# Patient Record
Sex: MALE | Race: WHITE | NOT HISPANIC OR LATINO | Employment: OTHER | URBAN - NONMETROPOLITAN AREA
[De-identification: names, ages, dates, MRNs, and addresses within clinical notes are randomized per-mention and may not be internally consistent; named-entity substitution may affect disease eponyms.]

---

## 2017-02-01 RX ORDER — CLONIDINE HYDROCHLORIDE 0.2 MG/1
TABLET ORAL
Qty: 90 TABLET | Refills: 1 | Status: SHIPPED | OUTPATIENT
Start: 2017-02-01 | End: 2017-02-22 | Stop reason: SDUPTHER

## 2017-02-10 ENCOUNTER — DOCUMENTATION (OUTPATIENT)
Dept: CARDIOLOGY | Facility: CLINIC | Age: 52
End: 2017-02-10

## 2017-02-10 DIAGNOSIS — I10 ESSENTIAL HYPERTENSION: Primary | ICD-10-CM

## 2017-02-10 RX ORDER — LOSARTAN POTASSIUM 100 MG/1
50 TABLET ORAL 2 TIMES DAILY
Qty: 30 TABLET | Refills: 0 | Status: SHIPPED | OUTPATIENT
Start: 2017-02-10 | End: 2017-02-22 | Stop reason: SDUPTHER

## 2017-02-22 ENCOUNTER — OFFICE VISIT (OUTPATIENT)
Dept: CARDIOLOGY | Facility: CLINIC | Age: 52
End: 2017-02-22

## 2017-02-22 VITALS
HEART RATE: 78 BPM | DIASTOLIC BLOOD PRESSURE: 85 MMHG | HEIGHT: 73 IN | RESPIRATION RATE: 16 BRPM | BODY MASS INDEX: 33.08 KG/M2 | SYSTOLIC BLOOD PRESSURE: 132 MMHG | WEIGHT: 249.6 LBS

## 2017-02-22 DIAGNOSIS — E66.9 OBESITY (BMI 30-39.9): ICD-10-CM

## 2017-02-22 DIAGNOSIS — I25.10 CORONARY ARTERY DISEASE INVOLVING NATIVE CORONARY ARTERY OF NATIVE HEART WITHOUT ANGINA PECTORIS: Primary | ICD-10-CM

## 2017-02-22 DIAGNOSIS — Z72.0 TOBACCO ABUSE: ICD-10-CM

## 2017-02-22 DIAGNOSIS — I10 ESSENTIAL HYPERTENSION: ICD-10-CM

## 2017-02-22 DIAGNOSIS — E78.5 DYSLIPIDEMIA: ICD-10-CM

## 2017-02-22 PROCEDURE — 99213 OFFICE O/P EST LOW 20 MIN: CPT | Performed by: INTERNAL MEDICINE

## 2017-02-22 RX ORDER — CLONIDINE HYDROCHLORIDE 0.2 MG/1
0.2 TABLET ORAL 2 TIMES DAILY
Qty: 60 TABLET | Refills: 3 | Status: SHIPPED | OUTPATIENT
Start: 2017-02-22 | End: 2017-08-15 | Stop reason: SDUPTHER

## 2017-02-22 RX ORDER — CLONIDINE HYDROCHLORIDE 0.2 MG/1
TABLET ORAL
Qty: 90 TABLET | Refills: 1 | Status: CANCELLED | OUTPATIENT
Start: 2017-02-22

## 2017-02-22 RX ORDER — LOSARTAN POTASSIUM 100 MG/1
100 TABLET ORAL DAILY
Qty: 30 TABLET | Refills: 2 | Status: SHIPPED | OUTPATIENT
Start: 2017-02-22 | End: 2017-07-11 | Stop reason: SDUPTHER

## 2017-02-22 RX ORDER — LOSARTAN POTASSIUM 100 MG/1
50 TABLET ORAL 2 TIMES DAILY
Qty: 30 TABLET | Refills: 0 | Status: CANCELLED | OUTPATIENT
Start: 2017-02-22

## 2017-02-22 NOTE — PROGRESS NOTES
Carlos Carson  1965 02/22/2017   Ref:No referring provider defined for this encounter.  Pcp: Marta Davis, [place]  [address]  [facility] [address]    Follow up for:  Chief Complaint   Patient presents with   • Hypertension     Bp equiptment at home is broken.   • Coronary Artery Disease   • PVD   • Dyslipidemia        Patient Active Problem List   Diagnosis   • Essential hypertension   • Coronary artery disease without angina pectoris, non-hemodynamically significant   • PVD (peripheral vascular disease)   • Dyslipidemia   • Tobacco abuse   • Simple chronic bronchitis   • Obesity (BMI 30-39.9)   • Cough due to ACE inhibitor (Lisinopril)   • Chronic bronchitis       HPI     Carlos Carson is a 52 yo male who presents to our office today for a follow-up of hypertension. Patient denies chest pain, shortness of breath, edema, palpitations, dizziness, and no syncope. He does note some dry mouth but tolerates it .    Review of Systems   Constitution: Negative for chills and fever.   HENT: Negative for headaches, nosebleeds and sore throat.    Respiratory: Negative for cough, hemoptysis and wheezing.    Gastrointestinal: Negative for abdominal pain, hematemesis, hematochezia, melena, nausea and vomiting.   Genitourinary: Negative for dysuria and hematuria.         Current Outpatient Prescriptions:   •  amLODIPine (NORVASC) 10 MG tablet, Take 1 tablet by mouth Every Morning., Disp: 30 tablet, Rfl: 3  •  CloNIDine (CATAPRES) 0.2 MG tablet, Take 1 tablet by mouth 2 (Two) Times a Day., Disp: 60 tablet, Rfl: 3  •  gabapentin (NEURONTIN) 800 MG tablet, Take 800 mg by mouth 4 (Four) Times a Day., Disp: , Rfl: 0  •  HYDROcodone-acetaminophen (NORCO) 7.5-325 MG per tablet, Take 7.5 tablets by mouth 2 (Two) Times a Day., Disp: , Rfl: 0  •  ibuprofen (ADVIL,MOTRIN) 800 MG tablet, Take 800 mg by mouth 3 (Three) Times a Day., Disp: , Rfl: 2  •  losartan (COZAAR) 100 MG tablet, Take 1 tablet by mouth Daily.,  "Disp: 30 tablet, Rfl: 2    Allergies   Allergen Reactions   • Lisinopril Cough       Visit Vitals   • /85 (BP Location: Right arm, Patient Position: Sitting, Cuff Size: Adult)   • Pulse 78   • Resp 16   • Ht 73\" (185.4 cm)   • Wt 249 lb 9.6 oz (113 kg)   • BMI 32.93 kg/m2          Physical Exam   Constitutional: He is oriented to person, place, and time. He appears well-developed and well-nourished. No distress.   Moderate obese   Neck: No JVD present. No tracheal deviation present.   Cardiovascular: Normal rate, regular rhythm, normal heart sounds and intact distal pulses.  Exam reveals no gallop and no friction rub.    No murmur heard.  Pulses:       Carotid pulses are 2+ on the right side, and 2+ on the left side.       Dorsalis pedis pulses are 0 on the right side, and 0 on the left side.        Posterior tibial pulses are 0 on the right side, and 0 on the left side.   Pulmonary/Chest: Breath sounds normal. No respiratory distress. He has no wheezes. He has no rales. He exhibits no tenderness.   Abdominal: Soft. Bowel sounds are normal. He exhibits no distension and no mass. There is no tenderness. There is no rebound and no guarding.   Moderately obese    Musculoskeletal: He exhibits no edema or tenderness.   Lymphadenopathy:     He has no cervical adenopathy.   Neurological: He is alert and oriented to person, place, and time. He has normal reflexes. No cranial nerve deficit. He exhibits normal muscle tone. Coordination normal.   Skin: Skin is warm and dry. No rash noted. He is not diaphoretic. No erythema. No pallor.   Psychiatric: He has a normal mood and affect. His behavior is normal. Judgment and thought content normal.   :    Lab Review:    Procedures    No results found for: NA, K, CL, CO2, BUN, CREATININE, LABGLOM, GLUCOSE, CALCIUM, AST, ALT, ALKPHOS, LABIL2  No results found for: CKTOTAL  No results found for: WBC, HGB, HCT, PLT  No results found for: INR  No results found for: MG  No results " found for: TSH, PSA, CHLPL, TRIG, HDL, LDL   No results found for: BNP    Chemistry    No results found for: NA, K, CL, CO2, BUN, CREATININE, GLU No results found for: CALCIUM, ALKPHOS, AST, ALT, BILITOT         Impression:   Diagnosis Plan   1. Coronary artery disease involving native coronary artery of native heart without angina pectoris     2. Essential hypertension, better control     3. Dyslipidemia     4. Tobacco abuse     5. Obesity (BMI 30-39.9)         Plan:    1. Continue same medication.   2. Check home BP and record.  No orders of the defined types were placed in this encounter.        Return in about 6 weeks (around 4/5/2017) for Recheck, Or sooner if needed.     This document signed by Dez Pang MD February 22, 2017 1:22 PM

## 2017-02-22 NOTE — PROGRESS NOTES
Carlos Carson  1965 12/23/2016   Pcp: Marta Davis, [place]  [address]  [id number]    Follow up for:  Chief Complaint   Patient presents with   • Hypertension     Bp equiptment at home is broken.   • Coronary Artery Disease   • PVD   • Dyslipidemia        Patient Active Problem List   Diagnosis   • Essential hypertension   • Coronary artery disease without angina pectoris, non-hemodynamically significant   • PVD (peripheral vascular disease)   • Dyslipidemia   • Tobacco abuse   • Simple chronic bronchitis   • Obesity (BMI 30-39.9)   • Cough due to ACE inhibitor (Lisinopril)   • Chronic bronchitis       HPI   Mr. Carson is a 52 yo male who has a history of CAD, hypertension, dyslipidemia and tobacco abuse.  He notes increased fatigue of uncertain etiology for the last 3 weeks..  Blood pressure log from home was brought to office and the readings at home tend to run higher than reading today in office (135-165/)..  He denies chest pain/pressure, palpitations, dizziness or syncope episodes.  He notes leg cramps (calves) with walking after 10 minutes.     Sodium intake has decreased. His caffeine intake is stated to be two pots (12 cups) per day of coffee.  He still smokes 1.5 ppd.      Review of Systems   Constitution: Negative for fever.   HENT: Positive for headaches.    Cardiovascular: Negative for chest pain, leg swelling, near-syncope, palpitations and syncope.   Respiratory: Positive for cough. Negative for shortness of breath.    Gastrointestinal: Negative for abdominal pain.   Neurological: Negative for dizziness and light-headedness.         Current Outpatient Prescriptions:   •  amLODIPine (NORVASC) 10 MG tablet, Take 1 tablet by mouth Every Morning., Disp: 30 tablet, Rfl: 3  •  CloNIDine (CATAPRES) 0.2 MG tablet, TAKE ONE TABLET BY MOUTH THREE TIMES A DAY FOR BLOOD PRESSURE, Disp: 90 tablet, Rfl: 1  •  gabapentin (NEURONTIN) 800 MG tablet, Take 800 mg by mouth 4 (Four)  "Times a Day., Disp: , Rfl: 0  •  HYDROcodone-acetaminophen (NORCO) 7.5-325 MG per tablet, Take 7.5 tablets by mouth 2 (Two) Times a Day., Disp: , Rfl: 0  •  ibuprofen (ADVIL,MOTRIN) 800 MG tablet, Take 800 mg by mouth 3 (Three) Times a Day., Disp: , Rfl: 2  •  losartan (COZAAR) 100 MG tablet, Take 0.5 tablets by mouth 2 (Two) Times a Day., Disp: 30 tablet, Rfl: 0    Allergies   Allergen Reactions   • Lisinopril Cough       Visit Vitals   • /85 (BP Location: Right arm, Patient Position: Sitting, Cuff Size: Adult)   • Pulse 78   • Resp 16   • Ht 73\" (185.4 cm)   • Wt 249 lb 9.6 oz (113 kg)   • BMI 32.93 kg/m2          Physical Exam   Constitutional: He is oriented to person, place, and time. He appears well-developed and well-nourished. No distress.   Neck: No JVD present. No tracheal deviation present.   Cardiovascular: Normal rate, regular rhythm and normal heart sounds.  Exam reveals no gallop, no S3, no S4 and no friction rub.    No murmur heard.  Pulses:       Carotid pulses are 2+ on the right side, and 2+ on the left side.       Dorsalis pedis pulses are 0 on the right side, and 0 on the left side.        Posterior tibial pulses are 0 on the right side, and 0 on the left side.   Pulmonary/Chest: Effort normal. No respiratory distress. He has decreased breath sounds. He has no wheezes. He has no rales. He exhibits no tenderness.   Abdominal: Soft. Bowel sounds are normal. He exhibits no distension and no mass. There is no tenderness.   Musculoskeletal: He exhibits no edema.   Neurological: He is alert and oriented to person, place, and time. No cranial nerve deficit.   Skin: Skin is warm and dry. He is not diaphoretic.   :      Procedures      Impression:  No diagnosis found.    Plan:    1.  Decrease Clonidine to .5 tablet of  0.2 mg, po qam. Continue to take 0.2 mg po qpm.  2.  Increase Amlodipine to 10 mg po qam.  Rx sent to pharmacy.   3.  Encouraged decreasing caffeine intake.  4.  Bring home BP cuff " to next visit to compare with our office equipment.  5.  Encouraged smoking cessation.  6.  Later stress test in form of Dobutrex stress echo and JOHN PAUL      No Follow-up on file.     This document signed by Giuliana Nugent CMA February 22, 2017 11:17 AM       Scribed for Dez Pang MD by Giuliana Nugent CMA 2/22/2017  11:17 AM

## 2017-04-03 ENCOUNTER — OFFICE VISIT (OUTPATIENT)
Dept: CARDIOLOGY | Facility: CLINIC | Age: 52
End: 2017-04-03

## 2017-04-03 VITALS
SYSTOLIC BLOOD PRESSURE: 131 MMHG | BODY MASS INDEX: 33.27 KG/M2 | DIASTOLIC BLOOD PRESSURE: 92 MMHG | RESPIRATION RATE: 16 BRPM | WEIGHT: 251 LBS | HEART RATE: 81 BPM | HEIGHT: 73 IN

## 2017-04-03 DIAGNOSIS — Z72.0 TOBACCO ABUSE: ICD-10-CM

## 2017-04-03 DIAGNOSIS — R05.8 COUGH DUE TO ACE INHIBITOR: ICD-10-CM

## 2017-04-03 DIAGNOSIS — E78.5 DYSLIPIDEMIA: ICD-10-CM

## 2017-04-03 DIAGNOSIS — I10 ESSENTIAL HYPERTENSION: ICD-10-CM

## 2017-04-03 DIAGNOSIS — I25.10 CORONARY ARTERY DISEASE INVOLVING NATIVE CORONARY ARTERY OF NATIVE HEART WITHOUT ANGINA PECTORIS: Primary | ICD-10-CM

## 2017-04-03 DIAGNOSIS — T46.4X5A COUGH DUE TO ACE INHIBITOR: ICD-10-CM

## 2017-04-03 DIAGNOSIS — E66.9 OBESITY (BMI 30-39.9): ICD-10-CM

## 2017-04-03 PROCEDURE — 99213 OFFICE O/P EST LOW 20 MIN: CPT | Performed by: INTERNAL MEDICINE

## 2017-04-03 NOTE — PROGRESS NOTES
Carlos Carson  1965 04/03/2017   Ref:No referring provider defined for this encounter.  [facility] Davis, [place]  [address]  [facility] [address]    Follow up for:  Chief Complaint   Patient presents with   • Coronary Artery Disease     Follow up   • Hypertension        Patient Active Problem List   Diagnosis   • Essential hypertension   • Coronary artery disease without angina pectoris, non-hemodynamically significant   • PVD (peripheral vascular disease)   • Dyslipidemia   • Tobacco abuse   • Simple chronic bronchitis   • Obesity (BMI 30-39.9)   • Cough due to ACE inhibitor (Lisinopril)   • Chronic bronchitis       HPI     Carlos Carson is a 50 yo male who presents to our office today for a follow-up of  hypertension. Patient notes blood pressure at home is running 120's-130's, but over the last week it has been running in higher in the 150's/100. He has been using a new spice. Patient denies chest pain, shortness of breath, edema palpitations, dizziness, and no syncope. Patient notes smokes 1 1/2-2 ppd.     Review of Systems   Constitution: Positive for malaise/fatigue. Negative for chills and fever.   HENT: Negative for ear pain, headaches and nosebleeds.    Cardiovascular: Positive for dyspnea on exertion. Negative for irregular heartbeat, leg swelling and palpitations.   Respiratory: Positive for wheezing. Negative for cough and shortness of breath.    Hematologic/Lymphatic: Does not bruise/bleed easily.   Skin: Negative for rash.   Gastrointestinal: Negative for abdominal pain, constipation and diarrhea.   Neurological: Positive for light-headedness. Negative for dizziness.   Psychiatric/Behavioral: Negative for depression. The patient is not nervous/anxious.          Current Outpatient Prescriptions:   •  amLODIPine (NORVASC) 10 MG tablet, Take 1 tablet by mouth Every Morning., Disp: 30 tablet, Rfl: 3  •  CloNIDine (CATAPRES) 0.2 MG tablet, Take 1 tablet by mouth 2 (Two) Times a  "Day., Disp: 60 tablet, Rfl: 3  •  gabapentin (NEURONTIN) 800 MG tablet, Take 800 mg by mouth 4 (Four) Times a Day., Disp: , Rfl: 0  •  HYDROcodone-acetaminophen (NORCO) 7.5-325 MG per tablet, Take 7.5 tablets by mouth 2 (Two) Times a Day., Disp: , Rfl: 0  •  ibuprofen (ADVIL,MOTRIN) 800 MG tablet, Take 800 mg by mouth 3 (Three) Times a Day., Disp: , Rfl: 2  •  losartan (COZAAR) 100 MG tablet, Take 1 tablet by mouth Daily., Disp: 30 tablet, Rfl: 2    Allergies   Allergen Reactions   • Lisinopril Cough       /92 (BP Location: Right arm, Patient Position: Sitting, Cuff Size: Adult)  Pulse 81  Resp 16  Ht 73\" (185.4 cm)  Wt 251 lb (114 kg)  BMI 33.12 kg/m2       Physical Exam   Constitutional: He is oriented to person, place, and time. He appears well-developed and well-nourished. No distress.   Moderately obese   Neck: No JVD present. No tracheal deviation present.   Cardiovascular: Normal rate, regular rhythm, normal heart sounds and intact distal pulses.  Exam reveals no gallop and no friction rub.    No murmur heard.  Pulses:       Carotid pulses are 2+ on the right side, and 2+ on the left side.       Dorsalis pedis pulses are 2+ on the right side, and 2+ on the left side.        Posterior tibial pulses are 2+ on the right side, and 2+ on the left side.   Pulmonary/Chest: Effort normal and breath sounds normal. No respiratory distress. He has no wheezes. He has no rales. He exhibits no tenderness.   Abdominal: Soft. Bowel sounds are normal. He exhibits no distension and no mass. There is no tenderness. There is no rebound and no guarding.   Moderately obese   Musculoskeletal: He exhibits no edema or tenderness.   Neurological: He is alert and oriented to person, place, and time. He has normal reflexes. He displays normal reflexes. No cranial nerve deficit. He exhibits normal muscle tone. Coordination normal.   Skin: Skin is warm and dry. No rash noted. He is not diaphoretic. No erythema. No pallor. "   Psychiatric: He has a normal mood and affect. His behavior is normal. Judgment and thought content normal.   :    Lab Review:    Procedures    No results found for: NA, K, CL, CO2, BUN, CREATININE, LABGLOM, GLUCOSE, CALCIUM, AST, ALT, ALKPHOS, LABIL2  No results found for: CKTOTAL  No results found for: WBC, HGB, HCT, PLT  No results found for: INR  No results found for: MG  No results found for: TSH, PSA, CHLPL, TRIG, HDL, LDL   No results found for: BNP    Chemistry    No results found for: NA, K, CL, CO2, BUN, CREATININE, GLU No results found for: CALCIUM, ALKPHOS, AST, ALT, BILITOT         Impression:   Diagnosis Plan   1. Coronary artery disease involving native coronary artery of native heart without angina pectoris     2. Essential hypertension     3. Cough due to ACE inhibitor (Lisinopril)     4. Dyslipidemia     5. Obesity (BMI 30-39.9)     6. Tobacco abuse         Plan:  No orders of the defined types were placed in this encounter.  1. Request labs from PCP  2. Continue to check BP at home and keep a log      Return in about 6 months (around 10/3/2017) for Recheck, Or sooner if needed.     This document signed by Nini Aviles MA April 3, 2017 8:57 AM     Scribed for Dez Pang MD by Nini Aviles CMA. 4/3/2017  8:57 AM     I, Dez Pang MD, personally performed the services described in this documentation as scribed by the above named individual in my presence, and it is both accurate and complete.  4/3/2017  10:54 AM

## 2017-04-04 DIAGNOSIS — Z79.899 DRUG THERAPY: Primary | ICD-10-CM

## 2017-04-04 RX ORDER — SIMVASTATIN 20 MG
20 TABLET ORAL NIGHTLY
Qty: 30 TABLET | Refills: 3 | Status: SHIPPED | OUTPATIENT
Start: 2017-04-04 | End: 2017-08-15 | Stop reason: SDUPTHER

## 2017-04-14 ENCOUNTER — TELEPHONE (OUTPATIENT)
Dept: CARDIOLOGY | Facility: CLINIC | Age: 52
End: 2017-04-14

## 2017-05-12 ENCOUNTER — TELEPHONE (OUTPATIENT)
Dept: CARDIOLOGY | Facility: CLINIC | Age: 52
End: 2017-05-12

## 2017-05-12 RX ORDER — NICOTINE 21 MG/24HR
1 PATCH, TRANSDERMAL 24 HOURS TRANSDERMAL EVERY 24 HOURS
Qty: 42 PATCH | Refills: 0 | Status: SHIPPED | OUTPATIENT
Start: 2017-05-12 | End: 2018-02-26 | Stop reason: ALTCHOICE

## 2017-05-12 RX ORDER — NICOTINE 21 MG/24HR
1 PATCH, TRANSDERMAL 24 HOURS TRANSDERMAL EVERY 24 HOURS
Qty: 14 PATCH | Refills: 0 | Status: SHIPPED | OUTPATIENT
Start: 2017-05-12 | End: 2018-02-26 | Stop reason: ALTCHOICE

## 2017-05-18 DIAGNOSIS — I10 ESSENTIAL HYPERTENSION: ICD-10-CM

## 2017-05-18 RX ORDER — AMLODIPINE BESYLATE 10 MG/1
10 TABLET ORAL EVERY MORNING
Qty: 30 TABLET | Refills: 3 | Status: SHIPPED | OUTPATIENT
Start: 2017-05-18 | End: 2017-08-15 | Stop reason: SDUPTHER

## 2017-07-11 RX ORDER — LOSARTAN POTASSIUM 100 MG/1
100 TABLET ORAL DAILY
Qty: 30 TABLET | Refills: 2 | Status: SHIPPED | OUTPATIENT
Start: 2017-07-11 | End: 2017-08-15 | Stop reason: SDUPTHER

## 2017-08-15 ENCOUNTER — OFFICE VISIT (OUTPATIENT)
Dept: CARDIOLOGY | Facility: CLINIC | Age: 52
End: 2017-08-15

## 2017-08-15 VITALS
WEIGHT: 260 LBS | HEIGHT: 73 IN | DIASTOLIC BLOOD PRESSURE: 86 MMHG | HEART RATE: 91 BPM | SYSTOLIC BLOOD PRESSURE: 140 MMHG | BODY MASS INDEX: 34.46 KG/M2 | RESPIRATION RATE: 16 BRPM

## 2017-08-15 DIAGNOSIS — E78.5 DYSLIPIDEMIA: ICD-10-CM

## 2017-08-15 DIAGNOSIS — I25.10 CORONARY ARTERY DISEASE INVOLVING NATIVE CORONARY ARTERY OF NATIVE HEART WITHOUT ANGINA PECTORIS: Primary | ICD-10-CM

## 2017-08-15 DIAGNOSIS — J41.0 SIMPLE CHRONIC BRONCHITIS (HCC): ICD-10-CM

## 2017-08-15 DIAGNOSIS — E66.9 OBESITY (BMI 30-39.9): ICD-10-CM

## 2017-08-15 DIAGNOSIS — I10 ESSENTIAL HYPERTENSION: ICD-10-CM

## 2017-08-15 DIAGNOSIS — Z72.0 TOBACCO ABUSE: ICD-10-CM

## 2017-08-15 DIAGNOSIS — I73.9 PVD (PERIPHERAL VASCULAR DISEASE) (HCC): ICD-10-CM

## 2017-08-15 PROCEDURE — 99214 OFFICE O/P EST MOD 30 MIN: CPT | Performed by: INTERNAL MEDICINE

## 2017-08-15 PROCEDURE — 93000 ELECTROCARDIOGRAM COMPLETE: CPT | Performed by: INTERNAL MEDICINE

## 2017-08-15 RX ORDER — CLONIDINE HYDROCHLORIDE 0.2 MG/1
0.2 TABLET ORAL 2 TIMES DAILY
Qty: 180 TABLET | Refills: 1 | Status: SHIPPED | OUTPATIENT
Start: 2017-08-15 | End: 2017-11-27 | Stop reason: SDUPTHER

## 2017-08-15 RX ORDER — AMLODIPINE BESYLATE 10 MG/1
10 TABLET ORAL EVERY MORNING
Qty: 90 TABLET | Refills: 1 | Status: SHIPPED | OUTPATIENT
Start: 2017-08-15 | End: 2017-08-24 | Stop reason: SDUPTHER

## 2017-08-15 RX ORDER — SIMVASTATIN 20 MG
20 TABLET ORAL NIGHTLY
Qty: 90 TABLET | Refills: 1 | Status: SHIPPED | OUTPATIENT
Start: 2017-08-15 | End: 2017-12-27 | Stop reason: SDUPTHER

## 2017-08-15 RX ORDER — LOSARTAN POTASSIUM 100 MG/1
100 TABLET ORAL DAILY
Qty: 90 TABLET | Refills: 1 | Status: SHIPPED | OUTPATIENT
Start: 2017-08-15 | End: 2017-11-27 | Stop reason: SDUPTHER

## 2017-08-15 NOTE — PROGRESS NOTES
Carlos Carson  1965  08/15/2017   Ref:No referring provider defined for this encounter.  Pcp: Marta Davis, APRN  [address]  [place] [address]    Follow up for:  Chief Complaint   Patient presents with   • Follow-up   • Meds     List        Patient Active Problem List   Diagnosis   • Essential hypertension   • Coronary artery disease without angina pectoris, non-hemodynamically significant   • PVD (peripheral vascular disease)   • Dyslipidemia   • Tobacco abuse   • Simple chronic bronchitis   • Obesity (BMI 30-39.9)   • Cough due to ACE inhibitor (Lisinopril)   • Chronic bronchitis       HPI     Carlos Carson is a 51 yo male who presents to our office today for a follow-up visit of hypertension. Average blood pressure in July and August 125/85. Only about 20-25% of Bps were mildly elevated in the 140-150 range. Patient notes occasional dizziness that lasts 5-30 minutes and happens 2-3 times a week. Patient denies chest pain, shortness of breath, edema, palpitations and syncope. Tobacco use 1-1/2 to 2 ppd.     Review of Systems   Constitution: Negative for chills and fever.   HENT: Negative for headaches, nosebleeds and sore throat.    Cardiovascular: Negative for chest pain, leg swelling, palpitations and syncope.   Respiratory: Negative for cough, hemoptysis, shortness of breath and wheezing.    Gastrointestinal: Negative for abdominal pain, hematemesis, hematochezia, melena, nausea and vomiting.   Genitourinary: Negative for dysuria and hematuria.   Neurological: Positive for dizziness.         Current Outpatient Prescriptions:   •  amLODIPine (NORVASC) 10 MG tablet, Take 1 tablet by mouth Every Morning., Disp: 90 tablet, Rfl: 1  •  CloNIDine (CATAPRES) 0.2 MG tablet, Take 1 tablet by mouth 2 (Two) Times a Day., Disp: 180 tablet, Rfl: 1  •  gabapentin (NEURONTIN) 800 MG tablet, Take 800 mg by mouth 4 (Four) Times a Day., Disp: , Rfl: 0  •  HYDROcodone-acetaminophen (NORCO) 7.5-325 MG  "per tablet, Take 7.5 tablets by mouth 2 (Two) Times a Day., Disp: , Rfl: 0  •  ibuprofen (ADVIL,MOTRIN) 800 MG tablet, Take 800 mg by mouth 3 (Three) Times a Day., Disp: , Rfl: 2  •  losartan (COZAAR) 100 MG tablet, Take 1 tablet by mouth Daily., Disp: 90 tablet, Rfl: 1  •  nicotine (NICODERM CQ) 14 MG/24HR patch, Place 1 patch on the skin Daily. After finished the 21mg patches. Apply 14mg patch QD X2 weeks., Disp: 14 patch, Rfl: 0  •  nicotine (NICODERM CQ) 21 MG/24HR patch, Place 1 patch on the skin Daily. 21mg  QD X 6 weeks., Disp: 42 patch, Rfl: 0  •  nicotine (NICODERM CQ) 7 MG/24HR patch, Place 1 patch on the skin Daily. After finished the 14mg patches. Apply 7mg patch qd x 2 weeks., Disp: 14 patch, Rfl: 0  •  simvastatin (ZOCOR) 20 MG tablet, Take 1 tablet by mouth Every Night., Disp: 90 tablet, Rfl: 1    Allergies   Allergen Reactions   • Lisinopril Cough       /86 (BP Location: Right arm)  Pulse 91  Resp 16  Ht 73\" (185.4 cm)  Wt 260 lb (118 kg)  BMI 34.3 kg/m2       Physical Exam   Constitutional: He is oriented to person, place, and time. He appears well-developed and well-nourished. No distress.   Moderately obese   Eyes: No scleral icterus.   Neck: No JVD present. No tracheal deviation present. No thyromegaly present.   Cardiovascular: Normal rate, regular rhythm and intact distal pulses.  Exam reveals no gallop and no friction rub.    No murmur heard.  Pulses:       Carotid pulses are 2+ on the right side, and 2+ on the left side.       Dorsalis pedis pulses are 0 on the right side, and 0 on the left side.        Posterior tibial pulses are 0 on the right side, and 0 on the left side.   Markedly distant heart sounds   Pulmonary/Chest: Effort normal. No stridor. No respiratory distress. He has no wheezes. He has no rales. He exhibits no tenderness.   Markedly decreased breath sounds   Abdominal: Soft. Bowel sounds are normal. He exhibits no distension and no mass. There is no tenderness. " There is no rebound and no guarding.   Moderately obese   Musculoskeletal: Normal range of motion. He exhibits edema (1+ edema). He exhibits no tenderness (trace ankle edema bilaterally) or deformity.   Lymphadenopathy:     He has no cervical adenopathy.   Neurological: He is alert and oriented to person, place, and time. No cranial nerve deficit. He exhibits normal muscle tone. Coordination normal.   Skin: No rash noted. He is not diaphoretic. No erythema. No pallor.   Psychiatric: He has a normal mood and affect. His behavior is normal. Judgment and thought content normal.   :    Lab Review:      ECG 12 Lead  Date/Time: 8/15/2017 8:05 AM  Performed by: ZENA RAI  Authorized by: ZENA RAI   Comparison: compared with previous ECG from 11/9/2016  Similar to previous ECG  Rhythm: sinus rhythm  Rate: normal  BPM: 83  QRS axis: normal  Other findings: early repolarization  Clinical impression: abnormal ECG  Comments: QTc: 38   Short WI interval =.11            No results found for: NA, K, CL, CO2, BUN, CREATININE, LABGLOM, GLUCOSE, CALCIUM, AST, ALT, ALKPHOS, LABIL2  No results found for: CKTOTAL  No results found for: WBC, HGB, HCT, PLT  No results found for: INR  No results found for: MG  No results found for: TSH, PSA, CHLPL, TRIG, HDL, LDL   No results found for: BNP    Chemistry    No results found for: NA, K, CL, CO2, BUN, CREATININE, GLU No results found for: CALCIUM, ALKPHOS, AST, ALT, BILITOT         Impression:   Diagnosis Plan   1. Coronary artery disease involving native coronary artery of native heart without angina pectoris  ECG 12 Lead   2. Essential hypertension, uncontrolled  amLODIPine (NORVASC) 10 MG tablet   3. PVD (peripheral vascular disease)     4. Dyslipidemia     5. Tobacco abuse     6. Simple chronic bronchitis     7. Obesity (BMI 30-39.9)         Plan: Continue amlodipine, losartan clonidine. Can use extra dose of clonidine if BP high.  Weight loss.   D/C smoking  discussed.  Gets labs from [facility].    Return in about 6 months (around 2/15/2018) for recheck.     This document signed by Dez Pang MD August 15, 2017 10:37 AM     I, Dez Pang MD, personally performed the services described in this documentation as scribed by the above named individual in my presence, and it is both accurate and complete.  8/15/2017  10:37 AM    Scribed for Dez Pang MD by Nini Aviles CMA. 8/15/2017  10:37 AM

## 2017-08-16 ENCOUNTER — TELEPHONE (OUTPATIENT)
Dept: CARDIOLOGY | Facility: CLINIC | Age: 52
End: 2017-08-16

## 2017-08-16 NOTE — TELEPHONE ENCOUNTER
----- Message from Dez Pang MD sent at 8/15/2017  8:48 AM EDT -----  Get labs from 3 months [facility].     Called Severiano VASQUEZ and requested his labs.

## 2017-08-24 DIAGNOSIS — I10 ESSENTIAL HYPERTENSION: ICD-10-CM

## 2017-08-24 RX ORDER — AMLODIPINE BESYLATE 10 MG/1
10 TABLET ORAL EVERY MORNING
Qty: 90 TABLET | Refills: 1 | Status: SHIPPED | OUTPATIENT
Start: 2017-08-24 | End: 2017-09-26 | Stop reason: SDUPTHER

## 2017-09-26 ENCOUNTER — TELEPHONE (OUTPATIENT)
Dept: CARDIOLOGY | Facility: CLINIC | Age: 52
End: 2017-09-26

## 2017-09-26 DIAGNOSIS — I10 ESSENTIAL HYPERTENSION: ICD-10-CM

## 2017-09-26 RX ORDER — AMLODIPINE BESYLATE 10 MG/1
10 TABLET ORAL EVERY MORNING
Qty: 90 TABLET | Refills: 2 | Status: SHIPPED | OUTPATIENT
Start: 2017-09-26 | End: 2018-12-07 | Stop reason: SDUPTHER

## 2017-09-26 NOTE — TELEPHONE ENCOUNTER
Patient called needing a refill on his amlodipine 10mg. Can he get enough refills until he comes back in in Feb?   [facility] pharmacy.

## 2017-11-27 RX ORDER — CLONIDINE HYDROCHLORIDE 0.2 MG/1
0.2 TABLET ORAL 2 TIMES DAILY
Qty: 180 TABLET | Refills: 1 | Status: SHIPPED | OUTPATIENT
Start: 2017-11-27 | End: 2018-09-21 | Stop reason: ALTCHOICE

## 2017-11-27 RX ORDER — LOSARTAN POTASSIUM 100 MG/1
100 TABLET ORAL DAILY
Qty: 90 TABLET | Refills: 1 | Status: SHIPPED | OUTPATIENT
Start: 2017-11-27 | End: 2018-02-26 | Stop reason: SDUPTHER

## 2017-11-27 RX ORDER — LOSARTAN POTASSIUM 100 MG/1
100 TABLET ORAL DAILY
Qty: 90 TABLET | Refills: 1 | Status: CANCELLED | OUTPATIENT
Start: 2017-11-27

## 2017-12-27 RX ORDER — SIMVASTATIN 20 MG
TABLET ORAL
Qty: 90 TABLET | Refills: 1 | Status: SHIPPED | OUTPATIENT
Start: 2017-12-27 | End: 2018-02-02

## 2018-02-02 DIAGNOSIS — I10 ESSENTIAL HYPERTENSION: ICD-10-CM

## 2018-02-02 DIAGNOSIS — E78.5 HYPERLIPIDEMIA, UNSPECIFIED HYPERLIPIDEMIA TYPE: ICD-10-CM

## 2018-02-02 DIAGNOSIS — E78.1 HIGH TRIGLYCERIDES: ICD-10-CM

## 2018-02-02 DIAGNOSIS — R79.9 ABNORMAL FINDING OF BLOOD CHEMISTRY: ICD-10-CM

## 2018-02-02 DIAGNOSIS — R89.9 ABNORMAL LABORATORY TEST: Primary | ICD-10-CM

## 2018-02-02 RX ORDER — OMEGA-3-ACID ETHYL ESTERS 1 G/1
1 CAPSULE, LIQUID FILLED ORAL
Qty: 60 CAPSULE | Refills: 3 | Status: SHIPPED | OUTPATIENT
Start: 2018-02-02 | End: 2018-02-26 | Stop reason: ALTCHOICE

## 2018-02-02 RX ORDER — FENOFIBRATE 48 MG/1
48 TABLET, COATED ORAL DAILY
Qty: 30 TABLET | Refills: 3 | Status: SHIPPED | OUTPATIENT
Start: 2018-02-02 | End: 2018-08-13 | Stop reason: SDUPTHER

## 2018-02-02 RX ORDER — ATORVASTATIN CALCIUM 40 MG/1
40 TABLET, FILM COATED ORAL DAILY
Qty: 30 TABLET | Refills: 3 | Status: SHIPPED | OUTPATIENT
Start: 2018-02-02 | End: 2018-09-21 | Stop reason: ALTCHOICE

## 2018-02-26 ENCOUNTER — OFFICE VISIT (OUTPATIENT)
Dept: CARDIOLOGY | Facility: CLINIC | Age: 53
End: 2018-02-26

## 2018-02-26 VITALS
WEIGHT: 250.2 LBS | HEART RATE: 80 BPM | DIASTOLIC BLOOD PRESSURE: 91 MMHG | BODY MASS INDEX: 33.16 KG/M2 | HEIGHT: 73 IN | SYSTOLIC BLOOD PRESSURE: 132 MMHG | OXYGEN SATURATION: 99 %

## 2018-02-26 DIAGNOSIS — Z72.0 TOBACCO ABUSE: ICD-10-CM

## 2018-02-26 DIAGNOSIS — R53.82 CHRONIC FATIGUE: ICD-10-CM

## 2018-02-26 DIAGNOSIS — I25.10 ASCVD (ARTERIOSCLEROTIC CARDIOVASCULAR DISEASE): Primary | ICD-10-CM

## 2018-02-26 DIAGNOSIS — I73.9 PERIPHERAL ARTERY DISEASE (HCC): ICD-10-CM

## 2018-02-26 DIAGNOSIS — I73.9 PERIPHERAL ARTERIAL DISEASE (HCC): ICD-10-CM

## 2018-02-26 DIAGNOSIS — E78.5 DYSLIPIDEMIA: ICD-10-CM

## 2018-02-26 DIAGNOSIS — I10 ESSENTIAL HYPERTENSION: ICD-10-CM

## 2018-02-26 PROCEDURE — 93000 ELECTROCARDIOGRAM COMPLETE: CPT | Performed by: INTERNAL MEDICINE

## 2018-02-26 PROCEDURE — 99214 OFFICE O/P EST MOD 30 MIN: CPT | Performed by: INTERNAL MEDICINE

## 2018-02-26 RX ORDER — LOSARTAN POTASSIUM 100 MG/1
100 TABLET ORAL DAILY
Qty: 90 TABLET | Refills: 1 | Status: SHIPPED | OUTPATIENT
Start: 2018-02-26 | End: 2018-09-21 | Stop reason: ALTCHOICE

## 2018-02-26 RX ORDER — CLOPIDOGREL BISULFATE 75 MG/1
75 TABLET ORAL DAILY
Qty: 30 TABLET | Refills: 11 | Status: SHIPPED | OUTPATIENT
Start: 2018-02-26 | End: 2018-09-21 | Stop reason: ALTCHOICE

## 2018-02-26 RX ORDER — AMOXICILLIN 500 MG
1200 CAPSULE ORAL DAILY
COMMUNITY
End: 2019-02-12 | Stop reason: ALTCHOICE

## 2018-02-26 NOTE — PROGRESS NOTES
Marta Davis, APRN  Carlos Carson  1965 02/26/2018    Patient Active Problem List   Diagnosis   • Essential hypertension   • Coronary artery disease without angina pectoris, non-hemodynamically significant   • PVD (peripheral vascular disease)   • Dyslipidemia   • Tobacco abuse   • Simple chronic bronchitis   • Obesity (BMI 30-39.9)   • Cough due to ACE inhibitor (Lisinopril)   • Chronic bronchitis   • ASCVD (arteriosclerotic cardiovascular disease), apparently nonobstructive, clinically stable.   • Peripheral arterial disease, involving both lower extremities with bilateral leg claudication.       Dear Marta aDvis, APRN:    Subjective     Carlos Carson is a 52 y.o. male with the problems as listed above, presents    Chief complaint: Follow-up of coronary artery disease and PAD.    History of Present Illness: Ms. Carson is a pleasant 50 yr old  male with history of nonobstructive coronary artery disease and history of hypertension, is here for regular cardiology follow-up.  He denies any complaints of chest pains.  He has chronic dyspnea with moderate exertion with no PND, orthopnea or pedal edema.  He has long history of smoking and continues to smoke about a pack to pack and half a day.  He has leg cramps with walking a few yards.He says he feels tired all the time.      Allergies   Allergen Reactions   • Lisinopril Cough   :      Current Outpatient Prescriptions:   •  amLODIPine (NORVASC) 10 MG tablet, Take 1 tablet by mouth Every Morning., Disp: 90 tablet, Rfl: 2  •  atorvastatin (LIPITOR) 40 MG tablet, Take 1 tablet by mouth Daily., Disp: 30 tablet, Rfl: 3  •  CloNIDine (CATAPRES) 0.2 MG tablet, Take 1 tablet by mouth 2 (Two) Times a Day., Disp: 180 tablet, Rfl: 1  •  fenofibrate (TRICOR) 48 MG tablet, Take 1 tablet by mouth Daily., Disp: 30 tablet, Rfl: 3  •  gabapentin (NEURONTIN) 800 MG tablet, Take 800 mg by mouth 4 (Four) Times a Day., Disp: , Rfl: 0  •   "HYDROcodone-acetaminophen (NORCO) 7.5-325 MG per tablet, Take 7.5 tablets by mouth 2 (Two) Times a Day., Disp: , Rfl: 0  •  ibuprofen (ADVIL,MOTRIN) 800 MG tablet, Take 800 mg by mouth 3 (Three) Times a Day., Disp: , Rfl: 2  •  losartan (COZAAR) 100 MG tablet, Take 1 tablet by mouth Daily., Disp: 90 tablet, Rfl: 1  •  Omega-3 Fatty Acids (FISH OIL) 1200 MG capsule capsule, Take 1,200 mg by mouth Daily., Disp: , Rfl:   •  clopidogrel (PLAVIX) 75 MG tablet, Take 1 tablet by mouth Daily., Disp: 30 tablet, Rfl: 11      The following portions of the patient's history were reviewed and updated as appropriate: allergies, current medications, past family history, past medical history, past social history, past surgical history and problem list.    Social History   Substance Use Topics   • Smoking status: Current Every Day Smoker     Packs/day: 2.00     Types: Cigarettes   • Smokeless tobacco: None   • Alcohol use 3.6 oz/week     6 Cans of beer per week       Review of Systems   Constitution: Negative for chills and fever.   HENT: Negative for nosebleeds and sore throat.    Cardiovascular: Positive for leg swelling (Some in his feet. ). Negative for chest pain, palpitations and syncope.   Respiratory: Positive for shortness of breath (No change). Negative for cough, hemoptysis and wheezing.    Gastrointestinal: Negative for abdominal pain, hematemesis, hematochezia, melena, nausea and vomiting.   Genitourinary: Negative for dysuria and hematuria.   Neurological: Negative for dizziness and headaches.       Objective   Vitals:    02/26/18 0816   BP: 132/91   BP Location: Left arm   Patient Position: Sitting   Cuff Size: Adult   Pulse: 80   SpO2: 99%   Weight: 113 kg (250 lb 3.2 oz)   Height: 185.4 cm (73\")     Body mass index is 33.01 kg/(m^2).        Physical Exam   Constitutional: He is oriented to person, place, and time. He appears well-developed and well-nourished.   HENT:   Head: Normocephalic.   Eyes: Conjunctivae and " EOM are normal.   Neck: Normal range of motion. Neck supple. No JVD present. No tracheal deviation present. No thyromegaly present.   Cardiovascular: Normal rate, regular rhythm, S1 normal and S2 normal.  Exam reveals no gallop, no S3, no S4 and no friction rub.    No murmur heard.  Pulses:       Carotid pulses are 0 on the right side, and 0 on the left side.       Dorsalis pedis pulses are 1+ on the right side, and 1+ on the left side.        Posterior tibial pulses are 1+ on the right side, and 1+ on the left side.   Pulmonary/Chest: Breath sounds normal. No respiratory distress. He has no wheezes. He has no rales.   Abdominal: Soft. Bowel sounds are normal. He exhibits no mass. There is no tenderness.   Musculoskeletal: He exhibits no edema.   Neurological: He is alert and oriented to person, place, and time. No cranial nerve deficit.   Skin: Skin is warm and dry.   Psychiatric: He has a normal mood and affect.       Echo   No results found for: ECHOEFEST    ECG 12 Lead  Date/Time: 2/26/2018 8:05 AM  Performed by: HORTENCIA HOLLIS  Authorized by: HORTENCIA HOLLIS   Rhythm: sinus rhythm  Conduction: conduction normal  ST Segments: ST segments normal  T Waves: T waves normal  Other: no other findings  Clinical impression: normal ECG            Assessment/Plan    Diagnosis Plan   1. ASCVD (arteriosclerotic cardiovascular disease), apparently nonobstructive, clinically stable.     2. Peripheral arterial disease, involving both lower extremities with bilateral leg claudication.     3. Essential hypertension     4. Tobacco abuse     5. Dyslipidemia  Lipid Panel    Comprehensive Metabolic Panel   6. Peripheral artery disease  Duplex Lower Extremity Art / Grafts - Bilateral CAR        Recommendations:    1. We'll start Plavix 75 mg daily for his peripheral artery disease and coronary artery disease.  2. Continue with amlodipine, losartan and Klonopin.  3. Continue with atorvastatin and fenofibrate.  4. Check arterial  duplex scan of both lower extremities.  5. Check fasting lipid panel and adjust the dose of statin as needed.  6. I have emphasized strongly to quit smoking and explained the risks of continued smoking to him.    Return in about 3 months (around 5/26/2018).    As always, I appreciate very much the opportunity to participate in the cardiovascular care of your patients.      With Best Regards,    Mohit Gonzales MD, [facility]    Dragon disclaimer:  Much of this encounter note is an electronic transcription/translation of spoken language to printed text. The electronic translation of spoken language may permit erroneous, or at times, nonsensical words or phrases to be inadvertently transcribed; Although I have reviewed the note for such errors, some may still exist.

## 2018-02-28 ENCOUNTER — HOSPITAL ENCOUNTER (OUTPATIENT)
Dept: CARDIOLOGY | Facility: HOSPITAL | Age: 53
Discharge: HOME | End: 2018-02-28
Attending: INTERNAL MEDICINE | Admitting: INTERNAL MEDICINE

## 2018-02-28 ENCOUNTER — LAB (OUTPATIENT)
Dept: LAB | Facility: HOSPITAL | Age: 53
End: 2018-02-28
Attending: INTERNAL MEDICINE

## 2018-02-28 DIAGNOSIS — I73.9 PERIPHERAL ARTERY DISEASE (HCC): ICD-10-CM

## 2018-02-28 DIAGNOSIS — R53.82 CHRONIC FATIGUE: ICD-10-CM

## 2018-02-28 DIAGNOSIS — E78.5 DYSLIPIDEMIA: ICD-10-CM

## 2018-02-28 LAB
ALBUMIN SERPL-MCNC: 4.8 G/DL (ref 3.5–5)
ALBUMIN/GLOB SERPL: 1.8 G/DL (ref 1.5–2.5)
ALP SERPL-CCNC: 83 U/L (ref 40–129)
ALT SERPL W P-5'-P-CCNC: 27 U/L (ref 10–44)
ANION GAP SERPL CALCULATED.3IONS-SCNC: 2.8 MMOL/L (ref 3.6–11.2)
AST SERPL-CCNC: 22 U/L (ref 10–34)
BILIRUB SERPL-MCNC: 0.3 MG/DL (ref 0.2–1.8)
BUN BLD-MCNC: 13 MG/DL (ref 7–21)
BUN/CREAT SERPL: 13.3 (ref 7–25)
CALCIUM SPEC-SCNC: 9.4 MG/DL (ref 7.7–10)
CHLORIDE SERPL-SCNC: 111 MMOL/L (ref 99–112)
CHOLEST SERPL-MCNC: 126 MG/DL (ref 0–200)
CO2 SERPL-SCNC: 27.2 MMOL/L (ref 24.3–31.9)
CREAT BLD-MCNC: 0.98 MG/DL (ref 0.43–1.29)
CRP SERPL-MCNC: <0.5 MG/DL (ref 0–0.99)
GFR SERPL CREATININE-BSD FRML MDRD: 80 ML/MIN/1.73
GLOBULIN UR ELPH-MCNC: 2.7 GM/DL
GLUCOSE BLD-MCNC: 104 MG/DL (ref 70–110)
HDLC SERPL-MCNC: 32 MG/DL (ref 60–100)
LDLC SERPL CALC-MCNC: 49 MG/DL (ref 0–100)
LDLC/HDLC SERPL: 1.54 {RATIO}
OSMOLALITY SERPL CALC.SUM OF ELEC: 281.7 MOSM/KG (ref 273–305)
POTASSIUM BLD-SCNC: 4.9 MMOL/L (ref 3.5–5.3)
PROT SERPL-MCNC: 7.5 G/DL (ref 6–8)
SODIUM BLD-SCNC: 141 MMOL/L (ref 135–153)
TRIGL SERPL-MCNC: 223 MG/DL (ref 0–150)
TSH SERPL DL<=0.05 MIU/L-ACNC: 2.13 MIU/ML (ref 0.55–4.78)
VLDLC SERPL-MCNC: 44.6 MG/DL

## 2018-02-28 PROCEDURE — 86140 C-REACTIVE PROTEIN: CPT

## 2018-02-28 PROCEDURE — 80061 LIPID PANEL: CPT

## 2018-02-28 PROCEDURE — 84443 ASSAY THYROID STIM HORMONE: CPT

## 2018-02-28 PROCEDURE — 80053 COMPREHEN METABOLIC PANEL: CPT

## 2018-02-28 PROCEDURE — 93925 LOWER EXTREMITY STUDY: CPT

## 2018-02-28 PROCEDURE — 93925 LOWER EXTREMITY STUDY: CPT | Performed by: RADIOLOGY

## 2018-02-28 PROCEDURE — 36415 COLL VENOUS BLD VENIPUNCTURE: CPT

## 2018-03-01 ENCOUNTER — TELEPHONE (OUTPATIENT)
Dept: CARDIOLOGY | Facility: CLINIC | Age: 53
End: 2018-03-01

## 2018-03-01 NOTE — TELEPHONE ENCOUNTER
Advised pt of Camille's recommendations.  He was agreeable and expressed understanding.  Pt also asked about his LE doppler.  Please refer to doppler result note:    Result Notes   Notes Recorded by CHRIS Kohli on 2/28/2018 at 12:37 PM  RIght lower extremity with diffuse plaquing noted, but no focal stenosis. Patient is on plavix and atorvastatin. Would also start low dose Aspirin 81mg daily if there is not a reason that he isn't taking it. Forward copy to PCP. May have to consider Ct angiogram if continued pains.

## 2018-03-01 NOTE — TELEPHONE ENCOUNTER
----- Message from CHRIS Kohli sent at 2/28/2018 12:40 PM EST -----  Labs ok. Bad cholesterol controlled on current meds. Triglycerides mildly elevated. He is already on fish oil and fenofibrate.     Can increase fish oil to 1000mg BID. Avoid sugars and carbohydrates.

## 2018-03-01 NOTE — TELEPHONE ENCOUNTER
"I advised pt of Camille's recommendation to start 81 mg ASA daily; he stated he was not aware he was supposed to be taking it with the Plavix, but he will start.  He notes his legs have hurt \"for years\" and this has not changed.  He is wanting to know about CT angiogram, if it can be done.      Copy of result has been sent to PCP.   "

## 2018-03-09 NOTE — TELEPHONE ENCOUNTER
Have him discuss with Dr. Gonzales at his next visit. If he would like a sooner follow up, see what we have available.

## 2018-03-12 NOTE — TELEPHONE ENCOUNTER
Spoke with patient I advised him that he would need to discuss this with Christian at his next follow-up visit or we could make him a sooner apt. He expressed verbal understanding and stated he would like a sooner apt. I advised him that I would see what I would find for him and and call him back with something.

## 2018-08-13 DIAGNOSIS — E78.5 DYSLIPIDEMIA: Primary | ICD-10-CM

## 2018-08-13 RX ORDER — FENOFIBRATE 145 MG/1
TABLET, COATED ORAL
Qty: 30 TABLET | Refills: 6 | Status: SHIPPED | OUTPATIENT
Start: 2018-08-13 | End: 2018-09-21 | Stop reason: ALTCHOICE

## 2018-08-13 RX ORDER — FENOFIBRATE 48 MG/1
96 TABLET, COATED ORAL DAILY
Qty: 30 TABLET | Refills: 3 | Status: SHIPPED | OUTPATIENT
Start: 2018-08-13 | End: 2018-08-13 | Stop reason: SDUPTHER

## 2018-09-21 ENCOUNTER — OFFICE VISIT (OUTPATIENT)
Dept: CARDIOLOGY | Facility: CLINIC | Age: 53
End: 2018-09-21

## 2018-09-21 VITALS
HEART RATE: 97 BPM | OXYGEN SATURATION: 95 % | DIASTOLIC BLOOD PRESSURE: 103 MMHG | WEIGHT: 261 LBS | HEIGHT: 73 IN | SYSTOLIC BLOOD PRESSURE: 165 MMHG | BODY MASS INDEX: 34.59 KG/M2

## 2018-09-21 DIAGNOSIS — I73.9 PERIPHERAL ARTERIAL DISEASE (HCC): Primary | ICD-10-CM

## 2018-09-21 DIAGNOSIS — I25.10 CORONARY ARTERY DISEASE INVOLVING NATIVE CORONARY ARTERY OF NATIVE HEART WITHOUT ANGINA PECTORIS: ICD-10-CM

## 2018-09-21 DIAGNOSIS — I10 ESSENTIAL HYPERTENSION: ICD-10-CM

## 2018-09-21 DIAGNOSIS — I25.10 ASCVD (ARTERIOSCLEROTIC CARDIOVASCULAR DISEASE): ICD-10-CM

## 2018-09-21 DIAGNOSIS — Z72.0 TOBACCO ABUSE: ICD-10-CM

## 2018-09-21 DIAGNOSIS — E78.5 DYSLIPIDEMIA: ICD-10-CM

## 2018-09-21 PROCEDURE — 99214 OFFICE O/P EST MOD 30 MIN: CPT | Performed by: PHYSICIAN ASSISTANT

## 2018-09-21 PROCEDURE — 93000 ELECTROCARDIOGRAM COMPLETE: CPT | Performed by: PHYSICIAN ASSISTANT

## 2018-09-21 RX ORDER — FENOFIBRATE 145 MG/1
TABLET, COATED ORAL
Qty: 30 TABLET | Refills: 6 | Status: SHIPPED | OUTPATIENT
Start: 2018-09-21 | End: 2019-04-19 | Stop reason: SDUPTHER

## 2018-09-21 RX ORDER — METOPROLOL SUCCINATE 25 MG/1
25 TABLET, EXTENDED RELEASE ORAL DAILY
Qty: 90 TABLET | Refills: 3 | Status: SHIPPED | OUTPATIENT
Start: 2018-09-21 | End: 2019-02-12 | Stop reason: SDUPTHER

## 2018-09-21 RX ORDER — ATORVASTATIN CALCIUM 40 MG/1
40 TABLET, FILM COATED ORAL DAILY
Qty: 30 TABLET | Refills: 3 | Status: ON HOLD | OUTPATIENT
Start: 2018-09-21 | End: 2019-01-03

## 2018-09-21 NOTE — PROGRESS NOTES
Marta Davis, APRN  Carlos Carson  1965 09/21/2018    Patient Active Problem List   Diagnosis   • Essential hypertension   • Coronary artery disease without angina pectoris, non-hemodynamically significant   • PVD (peripheral vascular disease) (CMS/HCC)   • Dyslipidemia   • Tobacco abuse   • Simple chronic bronchitis (CMS/HCC)   • Obesity (BMI 30-39.9)   • Cough due to ACE inhibitor (Lisinopril)   • Chronic bronchitis (CMS/HCC)   • ASCVD (arteriosclerotic cardiovascular disease), apparently nonobstructive, clinically stable.   • Peripheral arterial disease, involving both lower extremities with bilateral leg claudication.   • Chronic fatigue       Dear Marta Davis, APRN:    Subjective       History of Present Illness:    Chief Complaint   Patient presents with   • Follow-up   • Med Management     Verbal med list.    • Shortness of Breath     Due to smoking.        Carlos Carson is a pleasant 53 y.o. male with a past medical history significant for nonobstructive coronary artery disease, dyslipidemia, essential hypertension, severe PAD involving both lower extremities, tobacco abuse, and COPD.  Patient comes in for routine cardiology follow-up.    Patient states he recently discontinued all his medications as he was feeling weak and fatigued and not his normal self.  After discontinuing all of his medications he found that he did have his energy return to him.  He does state that he still has severe claudication in his lower extremities even at rest he has pain.  He denies any chest pain or shortness of breath but his exertion is limited due to the pain in his legs.  He states that he was seen by a surgeon in [facility] and was told he had to get his legs amputated.      Allergies   Allergen Reactions   • Lisinopril Cough   :      Current Outpatient Prescriptions:   •  amLODIPine (NORVASC) 10 MG tablet, Take 1 tablet by mouth Every Morning., Disp: 90 tablet, Rfl: 2  •  gabapentin  "(NEURONTIN) 800 MG tablet, Take 800 mg by mouth 4 (Four) Times a Day., Disp: , Rfl: 0  •  HYDROcodone-acetaminophen (NORCO) 7.5-325 MG per tablet, Take 7.5 tablets by mouth 2 (Two) Times a Day., Disp: , Rfl: 0  •  ibuprofen (ADVIL,MOTRIN) 800 MG tablet, Take 800 mg by mouth 3 (Three) Times a Day., Disp: , Rfl: 2  •  Omega-3 Fatty Acids (FISH OIL) 1200 MG capsule capsule, Take 1,200 mg by mouth Daily., Disp: , Rfl:   •  atorvastatin (LIPITOR) 40 MG tablet, Take 1 tablet by mouth Daily., Disp: 30 tablet, Rfl: 3  •  fenofibrate (TRICOR) 145 MG tablet, 145 mg by mouth daily take 1 tablet daily, Disp: 30 tablet, Rfl: 6  •  metoprolol succinate XL (TOPROL-XL) 25 MG 24 hr tablet, Take 1 tablet by mouth Daily., Disp: 90 tablet, Rfl: 3      The following portions of the patient's history were reviewed and updated as appropriate: allergies, current medications, past family history, past medical history, past social history, past surgical history and problem list.    Social History   Substance Use Topics   • Smoking status: Current Every Day Smoker     Packs/day: 2.00     Types: Cigarettes   • Smokeless tobacco: Not on file   • Alcohol use 3.6 oz/week     6 Cans of beer per week       Review of Systems   Constitution: Negative for weakness and malaise/fatigue.   Cardiovascular: Positive for claudication and cyanosis. Negative for chest pain, leg swelling, palpitations and syncope.   Respiratory: Positive for shortness of breath (Due to smoking. ).    Neurological: Negative for dizziness.       Objective   Vitals:    09/21/18 0834   BP: (!) 165/103   BP Location: Left arm   Patient Position: Sitting   Cuff Size: Adult   Pulse: 97   SpO2: 95%   Weight: 118 kg (261 lb)   Height: 185.4 cm (73\")     Body mass index is 34.43 kg/m².    Duplex of lower extremity 2/28/2018  IMPRESSION:  Atherosclerotic plaques are noted involving the arteries of  both lower extremities. In the right leg the patient only has monophasic  flow patterns " with very slow velocities at the ankle. This is felt to be  due to accumulation of the diffuse plaquing. No focal areas of stenosis  or occlusion were demonstrated. The left leg shows triphasic flow with  normal velocities.     This report was finalized on 2/28/2018 8:47 AM by Dr. Tommie Fuentes II, MD.    Physical Exam   Constitutional: He is oriented to person, place, and time. He appears well-developed and well-nourished. No distress.   Cardiovascular: Normal rate, regular rhythm and normal heart sounds.  Exam reveals no friction rub.    No murmur heard.  Pulses:       Dorsalis pedis pulses are 0 on the right side, and 0 on the left side.   Pulmonary/Chest: Effort normal and breath sounds normal. No respiratory distress.   Musculoskeletal: He exhibits no edema.   Neurological: He is alert and oriented to person, place, and time.   Skin: He is not diaphoretic.       Lab Results   Component Value Date     02/28/2018    K 4.9 02/28/2018     02/28/2018    CO2 27.2 02/28/2018    BUN 13 02/28/2018    CREATININE 0.98 02/28/2018    GLUCOSE 104 02/28/2018    CALCIUM 9.4 02/28/2018    AST 22 02/28/2018    ALT 27 02/28/2018    ALKPHOS 83 02/28/2018     No results found for: CKTOTAL  No results found for: WBC, HGB, HCT, PLT  No results found for: INR  No results found for: MG  Lab Results   Component Value Date    TSH 2.129 02/28/2018    TRIG 223 (H) 02/28/2018    HDL 32 (L) 02/28/2018    LDL 49 02/28/2018      No results found for: BNP    During this visit the following were done:  Labs Reviewed [x]    Labs Ordered []    Radiology Reports Reviewed [x]    Radiology Ordered []    PCP Records Reviewed []    Referring Provider Records Reviewed []    ER Records Reviewed []    Hospital Records Reviewed []    History Obtained From Family []    Radiology Images Reviewed []    Other Reviewed [x]    Records Requested []        ECG 12 Lead  Date/Time: 9/21/2018 8:31 AM  Performed by: CLIFTON MSYTH  Authorized by:  CLIFTON SMYTH   Comparison: compared with previous ECG from 2/26/2018  Similar to previous ECG  Rhythm: sinus rhythm  Clinical impression: abnormal ECG and non-specific ECG  Comments: Chronic ST elevations in leads 2, 3, and AVf                Assessment/Plan    Diagnosis Plan   1. Peripheral arterial disease, involving both lower extremities with bilateral leg claudication.  Ambulatory Referral to Cardiology   2. ASCVD (arteriosclerotic cardiovascular disease), apparently nonobstructive, clinically stable.  Ambulatory Referral to Cardiology   3. Coronary artery disease involving native coronary artery of native heart without angina pectoris  Ambulatory Referral to Cardiology   4. Essential hypertension     5. Tobacco abuse     6. Dyslipidemia                  Recommendations:  1. I discussed the result of his arterial Doppler lower extremities  2. I will also refer him to Dr. Manriquez at [facility] for possible surgery on his lower extremities due to his PAD.  Patient states that he also had a CT of his legs that revealed 99% blockage in both legs.  3. I also encouraged patient to start taking most of his medicines back which she agreed so his regimen for now will be amlodipine, atorvastatin, and fenofibrate.  4. Since his original blood pressure medicines caused weakness and fatigue I will attempt to control blood pressure with different medications, I will start metoprolol XL 25 mg daily.  5. Patient's Body mass index is 34.43 kg/m². BMI is above normal parameters. Recommendations include: educational material.  6.  Also provided with tobacco cessation education.  7. All up in 4 weeks    Return in about 4 weeks (around 10/19/2018).    As always, I appreciate very much the opportunity to participate in the cardiovascular care of your patients.      With Best Regards,    YAHIR Gomze disclaimer:  Much of this encounter note is an electronic transcription/translation of spoken  language to printed text. The electronic translation of spoken language may permit erroneous, or at times, nonsensical words or phrases to be inadvertently transcribed; Although I have reviewed the note for such errors, some may still exist.

## 2018-11-09 ENCOUNTER — OFFICE VISIT (OUTPATIENT)
Dept: CARDIOLOGY | Facility: CLINIC | Age: 53
End: 2018-11-09

## 2018-11-09 VITALS
SYSTOLIC BLOOD PRESSURE: 173 MMHG | WEIGHT: 264.8 LBS | BODY MASS INDEX: 35.09 KG/M2 | DIASTOLIC BLOOD PRESSURE: 106 MMHG | OXYGEN SATURATION: 100 % | HEART RATE: 86 BPM | HEIGHT: 73 IN | RESPIRATION RATE: 18 BRPM

## 2018-11-09 DIAGNOSIS — I10 ESSENTIAL HYPERTENSION: Primary | ICD-10-CM

## 2018-11-09 DIAGNOSIS — I25.10 ASCVD (ARTERIOSCLEROTIC CARDIOVASCULAR DISEASE): ICD-10-CM

## 2018-11-09 DIAGNOSIS — I25.10 CORONARY ARTERY DISEASE INVOLVING NATIVE CORONARY ARTERY OF NATIVE HEART WITHOUT ANGINA PECTORIS: ICD-10-CM

## 2018-11-09 DIAGNOSIS — E78.5 DYSLIPIDEMIA: ICD-10-CM

## 2018-11-09 DIAGNOSIS — Z72.0 TOBACCO ABUSE: ICD-10-CM

## 2018-11-09 PROCEDURE — 99406 BEHAV CHNG SMOKING 3-10 MIN: CPT | Performed by: NURSE PRACTITIONER

## 2018-11-09 PROCEDURE — 99214 OFFICE O/P EST MOD 30 MIN: CPT | Performed by: NURSE PRACTITIONER

## 2018-11-09 RX ORDER — VARENICLINE TARTRATE 1 MG/1
1 TABLET, FILM COATED ORAL 2 TIMES DAILY
Qty: 60 TABLET | Refills: 2 | Status: SHIPPED | OUTPATIENT
Start: 2018-11-09 | End: 2018-12-07 | Stop reason: SDUPTHER

## 2018-11-09 RX ORDER — ASPIRIN 81 MG/1
81 TABLET, CHEWABLE ORAL DAILY
Status: ON HOLD | COMMUNITY
End: 2020-12-04

## 2018-11-09 NOTE — PROGRESS NOTES
Marta Davis, APRN  Carlos Carson  1965 11/09/2018    Patient Active Problem List   Diagnosis   • Essential hypertension   • Coronary artery disease without angina pectoris, non-hemodynamically significant   • PVD (peripheral vascular disease) (CMS/HCC)   • Dyslipidemia   • Tobacco abuse   • Simple chronic bronchitis (CMS/HCC)   • Obesity (BMI 30-39.9)   • Cough due to ACE inhibitor (Lisinopril)   • Chronic bronchitis (CMS/HCC)   • ASCVD (arteriosclerotic cardiovascular disease), apparently nonobstructive, clinically stable.   • Peripheral arterial disease, involving both lower extremities with bilateral leg claudication.   • Chronic fatigue       Dear Marta Davis, APRN:    Subjective     Chief Complaint   Patient presents with   • Follow-up   • ASCVD           History of Present Illness:    Carlos Carson is a 53 y.o. male with a history of nonobstructive coronary artery disease, dyslipidemia, hypertension, severe peripheral arterial disease, COPD, and tobacco abuse.  He is here today for routine cardiology follow-up.  He reports he has an appointment with interventional cardiology in a few days to discuss peripheral arterial disease and possible intervention.  He does have chronic lower extremity pains.  He denies chest pain.  He has chronic shortness of breath he relates to the tobacco abuse.  This has not recently worsened.  At his last visit, his medications were changed.  He reports he has been taking metoprolol and amlodipine as prescribed since his last visit in September.  His blood pressure has been 130 systolic and 80s diastolic.  However, 3 or 4 days ago he stopped taking the amlodipine because he was unsure if we had actually intended for him to be on the medication.  His blood pressure is markedly elevated in the office today.  He denies palpitations, lightheadedness, dizziness, near syncope.  He denies headache, visual disturbance, and paresthesias.  He is very motivated to  quit smoking and would like to try Chantix.          Allergies   Allergen Reactions   • Lisinopril Cough   :      Current Outpatient Prescriptions:   •  amLODIPine (NORVASC) 10 MG tablet, Take 1 tablet by mouth Every Morning., Disp: 90 tablet, Rfl: 2  •  aspirin 81 MG chewable tablet, Chew 81 mg Daily., Disp: , Rfl:   •  atorvastatin (LIPITOR) 40 MG tablet, Take 1 tablet by mouth Daily., Disp: 30 tablet, Rfl: 3  •  fenofibrate (TRICOR) 145 MG tablet, 145 mg by mouth daily take 1 tablet daily, Disp: 30 tablet, Rfl: 6  •  gabapentin (NEURONTIN) 800 MG tablet, Take 800 mg by mouth 4 (Four) Times a Day., Disp: , Rfl: 0  •  HYDROcodone-acetaminophen (NORCO) 7.5-325 MG per tablet, Take 7.5 tablets by mouth 2 (Two) Times a Day., Disp: , Rfl: 0  •  ibuprofen (ADVIL,MOTRIN) 800 MG tablet, Take 800 mg by mouth 3 (Three) Times a Day., Disp: , Rfl: 2  •  metoprolol succinate XL (TOPROL-XL) 25 MG 24 hr tablet, Take 1 tablet by mouth Daily., Disp: 90 tablet, Rfl: 3  •  Omega-3 Fatty Acids (FISH OIL) 1200 MG capsule capsule, Take 1,200 mg by mouth Daily., Disp: , Rfl:   •  varenicline (CHANTIX CONTINUING MONTH PAK) 1 MG tablet, Take 1 tablet by mouth 2 (Two) Times a Day., Disp: 60 tablet, Rfl: 2  •  varenicline (CHANTIX STARTING MONTH PAK) 0.5 MG X 11 & 1 MG X 42 tablet, Take 0.5 mg one daily on days 1-3 and and 0.5 mg twice daily on days 4-7.Then 1 mg twice daily for a total of 12 weeks., Disp: 53 tablet, Rfl: 0      The following portions of the patient's history were reviewed and updated as appropriate: allergies, current medications, past family history, past medical history, past social history, past surgical history and problem list.    Social History   Substance Use Topics   • Smoking status: Current Every Day Smoker     Packs/day: 2.00     Types: Cigarettes   • Smokeless tobacco: Not on file   • Alcohol use 3.6 oz/week     6 Cans of beer per week       Review of Systems   Constitution: Negative for weakness and  "malaise/fatigue.   Cardiovascular: Negative for chest pain, claudication, cyanosis, dyspnea on exertion, irregular heartbeat, leg swelling, near-syncope, orthopnea, palpitations, paroxysmal nocturnal dyspnea and syncope.   Respiratory: Positive for shortness of breath. Negative for cough and wheezing.    Neurological: Negative for dizziness and light-headedness.       Objective   Vitals:    11/09/18 0852   BP: (!) 173/106   BP Location: Left arm   Patient Position: Sitting   Cuff Size: Adult   Pulse: 86   Resp: 18   SpO2: 100%   Weight: 120 kg (264 lb 12.8 oz)   Height: 185.4 cm (73\")     Body mass index is 34.94 kg/m².        Physical Exam    Lab Results   Component Value Date     02/28/2018    K 4.9 02/28/2018     02/28/2018    CO2 27.2 02/28/2018    BUN 13 02/28/2018    CREATININE 0.98 02/28/2018    GLUCOSE 104 02/28/2018    CALCIUM 9.4 02/28/2018    AST 22 02/28/2018    ALT 27 02/28/2018    ALKPHOS 83 02/28/2018       Lab Results   Component Value Date    TSH 2.129 02/28/2018    TRIG 223 (H) 02/28/2018    HDL 32 (L) 02/28/2018    LDL 49 02/28/2018          Procedures      Assessment/Plan    Diagnosis Plan   1. Essential hypertension     2. Coronary artery disease involving native coronary artery of native heart without angina pectoris     3. ASCVD (arteriosclerotic cardiovascular disease), apparently nonobstructive, clinically stable.     4. Dyslipidemia     5. Tobacco abuse  varenicline (CHANTIX STARTING MONTH GENESIS) 0.5 MG X 11 & 1 MG X 42 tablet    varenicline (CHANTIX CONTINUING MONTH GENESIS) 1 MG tablet                Recommendations:    1. We have discussed his medications at length today. He will resume amlodipine and continue metoprolol. I have asked him to monitor his blood pressure at home. If his readings are >130/80 I have asked him to call me and let me know.    2. Continue low dose aspirin, atorvastatin, and fenofibrate    3. I advised Carlos of the risks of continuing to use tobacco, and I " provided him with tobacco cessation educational materials in the After Visit Summary.   During this visit, I spent 6 minutes counseling the patient regarding tobacco cessation.     4. He would like to try Chantix. I have discussed administration and possible SE of the medication. If he has any undesirable SE he will stop the medication and let us know. A starter pack and continuing pack have been sent to his pharmacy.    5. Continue follow up with interventional cardiology as planned.    6. Follow up here in 3 months and PRN               Return in about 3 months (around 2/9/2019) for Recheck.    As always, I appreciate very much the opportunity to participate in the cardiovascular care of your patients.      With Best Regards,    ENID Davenport

## 2018-11-09 NOTE — PATIENT INSTRUCTIONS
Steps to Quit Smoking  Smoking tobacco can be harmful to your health and can affect almost every organ in your body. Smoking puts you, and those around you, at risk for developing many serious chronic diseases. Quitting smoking is difficult, but it is one of the best things that you can do for your health. It is never too late to quit.  What are the benefits of quitting smoking?  When you quit smoking, you lower your risk of developing serious diseases and conditions, such as:  · Lung cancer or lung disease, such as COPD.  · Heart disease.  · Stroke.  · Heart attack.  · Infertility.  · Osteoporosis and bone fractures.    Additionally, symptoms such as coughing, wheezing, and shortness of breath may get better when you quit. You may also find that you get sick less often because your body is stronger at fighting off colds and infections. If you are pregnant, quitting smoking can help to reduce your chances of having a baby of low birth weight.  How do I get ready to quit?  When you decide to quit smoking, create a plan to make sure that you are successful. Before you quit:  · Pick a date to quit. Set a date within the next two weeks to give you time to prepare.  · Write down the reasons why you are quitting. Keep this list in places where you will see it often, such as on your bathroom mirror or in your car or wallet.  · Identify the people, places, things, and activities that make you want to smoke (triggers) and avoid them. Make sure to take these actions:  ? Throw away all cigarettes at home, at work, and in your car.  ? Throw away smoking accessories, such as ashtrays and lighters.  ? Clean your car and make sure to empty the ashtray.  ? Clean your home, including curtains and carpets.  · Tell your family, friends, and coworkers that you are quitting. Support from your loved ones can make quitting easier.  · Talk with your health care provider about your options for quitting smoking.  · Find out what treatment  options are covered by your health insurance.    What strategies can I use to quit smoking?  Talk with your healthcare provider about different strategies to quit smoking. Some strategies include:  · Quitting smoking altogether instead of gradually lessening how much you smoke over a period of time. Research shows that quitting “cold turkey” is more successful than gradually quitting.  · Attending in-person counseling to help you build problem-solving skills. You are more likely to have success in quitting if you attend several counseling sessions. Even short sessions of 10 minutes can be effective.  · Finding resources and support systems that can help you to quit smoking and remain smoke-free after you quit. These resources are most helpful when you use them often. They can include:  ? Online chats with a counselor.  ? Telephone quitlines.  ? Printed self-help materials.  ? Support groups or group counseling.  ? Text messaging programs.  ? Mobile phone applications.  · Taking medicines to help you quit smoking. (If you are pregnant or breastfeeding, talk with your health care provider first.) Some medicines contain nicotine and some do not. Both types of medicines help with cravings, but the medicines that include nicotine help to relieve withdrawal symptoms. Your health care provider may recommend:  ? Nicotine patches, gum, or lozenges.  ? Nicotine inhalers or sprays.  ? Non-nicotine medicine that is taken by mouth.    Talk with your health care provider about combining strategies, such as taking medicines while you are also receiving in-person counseling. Using these two strategies together makes you more likely to succeed in quitting than if you used either strategy on its own.  If you are pregnant or breastfeeding, talk with your health care provider about finding counseling or other support strategies to quit smoking. Do not take medicine to help you quit smoking unless told to do so by your health care  provider.  What things can I do to make it easier to quit?  Quitting smoking might feel overwhelming at first, but there is a lot that you can do to make it easier. Take these important actions:  · Reach out to your family and friends and ask that they support and encourage you during this time. Call telephone quitlines, reach out to support groups, or work with a counselor for support.  · Ask people who smoke to avoid smoking around you.  · Avoid places that trigger you to smoke, such as bars, parties, or smoke-break areas at work.  · Spend time around people who do not smoke.  · Lessen stress in your life, because stress can be a smoking trigger for some people. To lessen stress, try:  ? Exercising regularly.  ? Deep-breathing exercises.  ? Yoga.  ? Meditating.  ? Performing a body scan. This involves closing your eyes, scanning your body from head to toe, and noticing which parts of your body are particularly tense. Purposefully relax the muscles in those areas.  · Download or purchase mobile phone or tablet apps (applications) that can help you stick to your quit plan by providing reminders, tips, and encouragement. There are many free apps, such as QuitGuide from the CDC (Centers for Disease Control and Prevention). You can find other support for quitting smoking (smoking cessation) through smokefree.gov and other websites.    How will I feel when I quit smoking?  Within the first 24 hours of quitting smoking, you may start to feel some withdrawal symptoms. These symptoms are usually most noticeable 2-3 days after quitting, but they usually do not last beyond 2-3 weeks. Changes or symptoms that you might experience include:  · Mood swings.  · Restlessness, anxiety, or irritation.  · Difficulty concentrating.  · Dizziness.  · Strong cravings for sugary foods in addition to nicotine.  · Mild weight gain.  · Constipation.  · Nausea.  · Coughing or a sore throat.  · Changes in how your medicines work in your  body.  · A depressed mood.  · Difficulty sleeping (insomnia).    After the first 2-3 weeks of quitting, you may start to notice more positive results, such as:  · Improved sense of smell and taste.  · Decreased coughing and sore throat.  · Slower heart rate.  · Lower blood pressure.  · Clearer skin.  · The ability to breathe more easily.  · Fewer sick days.    Quitting smoking is very challenging for most people. Do not get discouraged if you are not successful the first time. Some people need to make many attempts to quit before they achieve long-term success. Do your best to stick to your quit plan, and talk with your health care provider if you have any questions or concerns.  This information is not intended to replace advice given to you by your health care provider. Make sure you discuss any questions you have with your health care provider.  Document Released: 12/12/2002 Document Revised: 08/15/2017 Document Reviewed: 05/03/2016  [employer] Patient Education © 2018 [employer]

## 2018-11-12 ENCOUNTER — OFFICE VISIT (OUTPATIENT)
Dept: CARDIOLOGY | Facility: CLINIC | Age: 53
End: 2018-11-12

## 2018-11-12 VITALS
HEIGHT: 73 IN | SYSTOLIC BLOOD PRESSURE: 146 MMHG | BODY MASS INDEX: 34.54 KG/M2 | HEART RATE: 88 BPM | WEIGHT: 260.6 LBS | OXYGEN SATURATION: 100 % | DIASTOLIC BLOOD PRESSURE: 99 MMHG

## 2018-11-12 DIAGNOSIS — I73.9 PAD (PERIPHERAL ARTERY DISEASE) (HCC): Primary | ICD-10-CM

## 2018-11-12 PROCEDURE — 99214 OFFICE O/P EST MOD 30 MIN: CPT | Performed by: INTERNAL MEDICINE

## 2018-11-12 NOTE — H&P (VIEW-ONLY)
[facility] CARDIOLOGY  [address]  [place] [address]  Phone: [phone]  Fax: [phone]    11/12/2018    Chief Complaint   Patient presents with   • Peripheral Vascular Disease        History:   Carlos Carson is a 53 y.o. male seen in consultation, referred by Dr.Devin VICKIE Dockery PA-C  for PAD. Patient states he has been experiencing this pain and swelling for the past three to four years. Patient states he has constant pain in both lower extremities with swelling. He will prop his legs up sometimes and when he does he says that the pain does subside. He states he is not able to walk through [place] anymore without having to stop and prop hisself up to rest his legs because they cramp so bad.     Patient says symptoms began roughly 2 years ago and he had seen a doctor in [place] who told him that he may end up with amputations. He also has a lower back problem with lumbar disc disease and his feet get numb when he sits for long periods of time.    **Duplex Lower Extremity 02/28/18     Past Medical History:   Diagnosis Date   • Hyperlipidemia    • Hypertension    • PAD (peripheral artery disease) (CMS/[facility])        Past Surgical History:   Procedure Laterality Date   • CARDIAC CATHETERIZATION  2011        Review of Systems:  Please see HPI  Constitution: No chills, no rigors, no unexplained weight loss or weight gain  Eyes:  No diplopia, no blurred vision, no loss of vision, conjunctiva is pink and sclera is anicteric  ENT:  No tinnitus, no otorrhea, no epistaxis, no sore throat   Respiratory: No cough, no hemoptysis  Cardiovascular: see HPI  Gastrointestinal: No nausea, no vomiting, no hematemesis, no diarrhea or constipation, no melena  Genitourinary: No frequency of dysuria no hematuria  Integument: No pruritis and  no skin rash  Hematologic / Lymphatic: No excessive bleeding, easy bruising, fatigue, lymphadenopathy and petechiae  Musculoskeletal: No joint pain, joint  stiffness, joint swelling, muscle pain, muscle weakness and neck pain  Neurological: No dizziness, headaches, light headedness, seizures and vertigo  Endocrine: No frequent urination and nocturia, temperature intolerance, weight gain, unintended and weight loss, unintended        Past Social History:  Social History     Socioeconomic History   • Marital status: Unknown     Spouse name: Not on file   • Number of children: Not on file   • Years of education: Not on file   • Highest education level: Not on file   Tobacco Use   • Smoking status: Current Every Day Smoker     Packs/day: 2.00     Types: Cigarettes   • Smokeless tobacco: Never Used   Substance and Sexual Activity   • Alcohol use: Yes     Alcohol/week: 3.6 oz     Types: 6 Cans of beer per week   • Drug use: No   • Sexual activity: Defer       Past Family History:  Family History   Problem Relation Age of Onset   • Hypertension Mother    • Heart disease Father 65        CABG x3v 99% blockage   • No Known Problems Sister    • No Known Problems Brother        Current Outpatient Medications   Medication Sig Dispense Refill   • aspirin 81 MG chewable tablet Chew 81 mg Daily.     • atorvastatin (LIPITOR) 40 MG tablet Take 1 tablet by mouth Daily. 30 tablet 3   • fenofibrate (TRICOR) 145 MG tablet 145 mg by mouth daily take 1 tablet daily 30 tablet 6   • gabapentin (NEURONTIN) 800 MG tablet Take 800 mg by mouth 4 (Four) Times a Day.  0   • HYDROcodone-acetaminophen (NORCO) 7.5-325 MG per tablet Take 7.5 tablets by mouth 2 (Two) Times a Day.  0   • ibuprofen (ADVIL,MOTRIN) 800 MG tablet Take 800 mg by mouth 3 (Three) Times a Day.  2   • metoprolol succinate XL (TOPROL-XL) 25 MG 24 hr tablet Take 1 tablet by mouth Daily. 90 tablet 3   • Omega-3 Fatty Acids (FISH OIL) 1200 MG capsule capsule Take 1,200 mg by mouth Daily.     • varenicline (CHANTIX CONTINUING MONTH GENESIS) 1 MG tablet Take 1 tablet by mouth 2 (Two) Times a Day. 60 tablet 2   • amLODIPine (NORVASC) 10 MG  tablet Take 1 tablet by mouth Every Morning. 90 tablet 2     No current facility-administered medications for this visit.         Allergies   Allergen Reactions   • Lisinopril Cough       Objective:  Vitals:    11/12/18 1333   BP: 146/99   Pulse: 88   SpO2: 100%         Comfortable NAD  PERRL, conjunctiva clear  Neck supple, no JVD or thyromegaly appreciated  S1/S2 RRR, no m/r/g  Lungs CTA B, normal effort  Abdomen S/NT/ND (+) BS, no HSM appreciated  Extremities warm, no clubbing, cyanosis, or edema  Normal gait  No visible or palpable skin lesions  A/Ox4, mood and affect appropriate  Pulse exam:   Feet are warm bilateral  1+ edema bilateral  Capillary refill is normal  No evidence of ulceration or color change of the toes  PULSES  Right DP and PT are 0-1+ and Left DP and PT are 0-1+    DATA:      Results for orders placed in visit on 11/14/16   SCANNED - ECHOCARDIOGRAM               Procedures     A/P:  Severe lifestyle limiting claudication  No ulceration or CLI  Severe PAD by Duplex showing right worse than left but monophasic waveforms noted.  Several cardiac caths (5) in [place] showing moderate stenosis but no interventions. Last cath 5 yrs ago by Dr. Gallardo    Plan  Peripheral arteriogram with bilateral LE runoff, focus on right leg first  ASA 81 mg to continue  Pt is already on Lipitor   He is on Chantix with effort to quit    Patient's Body mass index is 34.38 kg/m². BMI is above normal parameters. Recommendations include: nutrition counseling.       No diagnosis found.     Thank you for allowing me to participate in the care of Carlos Carson. Feel free to contact me directly with any further questions or concerns.

## 2018-11-12 NOTE — PROGRESS NOTES
[facility] CARDIOLOGY  [address]  [place] [address]  Phone: [phone]  Fax: [phone]    11/12/2018    Chief Complaint   Patient presents with   • Peripheral Vascular Disease        History:   Carlos Carson is a 53 y.o. male seen in consultation, referred by Dr.Devin VICKIE Dockery PA-C  for PAD. Patient states he has been experiencing this pain and swelling for the past three to four years. Patient states he has constant pain in both lower extremities with swelling. He will prop his legs up sometimes and when he does he says that the pain does subside. He states he is not able to walk through [place] anymore without having to stop and prop hisself up to rest his legs because they cramp so bad.     Patient says symptoms began roughly 2 years ago and he had seen a doctor in [place] who told him that he may end up with amputations. He also has a lower back problem with lumbar disc disease and his feet get numb when he sits for long periods of time.    **Duplex Lower Extremity 02/28/18     Past Medical History:   Diagnosis Date   • Hyperlipidemia    • Hypertension    • PAD (peripheral artery disease) (CMS/[facility])        Past Surgical History:   Procedure Laterality Date   • CARDIAC CATHETERIZATION  2011        Review of Systems:  Please see HPI  Constitution: No chills, no rigors, no unexplained weight loss or weight gain  Eyes:  No diplopia, no blurred vision, no loss of vision, conjunctiva is pink and sclera is anicteric  ENT:  No tinnitus, no otorrhea, no epistaxis, no sore throat   Respiratory: No cough, no hemoptysis  Cardiovascular: see HPI  Gastrointestinal: No nausea, no vomiting, no hematemesis, no diarrhea or constipation, no melena  Genitourinary: No frequency of dysuria no hematuria  Integument: No pruritis and  no skin rash  Hematologic / Lymphatic: No excessive bleeding, easy bruising, fatigue, lymphadenopathy and petechiae  Musculoskeletal: No joint pain, joint  stiffness, joint swelling, muscle pain, muscle weakness and neck pain  Neurological: No dizziness, headaches, light headedness, seizures and vertigo  Endocrine: No frequent urination and nocturia, temperature intolerance, weight gain, unintended and weight loss, unintended        Past Social History:  Social History     Socioeconomic History   • Marital status: Unknown     Spouse name: Not on file   • Number of children: Not on file   • Years of education: Not on file   • Highest education level: Not on file   Tobacco Use   • Smoking status: Current Every Day Smoker     Packs/day: 2.00     Types: Cigarettes   • Smokeless tobacco: Never Used   Substance and Sexual Activity   • Alcohol use: Yes     Alcohol/week: 3.6 oz     Types: 6 Cans of beer per week   • Drug use: No   • Sexual activity: Defer       Past Family History:  Family History   Problem Relation Age of Onset   • Hypertension Mother    • Heart disease Father 65        CABG x3v 99% blockage   • No Known Problems Sister    • No Known Problems Brother        Current Outpatient Medications   Medication Sig Dispense Refill   • aspirin 81 MG chewable tablet Chew 81 mg Daily.     • atorvastatin (LIPITOR) 40 MG tablet Take 1 tablet by mouth Daily. 30 tablet 3   • fenofibrate (TRICOR) 145 MG tablet 145 mg by mouth daily take 1 tablet daily 30 tablet 6   • gabapentin (NEURONTIN) 800 MG tablet Take 800 mg by mouth 4 (Four) Times a Day.  0   • HYDROcodone-acetaminophen (NORCO) 7.5-325 MG per tablet Take 7.5 tablets by mouth 2 (Two) Times a Day.  0   • ibuprofen (ADVIL,MOTRIN) 800 MG tablet Take 800 mg by mouth 3 (Three) Times a Day.  2   • metoprolol succinate XL (TOPROL-XL) 25 MG 24 hr tablet Take 1 tablet by mouth Daily. 90 tablet 3   • Omega-3 Fatty Acids (FISH OIL) 1200 MG capsule capsule Take 1,200 mg by mouth Daily.     • varenicline (CHANTIX CONTINUING MONTH GENESIS) 1 MG tablet Take 1 tablet by mouth 2 (Two) Times a Day. 60 tablet 2   • amLODIPine (NORVASC) 10 MG  tablet Take 1 tablet by mouth Every Morning. 90 tablet 2     No current facility-administered medications for this visit.         Allergies   Allergen Reactions   • Lisinopril Cough       Objective:  Vitals:    11/12/18 1333   BP: 146/99   Pulse: 88   SpO2: 100%         Comfortable NAD  PERRL, conjunctiva clear  Neck supple, no JVD or thyromegaly appreciated  S1/S2 RRR, no m/r/g  Lungs CTA B, normal effort  Abdomen S/NT/ND (+) BS, no HSM appreciated  Extremities warm, no clubbing, cyanosis, or edema  Normal gait  No visible or palpable skin lesions  A/Ox4, mood and affect appropriate  Pulse exam:   Feet are warm bilateral  1+ edema bilateral  Capillary refill is normal  No evidence of ulceration or color change of the toes  PULSES  Right DP and PT are 0-1+ and Left DP and PT are 0-1+    DATA:      Results for orders placed in visit on 11/14/16   SCANNED - ECHOCARDIOGRAM               Procedures     A/P:  Severe lifestyle limiting claudication  No ulceration or CLI  Severe PAD by Duplex showing right worse than left but monophasic waveforms noted.  Several cardiac caths (5) in [place] showing moderate stenosis but no interventions. Last cath 5 yrs ago by Dr. Gallardo    Plan  Peripheral arteriogram with bilateral LE runoff, focus on right leg first  ASA 81 mg to continue  Pt is already on Lipitor   He is on Chantix with effort to quit    Patient's Body mass index is 34.38 kg/m². BMI is above normal parameters. Recommendations include: nutrition counseling.       No diagnosis found.     Thank you for allowing me to participate in the care of Carlos Carson. Feel free to contact me directly with any further questions or concerns.

## 2018-11-13 PROBLEM — I73.9 PAD (PERIPHERAL ARTERY DISEASE): Status: ACTIVE | Noted: 2018-11-13

## 2018-11-16 ENCOUNTER — HOSPITAL ENCOUNTER (OUTPATIENT)
Facility: HOSPITAL | Age: 53
Setting detail: HOSPITAL OUTPATIENT SURGERY
Discharge: HOME | End: 2018-11-16
Attending: INTERNAL MEDICINE | Admitting: INTERNAL MEDICINE

## 2018-11-16 VITALS
SYSTOLIC BLOOD PRESSURE: 124 MMHG | HEIGHT: 73 IN | HEART RATE: 85 BPM | WEIGHT: 260 LBS | BODY MASS INDEX: 34.46 KG/M2 | DIASTOLIC BLOOD PRESSURE: 88 MMHG | RESPIRATION RATE: 18 BRPM | TEMPERATURE: 97.8 F | OXYGEN SATURATION: 96 %

## 2018-11-16 DIAGNOSIS — I73.9 PAD (PERIPHERAL ARTERY DISEASE) (HCC): ICD-10-CM

## 2018-11-16 LAB
ACT BLD: 164 SECONDS (ref 82–152)
ANION GAP SERPL CALCULATED.3IONS-SCNC: 3.3 MMOL/L (ref 3.6–11.2)
BUN BLD-MCNC: 14 MG/DL (ref 7–21)
BUN/CREAT SERPL: 15.7 (ref 7–25)
CALCIUM SPEC-SCNC: 9 MG/DL (ref 7.7–10)
CHLORIDE SERPL-SCNC: 111 MMOL/L (ref 99–112)
CO2 SERPL-SCNC: 27.7 MMOL/L (ref 24.3–31.9)
CREAT BLD-MCNC: 0.89 MG/DL (ref 0.43–1.29)
DEPRECATED RDW RBC AUTO: 39.8 FL (ref 37–54)
ERYTHROCYTE [DISTWIDTH] IN BLOOD BY AUTOMATED COUNT: 12 % (ref 11.5–14.5)
GFR SERPL CREATININE-BSD FRML MDRD: 89 ML/MIN/1.73
GLUCOSE BLD-MCNC: 105 MG/DL (ref 70–110)
HCT VFR BLD AUTO: 45.9 % (ref 42–52)
HGB BLD-MCNC: 16.2 G/DL (ref 14–18)
MCH RBC QN AUTO: 32.8 PG (ref 27–33)
MCHC RBC AUTO-ENTMCNC: 35.3 G/DL (ref 33–37)
MCV RBC AUTO: 92.9 FL (ref 80–94)
OSMOLALITY SERPL CALC.SUM OF ELEC: 284 MOSM/KG (ref 273–305)
PLATELET # BLD AUTO: 305 10*3/MM3 (ref 130–400)
PMV BLD AUTO: 10.1 FL (ref 6–10)
POTASSIUM BLD-SCNC: 4.2 MMOL/L (ref 3.5–5.3)
RBC # BLD AUTO: 4.94 10*6/MM3 (ref 4.7–6.1)
SODIUM BLD-SCNC: 142 MMOL/L (ref 135–153)
WBC NRBC COR # BLD: 7.59 10*3/MM3 (ref 4.5–12.5)

## 2018-11-16 PROCEDURE — 75716 ARTERY X-RAYS ARMS/LEGS: CPT | Performed by: INTERNAL MEDICINE

## 2018-11-16 PROCEDURE — 80048 BASIC METABOLIC PNL TOTAL CA: CPT | Performed by: INTERNAL MEDICINE

## 2018-11-16 PROCEDURE — 75625 CONTRAST EXAM ABDOMINL AORTA: CPT | Performed by: INTERNAL MEDICINE

## 2018-11-16 PROCEDURE — 25010000002 IOPAMIDOL 61 % SOLUTION: Performed by: INTERNAL MEDICINE

## 2018-11-16 PROCEDURE — C1769 GUIDE WIRE: HCPCS | Performed by: INTERNAL MEDICINE

## 2018-11-16 PROCEDURE — C2623 CATH, TRANSLUMIN, DRUG-COAT: HCPCS | Performed by: INTERNAL MEDICINE

## 2018-11-16 PROCEDURE — 85347 COAGULATION TIME ACTIVATED: CPT

## 2018-11-16 PROCEDURE — C1714 CATH, TRANS ATHERECTOMY, DIR: HCPCS | Performed by: INTERNAL MEDICINE

## 2018-11-16 PROCEDURE — 25010000002 FENTANYL CITRATE (PF) 100 MCG/2ML SOLUTION: Performed by: INTERNAL MEDICINE

## 2018-11-16 PROCEDURE — C1894 INTRO/SHEATH, NON-LASER: HCPCS | Performed by: INTERNAL MEDICINE

## 2018-11-16 PROCEDURE — C1887 CATHETER, GUIDING: HCPCS | Performed by: INTERNAL MEDICINE

## 2018-11-16 PROCEDURE — C1760 CLOSURE DEV, VASC: HCPCS | Performed by: INTERNAL MEDICINE

## 2018-11-16 PROCEDURE — 25010000002 HEPARIN (PORCINE) PER 1000 UNITS: Performed by: INTERNAL MEDICINE

## 2018-11-16 PROCEDURE — 85027 COMPLETE CBC AUTOMATED: CPT | Performed by: INTERNAL MEDICINE

## 2018-11-16 PROCEDURE — 25010000002 MIDAZOLAM PER 1 MG: Performed by: INTERNAL MEDICINE

## 2018-11-16 PROCEDURE — 37225 PR REVSC OPN/PRQ FEM/POP W/ATHRC/ANGIOP SM VSL: CPT | Performed by: INTERNAL MEDICINE

## 2018-11-16 RX ORDER — FENTANYL CITRATE 50 UG/ML
INJECTION, SOLUTION INTRAMUSCULAR; INTRAVENOUS AS NEEDED
Status: DISCONTINUED | OUTPATIENT
Start: 2018-11-16 | End: 2018-11-16 | Stop reason: HOSPADM

## 2018-11-16 RX ORDER — ASPIRIN 81 MG/1
81 TABLET ORAL DAILY
Status: DISCONTINUED | OUTPATIENT
Start: 2018-11-16 | End: 2018-11-16 | Stop reason: HOSPADM

## 2018-11-16 RX ORDER — ACETAMINOPHEN 325 MG/1
650 TABLET ORAL EVERY 4 HOURS PRN
Status: DISCONTINUED | OUTPATIENT
Start: 2018-11-16 | End: 2018-11-16 | Stop reason: HOSPADM

## 2018-11-16 RX ORDER — HEPARIN SODIUM 1000 [USP'U]/ML
INJECTION, SOLUTION INTRAVENOUS; SUBCUTANEOUS AS NEEDED
Status: DISCONTINUED | OUTPATIENT
Start: 2018-11-16 | End: 2018-11-16 | Stop reason: HOSPADM

## 2018-11-16 RX ORDER — MIDAZOLAM HYDROCHLORIDE 1 MG/ML
INJECTION INTRAMUSCULAR; INTRAVENOUS AS NEEDED
Status: DISCONTINUED | OUTPATIENT
Start: 2018-11-16 | End: 2018-11-16 | Stop reason: HOSPADM

## 2018-11-16 RX ORDER — SODIUM CHLORIDE 9 MG/ML
100 INJECTION, SOLUTION INTRAVENOUS ONCE
Status: DISCONTINUED | OUTPATIENT
Start: 2018-11-16 | End: 2018-11-16 | Stop reason: HOSPADM

## 2018-11-16 RX ORDER — CLOPIDOGREL BISULFATE 75 MG/1
300 TABLET ORAL ONCE
Status: COMPLETED | OUTPATIENT
Start: 2018-11-16 | End: 2018-11-16

## 2018-11-16 RX ORDER — ONDANSETRON 4 MG/1
4 TABLET, FILM COATED ORAL EVERY 6 HOURS PRN
Status: DISCONTINUED | OUTPATIENT
Start: 2018-11-16 | End: 2018-11-16 | Stop reason: HOSPADM

## 2018-11-16 RX ORDER — LIDOCAINE HYDROCHLORIDE 20 MG/ML
INJECTION, SOLUTION INFILTRATION; PERINEURAL AS NEEDED
Status: DISCONTINUED | OUTPATIENT
Start: 2018-11-16 | End: 2018-11-16 | Stop reason: HOSPADM

## 2018-11-16 RX ORDER — ONDANSETRON 4 MG/1
4 TABLET, ORALLY DISINTEGRATING ORAL EVERY 6 HOURS PRN
Status: DISCONTINUED | OUTPATIENT
Start: 2018-11-16 | End: 2018-11-16 | Stop reason: HOSPADM

## 2018-11-16 RX ORDER — SODIUM CHLORIDE 9 MG/ML
INJECTION, SOLUTION INTRAVENOUS CONTINUOUS PRN
Status: COMPLETED | OUTPATIENT
Start: 2018-11-16 | End: 2018-11-16

## 2018-11-16 RX ORDER — ONDANSETRON 2 MG/ML
4 INJECTION INTRAMUSCULAR; INTRAVENOUS EVERY 6 HOURS PRN
Status: DISCONTINUED | OUTPATIENT
Start: 2018-11-16 | End: 2018-11-16 | Stop reason: HOSPADM

## 2018-11-16 RX ADMIN — CLOPIDOGREL 300 MG: 75 TABLET, FILM COATED ORAL at 11:51

## 2018-11-16 NOTE — DISCHARGE INSTR - ACTIVITY
-NO heavy lifting anything over 5 pounds    -NO pushing or pulling    -NO vigorous activity or straining    -If it is necessary to use the stairs take them slowly    -When coughing, sneezing, or straining support your groin by pressing with your palm on top of the bandage.    -NO strenuous  Activity    -NO driving for atleast 24 hours    -NO tub bath or jacuzzi tubs for the next 5-7 days  SHOWER ONLY    -Keep the current bandage on the site for 24 hours.  Remove the bandage to shower.  Once shower is done apply a Band-aid to site and change 1 to 2 times daily until healed.    -NO lotions, powders, or ointments to site.  Keep clean with soap and water.

## 2018-11-16 NOTE — NURSING NOTE
Dr. Manriquez at patient's bedside to speak with patient regarding procedure/ plan.  Questions answered.

## 2018-12-07 ENCOUNTER — OFFICE VISIT (OUTPATIENT)
Dept: CARDIOLOGY | Facility: CLINIC | Age: 53
End: 2018-12-07

## 2018-12-07 ENCOUNTER — TELEPHONE (OUTPATIENT)
Dept: CARDIOLOGY | Facility: CLINIC | Age: 53
End: 2018-12-07

## 2018-12-07 VITALS
HEIGHT: 73 IN | WEIGHT: 263 LBS | DIASTOLIC BLOOD PRESSURE: 91 MMHG | OXYGEN SATURATION: 98 % | SYSTOLIC BLOOD PRESSURE: 144 MMHG | HEART RATE: 91 BPM | BODY MASS INDEX: 34.85 KG/M2

## 2018-12-07 DIAGNOSIS — Z72.0 TOBACCO ABUSE: ICD-10-CM

## 2018-12-07 DIAGNOSIS — I10 ESSENTIAL HYPERTENSION: ICD-10-CM

## 2018-12-07 DIAGNOSIS — I73.9 PVD (PERIPHERAL VASCULAR DISEASE) (HCC): Primary | ICD-10-CM

## 2018-12-07 DIAGNOSIS — I73.9 PERIPHERAL ARTERIAL DISEASE (HCC): ICD-10-CM

## 2018-12-07 DIAGNOSIS — I25.10 CORONARY ARTERY DISEASE INVOLVING NATIVE CORONARY ARTERY OF NATIVE HEART WITHOUT ANGINA PECTORIS: ICD-10-CM

## 2018-12-07 DIAGNOSIS — I70.422 ATHEROSCLEROSIS OF AUTOLOGOUS VEIN BYPASS GRAFT OF LEFT LOWER EXTREMITY WITH REST PAIN (HCC): ICD-10-CM

## 2018-12-07 PROCEDURE — 99213 OFFICE O/P EST LOW 20 MIN: CPT | Performed by: NURSE PRACTITIONER

## 2018-12-07 RX ORDER — AMLODIPINE BESYLATE 10 MG/1
10 TABLET ORAL EVERY MORNING
Qty: 90 TABLET | Refills: 0 | Status: SHIPPED | OUTPATIENT
Start: 2018-12-07 | End: 2019-02-12 | Stop reason: SINTOL

## 2018-12-07 RX ORDER — VARENICLINE TARTRATE 1 MG/1
1 TABLET, FILM COATED ORAL 2 TIMES DAILY
Qty: 60 TABLET | Refills: 2 | Status: SHIPPED | OUTPATIENT
Start: 2018-12-07 | End: 2019-05-13 | Stop reason: ALTCHOICE

## 2018-12-07 NOTE — TELEPHONE ENCOUNTER
Pt requesting RF for amlodipine 10 mg, 1 tab po daily. L/S Cassi 11/12/18, Amlodipine was continued.  Next ov 2/12/18.  Will refill for pt.

## 2019-01-02 DIAGNOSIS — I73.9 PVD (PERIPHERAL VASCULAR DISEASE) (HCC): Primary | ICD-10-CM

## 2019-01-02 DIAGNOSIS — I70.422 ATHEROSCLEROSIS OF AUTOLOGOUS VEIN BYPASS GRAFT OF LEFT LOWER EXTREMITY WITH REST PAIN (HCC): ICD-10-CM

## 2019-01-02 DIAGNOSIS — I73.9 PERIPHERAL ARTERIAL DISEASE (HCC): ICD-10-CM

## 2019-01-02 DIAGNOSIS — I73.9 PVD (PERIPHERAL VASCULAR DISEASE) (HCC): ICD-10-CM

## 2019-01-03 ENCOUNTER — HOSPITAL ENCOUNTER (OUTPATIENT)
Facility: HOSPITAL | Age: 54
Discharge: HOME | End: 2019-01-03
Attending: INTERNAL MEDICINE | Admitting: INTERNAL MEDICINE

## 2019-01-03 VITALS
HEART RATE: 87 BPM | OXYGEN SATURATION: 95 % | WEIGHT: 262.35 LBS | DIASTOLIC BLOOD PRESSURE: 96 MMHG | SYSTOLIC BLOOD PRESSURE: 140 MMHG | HEIGHT: 73 IN | BODY MASS INDEX: 34.77 KG/M2 | RESPIRATION RATE: 17 BRPM | TEMPERATURE: 97.7 F

## 2019-01-03 DIAGNOSIS — I10 ESSENTIAL HYPERTENSION: ICD-10-CM

## 2019-01-03 DIAGNOSIS — E78.5 HYPERLIPIDEMIA, UNSPECIFIED HYPERLIPIDEMIA TYPE: ICD-10-CM

## 2019-01-03 DIAGNOSIS — R89.9 ABNORMAL LABORATORY TEST: ICD-10-CM

## 2019-01-03 DIAGNOSIS — R79.9 ABNORMAL FINDING OF BLOOD CHEMISTRY: ICD-10-CM

## 2019-01-03 DIAGNOSIS — I73.9 PVD (PERIPHERAL VASCULAR DISEASE) (HCC): ICD-10-CM

## 2019-01-03 DIAGNOSIS — E78.1 HIGH TRIGLYCERIDES: ICD-10-CM

## 2019-01-03 LAB
ALBUMIN SERPL-MCNC: 4.6 G/DL (ref 3.5–5)
ALBUMIN/GLOB SERPL: 1.7 G/DL (ref 1.5–2.5)
ALP SERPL-CCNC: 77 U/L (ref 40–129)
ALT SERPL W P-5'-P-CCNC: 52 U/L (ref 10–44)
ANION GAP SERPL CALCULATED.3IONS-SCNC: 2.3 MMOL/L (ref 3.6–11.2)
AST SERPL-CCNC: 32 U/L (ref 10–34)
BASOPHILS # BLD AUTO: 0.13 10*3/MM3 (ref 0–0.3)
BASOPHILS NFR BLD AUTO: 1.6 % (ref 0–2)
BILIRUB SERPL-MCNC: 0.3 MG/DL (ref 0.2–1.8)
BUN BLD-MCNC: 22 MG/DL (ref 7–21)
BUN/CREAT SERPL: 22.4 (ref 7–25)
CALCIUM SPEC-SCNC: 9.1 MG/DL (ref 7.7–10)
CHLORIDE SERPL-SCNC: 107 MMOL/L (ref 99–112)
CHOLEST SERPL-MCNC: 212 MG/DL (ref 0–200)
CO2 SERPL-SCNC: 29.7 MMOL/L (ref 24.3–31.9)
CREAT BLD-MCNC: 0.98 MG/DL (ref 0.43–1.29)
DEPRECATED RDW RBC AUTO: 40.6 FL (ref 37–54)
EOSINOPHIL # BLD AUTO: 0.5 10*3/MM3 (ref 0–0.7)
EOSINOPHIL NFR BLD AUTO: 6.2 % (ref 0–5)
ERYTHROCYTE [DISTWIDTH] IN BLOOD BY AUTOMATED COUNT: 12 % (ref 11.5–14.5)
GFR SERPL CREATININE-BSD FRML MDRD: 80 ML/MIN/1.73
GLOBULIN UR ELPH-MCNC: 2.7 GM/DL
GLUCOSE BLD-MCNC: 111 MG/DL (ref 70–110)
HBA1C MFR BLD: 5.9 % (ref 4.5–5.7)
HCT VFR BLD AUTO: 46.6 % (ref 42–52)
HDLC SERPL-MCNC: 39 MG/DL (ref 60–100)
HGB BLD-MCNC: 16.4 G/DL (ref 14–18)
IMM GRANULOCYTES # BLD AUTO: 0.04 10*3/MM3 (ref 0–0.03)
IMM GRANULOCYTES NFR BLD AUTO: 0.5 % (ref 0–0.5)
LDLC SERPL CALC-MCNC: 106 MG/DL (ref 0–100)
LDLC/HDLC SERPL: 2.72 {RATIO}
LYMPHOCYTES # BLD AUTO: 2.6 10*3/MM3 (ref 1–3)
LYMPHOCYTES NFR BLD AUTO: 32.2 % (ref 21–51)
MCH RBC QN AUTO: 32.9 PG (ref 27–33)
MCHC RBC AUTO-ENTMCNC: 35.2 G/DL (ref 33–37)
MCV RBC AUTO: 93.6 FL (ref 80–94)
MONOCYTES # BLD AUTO: 0.93 10*3/MM3 (ref 0.1–0.9)
MONOCYTES NFR BLD AUTO: 11.5 % (ref 0–10)
NEUTROPHILS # BLD AUTO: 3.87 10*3/MM3 (ref 1.4–6.5)
NEUTROPHILS NFR BLD AUTO: 48 % (ref 30–70)
OSMOLALITY SERPL CALC.SUM OF ELEC: 281.6 MOSM/KG (ref 273–305)
PLATELET # BLD AUTO: 331 10*3/MM3 (ref 130–400)
PMV BLD AUTO: 10.4 FL (ref 6–10)
POTASSIUM BLD-SCNC: 4.6 MMOL/L (ref 3.5–5.3)
PROT SERPL-MCNC: 7.3 G/DL (ref 6–8)
RBC # BLD AUTO: 4.98 10*6/MM3 (ref 4.7–6.1)
SODIUM BLD-SCNC: 139 MMOL/L (ref 135–153)
TRIGL SERPL-MCNC: 335 MG/DL (ref 0–150)
VLDLC SERPL-MCNC: 67 MG/DL
WBC NRBC COR # BLD: 8.07 10*3/MM3 (ref 4.5–12.5)

## 2019-01-03 PROCEDURE — 80053 COMPREHEN METABOLIC PANEL: CPT | Performed by: PHYSICIAN ASSISTANT

## 2019-01-03 PROCEDURE — 75716 ARTERY X-RAYS ARMS/LEGS: CPT | Performed by: INTERNAL MEDICINE

## 2019-01-03 PROCEDURE — A9270 NON-COVERED ITEM OR SERVICE: HCPCS | Performed by: INTERNAL MEDICINE

## 2019-01-03 PROCEDURE — 25010000002 HEPARIN (PORCINE) PER 1000 UNITS: Performed by: INTERNAL MEDICINE

## 2019-01-03 PROCEDURE — C1769 GUIDE WIRE: HCPCS | Performed by: INTERNAL MEDICINE

## 2019-01-03 PROCEDURE — C1714 CATH, TRANS ATHERECTOMY, DIR: HCPCS | Performed by: INTERNAL MEDICINE

## 2019-01-03 PROCEDURE — C1894 INTRO/SHEATH, NON-LASER: HCPCS | Performed by: INTERNAL MEDICINE

## 2019-01-03 PROCEDURE — 85025 COMPLETE CBC W/AUTO DIFF WBC: CPT | Performed by: INTERNAL MEDICINE

## 2019-01-03 PROCEDURE — 25010000002 IOPAMIDOL 61 % SOLUTION: Performed by: INTERNAL MEDICINE

## 2019-01-03 PROCEDURE — C1760 CLOSURE DEV, VASC: HCPCS | Performed by: INTERNAL MEDICINE

## 2019-01-03 PROCEDURE — 83036 HEMOGLOBIN GLYCOSYLATED A1C: CPT | Performed by: PHYSICIAN ASSISTANT

## 2019-01-03 PROCEDURE — 25010000002 MIDAZOLAM PER 1 MG: Performed by: INTERNAL MEDICINE

## 2019-01-03 PROCEDURE — 80061 LIPID PANEL: CPT | Performed by: PHYSICIAN ASSISTANT

## 2019-01-03 PROCEDURE — 37225 PR REVSC OPN/PRQ FEM/POP W/ATHRC/ANGIOP SM VSL: CPT | Performed by: INTERNAL MEDICINE

## 2019-01-03 PROCEDURE — C1751 CATH, INF, PER/CENT/MIDLINE: HCPCS | Performed by: INTERNAL MEDICINE

## 2019-01-03 PROCEDURE — 63710000001 CLOPIDOGREL 75 MG TABLET: Performed by: INTERNAL MEDICINE

## 2019-01-03 PROCEDURE — C2623 CATH, TRANSLUMIN, DRUG-COAT: HCPCS | Performed by: INTERNAL MEDICINE

## 2019-01-03 PROCEDURE — 25010000002 FENTANYL CITRATE (PF) 100 MCG/2ML SOLUTION: Performed by: INTERNAL MEDICINE

## 2019-01-03 RX ORDER — CLOPIDOGREL BISULFATE 75 MG/1
TABLET ORAL AS NEEDED
Status: DISCONTINUED | OUTPATIENT
Start: 2019-01-03 | End: 2019-01-03 | Stop reason: HOSPADM

## 2019-01-03 RX ORDER — SODIUM CHLORIDE 9 MG/ML
INJECTION, SOLUTION INTRAVENOUS CONTINUOUS PRN
Status: COMPLETED | OUTPATIENT
Start: 2019-01-03 | End: 2019-01-03

## 2019-01-03 RX ORDER — LIDOCAINE HYDROCHLORIDE 20 MG/ML
INJECTION, SOLUTION INFILTRATION; PERINEURAL AS NEEDED
Status: DISCONTINUED | OUTPATIENT
Start: 2019-01-03 | End: 2019-01-03 | Stop reason: HOSPADM

## 2019-01-03 RX ORDER — FENTANYL CITRATE 50 UG/ML
INJECTION, SOLUTION INTRAMUSCULAR; INTRAVENOUS AS NEEDED
Status: DISCONTINUED | OUTPATIENT
Start: 2019-01-03 | End: 2019-01-03 | Stop reason: HOSPADM

## 2019-01-03 RX ORDER — MIDAZOLAM HYDROCHLORIDE 1 MG/ML
INJECTION INTRAMUSCULAR; INTRAVENOUS AS NEEDED
Status: DISCONTINUED | OUTPATIENT
Start: 2019-01-03 | End: 2019-01-03 | Stop reason: HOSPADM

## 2019-01-03 RX ORDER — HEPARIN SODIUM 1000 [USP'U]/ML
INJECTION, SOLUTION INTRAVENOUS; SUBCUTANEOUS AS NEEDED
Status: DISCONTINUED | OUTPATIENT
Start: 2019-01-03 | End: 2019-01-03 | Stop reason: HOSPADM

## 2019-01-03 NOTE — DISCHARGE INSTR - ACTIVITY
No driving for 24hours   No tub baths/ Hot tubs, No lotions/powders/ointments for 3-5 days( Or until site heals)   No heavy lifting, bending at waist, vigorous activity, climbing stairs for the next few days.

## 2019-01-04 RX ORDER — SODIUM CHLORIDE 9 MG/ML
100 INJECTION, SOLUTION INTRAVENOUS ONCE
Status: DISCONTINUED | OUTPATIENT
Start: 2019-01-04 | End: 2019-01-04 | Stop reason: HOSPADM

## 2019-01-04 RX ORDER — ONDANSETRON 2 MG/ML
4 INJECTION INTRAMUSCULAR; INTRAVENOUS EVERY 6 HOURS PRN
Status: DISCONTINUED | OUTPATIENT
Start: 2019-01-04 | End: 2019-01-04 | Stop reason: HOSPADM

## 2019-01-04 RX ORDER — CLOPIDOGREL BISULFATE 75 MG/1
75 TABLET ORAL DAILY
Status: DISCONTINUED | OUTPATIENT
Start: 2019-01-04 | End: 2019-01-04 | Stop reason: HOSPADM

## 2019-01-04 RX ORDER — ASPIRIN 81 MG/1
81 TABLET ORAL DAILY
Status: DISCONTINUED | OUTPATIENT
Start: 2019-01-04 | End: 2019-01-04 | Stop reason: HOSPADM

## 2019-01-17 ENCOUNTER — OFFICE VISIT (OUTPATIENT)
Dept: CARDIOLOGY | Facility: CLINIC | Age: 54
End: 2019-01-17

## 2019-01-17 VITALS
SYSTOLIC BLOOD PRESSURE: 144 MMHG | WEIGHT: 269.8 LBS | HEIGHT: 72 IN | HEART RATE: 86 BPM | BODY MASS INDEX: 36.54 KG/M2 | OXYGEN SATURATION: 99 % | DIASTOLIC BLOOD PRESSURE: 93 MMHG

## 2019-01-17 DIAGNOSIS — I73.9 PAD (PERIPHERAL ARTERY DISEASE) (HCC): ICD-10-CM

## 2019-01-17 DIAGNOSIS — R06.09 DYSPNEA ON EXERTION: ICD-10-CM

## 2019-01-17 DIAGNOSIS — I73.9 CLAUDICATION IN PERIPHERAL VASCULAR DISEASE (HCC): Primary | ICD-10-CM

## 2019-01-17 PROCEDURE — 99214 OFFICE O/P EST MOD 30 MIN: CPT | Performed by: INTERNAL MEDICINE

## 2019-01-17 RX ORDER — CLOPIDOGREL BISULFATE 75 MG/1
75 TABLET ORAL DAILY
Qty: 30 TABLET | Refills: 11 | Status: SHIPPED | OUTPATIENT
Start: 2019-01-17 | End: 2020-01-23 | Stop reason: SDUPTHER

## 2019-01-17 RX ORDER — PHENTERMINE HYDROCHLORIDE 37.5 MG/1
37.5 TABLET ORAL DAILY
Refills: 0 | COMMUNITY
Start: 2019-01-14 | End: 2019-05-13 | Stop reason: ALTCHOICE

## 2019-01-17 NOTE — PROGRESS NOTES
[facility] CARDIOLOGY  [address] [facility] [address]  [place] [address]  Phone: [phone]  Fax: [phone]    01/17/2019    Chief Complaint   Patient presents with   • Peripheral Vascular Disease        History:   Carlos Carson is a 53 y.o. male seen in follow-up from the left arteriogram. Patient states he is doing well and is able to walk more without his leg cramping up. He also states that he is able to tell when his feet get cold. He is now trying to walk more at [facility]. He also reports a 14 lb weight gain. He also has reported dyspnea with exertion. Denies CP.    Past Medical History:   Diagnosis Date   • Hyperlipidemia    • Hypertension    • PAD (peripheral artery disease) (CMS/[facility])        Past Surgical History:   Procedure Laterality Date   • ARTERIOGRAM N/A 11/16/2018    Procedure: Right Arteriogram;  Surgeon: Clark Manriquez MD;  Location: [facility] CATH INVASIVE LOCATION;  Service: Cardiology   • ARTERIOGRAM N/A 1/3/2019    Procedure: Arteriogram-- LEFT LOWER EXTREMITY;  Surgeon: Clark Manriquez MD;  Location: [facility] CATH INVASIVE LOCATION;  Service: Cardiology   • CARDIAC CATHETERIZATION  2011   • CARDIAC CATHETERIZATION N/A 11/16/2018    Procedure: Atherectomy-peripheral;  Surgeon: Clark Manriquez MD;  Location: [facility] CATH INVASIVE LOCATION;  Service: Cardiology        Past Social History:  Social History     Socioeconomic History   • Marital status: Unknown     Spouse name: Not on file   • Number of children: Not on file   • Years of education: Not on file   • Highest education level: Not on file   Tobacco Use   • Smoking status: Current Every Day Smoker     Packs/day: 1.00     Years: 46.00     Pack years: 46.00     Types: Cigarettes   • Smokeless tobacco: Never Used   Substance and Sexual Activity   • Alcohol use: Yes     Alcohol/week: 3.6 oz     Types: 6 Cans of beer per week   • Drug use: No   • Sexual activity: Defer       Past Family History:  Family History   Problem  Relation Age of Onset   • Hypertension Mother    • Heart disease Father 65        CABG x3v 99% blockage   • No Known Problems Sister    • No Known Problems Brother        Review of Systems:   Please see HPI  Constitution: No chills, no rigors, no unexplained weight loss or weight gain  Eyes:  No diplopia, no blurred vision, no loss of vision, conjunctiva is pink and sclera is anicteric  ENT:  No tinnitus, no otorrhea, no epistaxis, no sore throat   Respiratory: No cough, no hemoptysis  Cardiovascular: see HPI  Gastrointestinal: No nausea, no vomiting, no hematemesis, no diarrhea or constipation, no melena  Genitourinary: No frequency of dysuria no hematuria  Integument: No pruritis and  no skin rash  Hematologic / Lymphatic: No excessive bleeding, easy bruising, fatigue, lymphadenopathy and petechiae  Musculoskeletal: No joint pain, joint stiffness, joint swelling, muscle pain, muscle weakness and neck pain  Neurological: No dizziness, headaches, light headedness, seizures and vertigo  Endocrine: No frequent urination and nocturia, temperature intolerance, weight gain, unintended and weight loss, unintended      Current Outpatient Medications   Medication Sig Dispense Refill   • amLODIPine (NORVASC) 10 MG tablet Take 1 tablet by mouth Every Morning. 90 tablet 0   • aspirin 81 MG chewable tablet Chew 81 mg Daily.     • fenofibrate (TRICOR) 145 MG tablet 145 mg by mouth daily take 1 tablet daily 30 tablet 6   • gabapentin (NEURONTIN) 800 MG tablet Take 800 mg by mouth 4 (Four) Times a Day.  0   • HYDROcodone-acetaminophen (NORCO) 7.5-325 MG per tablet Take 7.5 tablets by mouth 2 (Two) Times a Day.  0   • ibuprofen (ADVIL,MOTRIN) 800 MG tablet Take 800 mg by mouth 3 (Three) Times a Day.  2   • metoprolol succinate XL (TOPROL-XL) 25 MG 24 hr tablet Take 1 tablet by mouth Daily. 90 tablet 3   • phentermine (ADIPEX-P) 37.5 MG tablet Take 37.5 mg by mouth Daily.  0   • varenicline (CHANTIX CONTINUING MONTH GENESIS) 1 MG tablet  Take 1 tablet by mouth 2 (Two) Times a Day. 60 tablet 2   • Omega-3 Fatty Acids (FISH OIL) 1200 MG capsule capsule Take 1,200 mg by mouth Daily.       No current facility-administered medications for this visit.         Allergies   Allergen Reactions   • Lisinopril Cough       Objective:  Vitals:    01/17/19 1433   BP: 144/93   Pulse: 86   SpO2: 99%     Comfortable NAD  PERRL, conjunctiva clear  Neck supple, no JVD or thyromegaly appreciated  S1/S2 RRR, no m/r/g  Lungs CTA B, normal effort  Abdomen S/NT/ND (+) BS, no HSM appreciated  Extremities warm, no clubbing, cyanosis, or edema  Normal gait  No visible or palpable skin lesions  A/Ox4, mood and affect appropriate  Pulse exam:    Feet are warm bilateral  Capillary refill is normal  No evidence of ulceration or color change of the toes  PULSES  Right DP and PT are 1+ and Left DP and PT are 1+    DATA:      Results for orders placed in visit on 11/14/16   SCANNED - ECHOCARDIOGRAM          Results for orders placed during the hospital encounter of 01/03/19   Arteriogram    Narrative PERIPHERAL ARTERIOGRAM / INTERVENTION REPORT     DATE OF PROCEDURE: 01/03/19     INDICATION FOR PROCEDURE: Severe claudication of the left leg. Previous   atherectomy of the right SFA in Nov 2018.         PROCEDURE PERFORMED:   Right femoral artery access with 5 Albanian sheath  Limited bilateral lower extremity runoff  Atherectomy and Drug Coated balloon of the left SFA       PROCEDURE COMMENTS:  Carlos Carson was brought to the cath lab and placed on the cardiac   catherization table in the postabsortive state. Time out was taken and   patient received moderate sedation. The patient was prepped and draped   according to the cath lab protocol under strict aseptic and sterile   condition. Patient's right femoral artery sight was prepped and draped.   Under fluoroscopic guidance theright femoral artery was punctured using a   micropuncture needle utilizing the modified Seldinger technique. 5  English   sheath was introduced over a wire. After aspirating for blood it was   flushed with heparinized saline.    Right extremity imaging was performed via the sheath. A 4F RIM catheter   was then advanced to the abdominal aorta at the level of L1 and aortogram   was performed. Catheter was then pulled back at the bifurcation at L5 and   engaged in the contralateral MAAME. Left extremity imaging was then   performed. After the procedure was completed the sheath was removed in the   cath lab and hemostasis achieved via manual compression. No complications   occurred. Patient tolerated the procedure well.     Findings:    Right Lower Extremity:   Common  Iliac artery shows a 50-60% stenosis with J-wire hanging up in   this lesion  SFA shows previous atherectomy site wide open with good flow  Popliteal Artery was patent with no disease  Tibio-peroneal trunk patent with no disease  Anterior tibial artery was 100% occluded  Posterior tibial artery was patent  2 Vessel run off present    Left Lower Extremity:  Common iliac artery shows mild to moderate disease and calcific changes  SFA shows prox and distal third with up to 80% stenosis  Tibio-peroneal trunk patent with moderate disease  Anterior tibial artery was initially showing poor filling but was seen to   fill after the intervention on SFA  Posterior tibial artery is patent with no disease  2 Vessel run off present    EBL: 50 ML  No specimens were removed.    INTERVENTION DETAILS:  A 7x45 destination sheath placed  A Command wire 0.014 was used to cross lesions and placed BTP  Atherectomy was performed to both the prox and distal lesions and large   amount of plaque removed  DCB with Admiral In-Pact 6x80 used    Final Result:  Successful Atherectomy and balloon angioplasty with DCB of the prox and   distal SFA using  SAFE ID SolutionskOne 7F device   Admiral DCB 6x80  With stenosis reduction from 80% to 30% residual.    Plan  Asa and Plavix for 6 months  Pt will need  work up for his new onset Dyspnea  He is OK to proceed with Diet control meds.    Clark Manriquez MD       Procedures       A/P:  Severe PAD bilateral with claudication  Left SFA atherectomy and DCB  Right SFA atherectomy and DCB  Stable essential hypertension  New onset Dyspnea      Plan  Begin Plavix for 6 months  Advised diet and exercise  He is now diet pills per his family physician  FU in 3 months and will plan a lexiscan stress test at that time    Patient's Body mass index is 36.59 kg/m². BMI is above normal parameters. Recommendations include: exercise counseling.       No diagnosis found.     Thank you for allowing me to participate in the care of Carlos Carson. Feel free to contact me directly with any further questions or concerns.

## 2019-02-12 ENCOUNTER — OFFICE VISIT (OUTPATIENT)
Dept: CARDIOLOGY | Facility: CLINIC | Age: 54
End: 2019-02-12

## 2019-02-12 VITALS
WEIGHT: 272 LBS | HEIGHT: 73 IN | HEART RATE: 86 BPM | DIASTOLIC BLOOD PRESSURE: 97 MMHG | RESPIRATION RATE: 16 BRPM | BODY MASS INDEX: 36.05 KG/M2 | SYSTOLIC BLOOD PRESSURE: 145 MMHG

## 2019-02-12 DIAGNOSIS — I25.10 ASCVD (ARTERIOSCLEROTIC CARDIOVASCULAR DISEASE): ICD-10-CM

## 2019-02-12 DIAGNOSIS — E78.5 DYSLIPIDEMIA: ICD-10-CM

## 2019-02-12 DIAGNOSIS — R07.2 PRECORDIAL PAIN: ICD-10-CM

## 2019-02-12 DIAGNOSIS — I73.9 PVD (PERIPHERAL VASCULAR DISEASE) (HCC): ICD-10-CM

## 2019-02-12 DIAGNOSIS — I10 ESSENTIAL HYPERTENSION: ICD-10-CM

## 2019-02-12 DIAGNOSIS — I10 ESSENTIAL HYPERTENSION: Primary | ICD-10-CM

## 2019-02-12 PROCEDURE — 99214 OFFICE O/P EST MOD 30 MIN: CPT | Performed by: NURSE PRACTITIONER

## 2019-02-12 RX ORDER — ROSUVASTATIN CALCIUM 10 MG/1
10 TABLET, COATED ORAL DAILY
Qty: 30 TABLET | Refills: 5 | Status: SHIPPED | OUTPATIENT
Start: 2019-02-12 | End: 2019-08-23 | Stop reason: SDUPTHER

## 2019-02-12 RX ORDER — METOPROLOL SUCCINATE 50 MG/1
50 TABLET, EXTENDED RELEASE ORAL DAILY
Qty: 30 TABLET | Refills: 5 | Status: SHIPPED | OUTPATIENT
Start: 2019-02-12 | End: 2019-08-26 | Stop reason: SDUPTHER

## 2019-02-12 RX ORDER — AMLODIPINE BESYLATE 10 MG/1
10 TABLET ORAL EVERY MORNING
Qty: 90 TABLET | Refills: 0
Start: 2019-02-12 | End: 2019-03-14 | Stop reason: SDUPTHER

## 2019-02-12 NOTE — PROGRESS NOTES
Marta Davis, APRN  Carlos Carson  1965 02/12/2019    Patient Active Problem List   Diagnosis   • Essential hypertension   • Coronary artery disease without angina pectoris, non-hemodynamically significant   • PVD (peripheral vascular disease) (CMS/[facility])   • Dyslipidemia   • Tobacco abuse   • Simple chronic bronchitis (CMS/HCC)   • Obesity (BMI 30-39.9)   • Cough due to ACE inhibitor (Lisinopril)   • Chronic bronchitis (CMS/HCC)   • ASCVD (arteriosclerotic cardiovascular disease), apparently nonobstructive, clinically stable.   • Peripheral arterial disease, involving both lower extremities with bilateral leg claudication.   • Chronic fatigue   • PAD (peripheral artery disease) (CMS/[facility])       Dear Marta Davis, APRN:    Subjective     Chief Complaint   Patient presents with   • Follow-up     3 mos   • Shortness of Breath     unchanged   • Post-op Peripheral Arterial Bypass     recent intervention, bilateral LE   • Med Management     list provided           History of Present Illness:    Carlos Carson is a 53 y.o. male with a history of nonobstructive coronary artery disease, dyslipidemia, hypertension, severe peripheral arterial disease status post arteriogram and intervention to bilateral lower extremities November 2018 and then again in January 2019 by Dr. Clark Manriquez, COPD, and tobacco abuse.  He presents today for routine cardiology follow-up.  Reports he is very pleased with recent procedures to bilateral lower extremities.  He is no longer having any leg pain or cramping.  He does report chronic intermittent left-sided chest pains which seem to occur in relation to exertion and alleviated with rest. He describes this as a mild dull pain in the left chest wall. This does not radiate and has no associated symptoms. He has also had exertional dyspnea with moderate exertion.  He reports a cardiac catheterization several years ago which showed 70% stenosis in one of his arteries.  Further  details are not available.  His blood pressure is elevated in the office today.  He does not monitor routinely at home.  He denies palpitations, dizziness, lightheadedness, near sick, and syncope.  He has been taking Chantix and has reduced his cigarette consumption by half.  He also reports he has stopped taking atorvastatin as this caused the pain.          Allergies   Allergen Reactions   • Lisinopril Cough   :      Current Outpatient Medications:   •  amLODIPine (NORVASC) 10 MG tablet, Take 1 tablet by mouth Every Morning., Disp: 90 tablet, Rfl: 0  •  aspirin 81 MG chewable tablet, Chew 81 mg Daily., Disp: , Rfl:   •  clopidogrel (PLAVIX) 75 MG tablet, Take 1 tablet by mouth Daily., Disp: 30 tablet, Rfl: 11  •  fenofibrate (TRICOR) 145 MG tablet, 145 mg by mouth daily take 1 tablet daily, Disp: 30 tablet, Rfl: 6  •  gabapentin (NEURONTIN) 800 MG tablet, Take 800 mg by mouth 3 (Three) Times a Day., Disp: , Rfl: 0  •  HYDROcodone-acetaminophen (NORCO) 7.5-325 MG per tablet, Take 7.5 tablets by mouth 2 (Two) Times a Day., Disp: , Rfl: 0  •  ibuprofen (ADVIL,MOTRIN) 800 MG tablet, Take 800 mg by mouth 3 (Three) Times a Day., Disp: , Rfl: 2  •  metoprolol succinate XL (TOPROL-XL) 50 MG 24 hr tablet, Take 1 tablet by mouth Daily., Disp: 30 tablet, Rfl: 5  •  phentermine (ADIPEX-P) 37.5 MG tablet, Take 37.5 mg by mouth Daily., Disp: , Rfl: 0  •  varenicline (CHANTIX CONTINUING MONTH GENESIS) 1 MG tablet, Take 1 tablet by mouth 2 (Two) Times a Day., Disp: 60 tablet, Rfl: 2  •  rosuvastatin (CRESTOR) 10 MG tablet, Take 1 tablet by mouth Daily., Disp: 30 tablet, Rfl: 5      The following portions of the patient's history were reviewed and updated as appropriate: allergies, current medications, past family history, past medical history, past social history, past surgical history and problem list.    Social History     Tobacco Use   • Smoking status: Current Every Day Smoker     Packs/day: 1.00     Years: 46.00     Pack years:  "46.00     Types: Cigarettes   • Smokeless tobacco: Never Used   Substance Use Topics   • Alcohol use: Yes     Alcohol/week: 3.6 oz     Types: 6 Cans of beer per week   • Drug use: No       Review of Systems   Constitution: Negative for weakness and malaise/fatigue.   Cardiovascular: Positive for chest pain and dyspnea on exertion. Negative for leg swelling, palpitations and syncope.   Respiratory: Positive for shortness of breath. Negative for cough and wheezing.    Neurological: Negative for dizziness and light-headedness.       Objective   Vitals:    02/12/19 0851   BP: 145/97   Pulse: 86   Resp: 16   Weight: 123 kg (272 lb)   Height: 185.4 cm (73\")     Body mass index is 35.89 kg/m².        Physical Exam   Constitutional: He is oriented to person, place, and time. He appears well-developed and well-nourished.   HENT:   Head: Normocephalic and atraumatic.   Cardiovascular: Normal rate, regular rhythm and normal heart sounds. Exam reveals no S3 and no S4.   No murmur heard.  Pulmonary/Chest: Effort normal and breath sounds normal. He has no wheezes. He has no rales.   Abdominal: Soft. Bowel sounds are normal.   Musculoskeletal: He exhibits no edema.   Neurological: He is alert and oriented to person, place, and time.   Skin: Skin is warm and dry.   Psychiatric: He has a normal mood and affect. His behavior is normal.       Lab Results   Component Value Date     01/03/2019    K 4.6 01/03/2019     01/03/2019    CO2 29.7 01/03/2019    BUN 22 (H) 01/03/2019    CREATININE 0.98 01/03/2019    GLUCOSE 111 (H) 01/03/2019    CALCIUM 9.1 01/03/2019    AST 32 01/03/2019    ALT 52 (H) 01/03/2019    ALKPHOS 77 01/03/2019        Lab Results   Component Value Date    WBC 8.07 01/03/2019    HGB 16.4 01/03/2019    HCT 46.6 01/03/2019     01/03/2019        Lab Results   Component Value Date    TSH 2.129 02/28/2018    TRIG 335 (H) 01/03/2019    HDL 39 (L) 01/03/2019     (H) 01/03/2019       "       Procedures      Assessment/Plan    Diagnosis Plan   1. Essential hypertension  metoprolol succinate XL (TOPROL-XL) 50 MG 24 hr tablet    Comprehensive Metabolic Panel    Lipid Panel   2. PVD (peripheral vascular disease) (CMS/HCC)  Comprehensive Metabolic Panel    Lipid Panel   3. ASCVD (arteriosclerotic cardiovascular disease), apparently nonobstructive, clinically stable.  metoprolol succinate XL (TOPROL-XL) 50 MG 24 hr tablet    Comprehensive Metabolic Panel    Lipid Panel    Stress Test With Myocardial Perfusion   4. Dyslipidemia  rosuvastatin (CRESTOR) 10 MG tablet    Comprehensive Metabolic Panel    Lipid Panel   5. Essential hypertension, uncontrolled  amLODIPine (NORVASC) 10 MG tablet   6. Precordial pain  Stress Test With Myocardial Perfusion                Recommendations:    1.  For his uncontrolled blood pressure, will increase metoprolol succinate to 50 mg daily.  Advised him to continue to monitor home.  Blood pressure parameters discussed.    2.  Will start Crestor 10 mg daily.    3.  CMP and FLP in 3 months.    4.  For his chest pains, will order Lexiscan sestamibi study.    5.  Follow-up with interventional cardiology as scheduled.    6.  Follow-up in 3 months and as needed.       Return in about 3 months (around 5/12/2019) for Recheck.    As always, I appreciate very much the opportunity to participate in the cardiovascular care of your patients.      With Best Regards,    ENID Davenport

## 2019-03-12 ENCOUNTER — HOSPITAL ENCOUNTER (OUTPATIENT)
Dept: NUCLEAR MEDICINE | Facility: HOSPITAL | Age: 54
Discharge: HOME | End: 2019-03-12

## 2019-03-12 ENCOUNTER — LAB (OUTPATIENT)
Dept: LAB | Facility: HOSPITAL | Age: 54
End: 2019-03-12

## 2019-03-12 ENCOUNTER — HOSPITAL ENCOUNTER (OUTPATIENT)
Dept: NUCLEAR MEDICINE | Facility: HOSPITAL | Age: 54
End: 2019-03-12

## 2019-03-12 ENCOUNTER — HOSPITAL ENCOUNTER (OUTPATIENT)
Dept: CARDIOLOGY | Facility: HOSPITAL | Age: 54
End: 2019-03-12

## 2019-03-12 DIAGNOSIS — I25.10 ASCVD (ARTERIOSCLEROTIC CARDIOVASCULAR DISEASE): ICD-10-CM

## 2019-03-12 DIAGNOSIS — I10 ESSENTIAL HYPERTENSION: ICD-10-CM

## 2019-03-12 DIAGNOSIS — E78.5 DYSLIPIDEMIA: ICD-10-CM

## 2019-03-12 DIAGNOSIS — I73.9 PVD (PERIPHERAL VASCULAR DISEASE) (HCC): ICD-10-CM

## 2019-03-12 DIAGNOSIS — R07.2 PRECORDIAL PAIN: ICD-10-CM

## 2019-03-14 ENCOUNTER — TELEPHONE (OUTPATIENT)
Dept: CARDIOLOGY | Facility: CLINIC | Age: 54
End: 2019-03-14

## 2019-03-14 DIAGNOSIS — I10 ESSENTIAL HYPERTENSION: ICD-10-CM

## 2019-03-14 RX ORDER — AMLODIPINE BESYLATE 10 MG/1
10 TABLET ORAL EVERY MORNING
Qty: 90 TABLET | Refills: 0
Start: 2019-03-14 | End: 2019-03-14 | Stop reason: SDUPTHER

## 2019-03-14 RX ORDER — AMLODIPINE BESYLATE 10 MG/1
10 TABLET ORAL EVERY MORNING
Qty: 90 TABLET | Refills: 0 | OUTPATIENT
Start: 2019-03-14 | End: 2019-06-18 | Stop reason: SDUPTHER

## 2019-04-22 RX ORDER — FENOFIBRATE 145 MG/1
TABLET, COATED ORAL
Qty: 30 TABLET | Refills: 6 | Status: SHIPPED | OUTPATIENT
Start: 2019-04-22 | End: 2019-11-25 | Stop reason: SDUPTHER

## 2019-05-08 ENCOUNTER — TELEPHONE (OUTPATIENT)
Dept: CARDIOLOGY | Facility: CLINIC | Age: 54
End: 2019-05-08

## 2019-05-08 NOTE — TELEPHONE ENCOUNTER
Wanted to know what is making his arms itch when he gets out in the sun??? I asked if he started any new meds? . I advised him ot call his pharmacist to check side effects of his meds.  He agreed and v/u.

## 2019-05-09 NOTE — PROGRESS NOTES
"Subjective     Chief Complaint: Peripheral Vascular Disease    History of Present Illness   Carlos Carson is a 53 y.o. male who presents with past medical history significant for ASCVD, PAD, HTN, dyslipidemia, obesity and tobacco use.  He is here today for followup of his recent right arteriogram (11/16/2018).    He states that since the procedure he has been able to walk without \"much\" right LE pain.  He states that this is a big improvement for him and he is happy with his results.  He does state that he has some numbness and tingling in the right foot still and that his right inner thigh area swells at times.  He is referring to a large vein which swells when he stands or walks for a prolonged period of time.  The vein bulging receded with elevation.      The patient states that he is wanting to have his left leg done.      He has been compliant with his medications and reports that he is using Chantix to quit smoking.        Current Outpatient Medications:   •  amLODIPine (NORVASC) 10 MG tablet, Take 1 tablet by mouth Every Morning., Disp: 90 tablet, Rfl: 2  •  aspirin 81 MG chewable tablet, Chew 81 mg Daily., Disp: , Rfl:   •  atorvastatin (LIPITOR) 40 MG tablet, Take 1 tablet by mouth Daily., Disp: 30 tablet, Rfl: 3  •  fenofibrate (TRICOR) 145 MG tablet, 145 mg by mouth daily take 1 tablet daily, Disp: 30 tablet, Rfl: 6  •  gabapentin (NEURONTIN) 800 MG tablet, Take 800 mg by mouth 4 (Four) Times a Day., Disp: , Rfl: 0  •  HYDROcodone-acetaminophen (NORCO) 7.5-325 MG per tablet, Take 7.5 tablets by mouth 2 (Two) Times a Day., Disp: , Rfl: 0  •  ibuprofen (ADVIL,MOTRIN) 800 MG tablet, Take 800 mg by mouth 3 (Three) Times a Day., Disp: , Rfl: 2  •  metoprolol succinate XL (TOPROL-XL) 25 MG 24 hr tablet, Take 1 tablet by mouth Daily., Disp: 90 tablet, Rfl: 3  •  varenicline (CHANTIX CONTINUING MONTH GENESIS) 1 MG tablet, Take 1 tablet by mouth 2 (Two) Times a Day., Disp: 60 tablet, Rfl: 2  •  Omega-3 Fatty Acids " "(FISH OIL) 1200 MG capsule capsule, Take 1,200 mg by mouth Daily., Disp: , Rfl:      The following portions of the patient's history were reviewed and updated as appropriate: allergies, current medications, past family history, past medical history, past social history, past surgical history and problem list.    Review of Systems   Constitution: Negative for weakness and malaise/fatigue.   Cardiovascular: Positive for leg swelling. Negative for chest pain, dyspnea on exertion, irregular heartbeat, near-syncope, orthopnea, palpitations, paroxysmal nocturnal dyspnea and syncope.   Respiratory: Negative for shortness of breath.    Hematologic/Lymphatic: Does not bruise/bleed easily.   Neurological: Negative for dizziness and light-headedness.         Objective     /91 (BP Location: Left arm, Patient Position: Sitting)   Pulse 91   Ht 185.4 cm (73\")   Wt 119 kg (263 lb)   SpO2 98%   BMI 34.70 kg/m²     Physical Exam   Constitutional: He is oriented to person, place, and time. He appears well-developed and well-nourished.   HENT:   Head: Normocephalic and atraumatic.   Eyes: Pupils are equal, round, and reactive to light.   Neck: No JVD present.   Cardiovascular: Normal rate and regular rhythm. Exam reveals no gallop and no friction rub.   No murmur heard.  Pulses:       Dorsalis pedis pulses are 2+ on the right side, and 0 on the left side.        Posterior tibial pulses are 1+ on the right side, and 0 on the left side.   Both LE are warm, pink, with good cap refill   Pulmonary/Chest: Effort normal and breath sounds normal. No respiratory distress. He has no wheezes. He has no rales.   Neurological: He is alert and oriented to person, place, and time.   Skin: Skin is warm and dry.   Psychiatric: He has a normal mood and affect.   Vitals reviewed.      Procedures    11/19/2018  Peripheral Arteriogram    PROCEDURE PERFORMED:   LEFT femoral artery access with 4 Romansh sheath  Aortogram with bilateral lower " extremity runoff        PROCEDURE COMMENTS:     Carlos Carson was brought to the cath lab and placed on the cardiac catherization table in the postabsortive state. The patient was prepped and draped according to the cath lab protocol under strict aseptic and sterile condition. Patient's right femoral artery sight was prepped and draped. Under fluoroscopic guidance theright femoral artery was punctured using a 21 gauge needle utilizing the modified Seldinger technique. 4 Turkmen sheath was introduced over a wire. After aspirating for blood it was flushed with heparinized saline.     LEFT extremity imaging was performed via the sheath. A 4F RIM catheter was then advanced to the abdominal aorta at the level of L1 and aortogram was performed. Catheter was then pulled back at the bifurcation at L5 and engaged in the contralateral MAAME. RIGHT extremity imaging was then performed. After the procedure was completed the sheath was removed in the cath lab and hemostasis achieved via manual compression. No complications occurred.     Findings:  Aorta:  Abdominal aorta shows only mild disease.  Bilateral renal arteries are faintly visualized but appear patent     Right Lower Extremity:   Common  Iliac artery shows moderate plaque <50%  SFA shows mid-segment 90% stenosis which in some views appears a short  with collateral bridge.  Popliteal Artery was patent with no disease  Tibio-peroneal trunk patent with no disease  Anterior tibial artery was patent  Posterior tibial artery was 100% occluded  Peroneal artery is also occluded in mid  3 Vessel run off present     Left Lower Extremity:  Common iliac artery shows mild disease and calcific changes   SFA is patent with focal 80% stenosis in the mid segment  Popliteal Artery is patent with no disease   Tibio-peroneal trunk patent with no significant disease  Anterior tibial artery is occluded 100%  Posterior tibial artery is patent   3 Vessel run off  present     Recommendations:  Intervention to Right SFA     6x45 sheath was placed  Stabilizer wire was then placed but could not cross.  Support Trailblazer 0.035 was used and Choice PT ES was then used to cross   Atherectomy was then performed  Balloon angioplasty using DCB done        FINAL RESULTS:  Successful Intervention to the Right SFA using:  Atherectomy HawkOne device 6F  Balloon angioplasty DCB with In-Pact Admiral 5 x 150   From 90% to 20% residual with brisk flow to the distal vessel.        EBL: 50 ML  No specimens were removed.     Plan  ASA daily.  Left leg will be assessed in 6-8 weeks     Clark Manriquez MD       Assessment/Plan       Carlos was seen today for peripheral vascular disease.    Diagnoses and all orders for this visit:    Assessment:  1.  Peripherial artery disease, s/p right arteriogram with right SFA atherectomy and angioplasty.  2. ASCVD, stable  3. Essential HTN, followed by primary cardiology.  Pt reports that he has not taken his Norvasc this morning.  4. Hyperlipidemia  5. Tobacco use      Plan:  1. Pt will be scheduled for left arteriogram in 3-4 weeks  2. Continue aspirin  3. Return to clinic 2 weeks after arteriogram      Return in about 6 weeks (around 1/18/2019).      Albertina Fong, ENID   ambulate

## 2019-05-31 ENCOUNTER — TELEPHONE (OUTPATIENT)
Dept: CARDIOLOGY | Facility: CLINIC | Age: 54
End: 2019-05-31

## 2019-05-31 DIAGNOSIS — I25.10 ASCVD (ARTERIOSCLEROTIC CARDIOVASCULAR DISEASE): ICD-10-CM

## 2019-05-31 DIAGNOSIS — R07.2 PRECORDIAL PAIN: Primary | ICD-10-CM

## 2019-05-31 NOTE — TELEPHONE ENCOUNTER
Pt called and states he was told he needed a new stress test order. He reports he had to cancel the previous test date.

## 2019-06-13 ENCOUNTER — HOSPITAL ENCOUNTER (OUTPATIENT)
Dept: CARDIOLOGY | Facility: HOSPITAL | Age: 54
Discharge: HOME | End: 2019-06-13

## 2019-06-13 ENCOUNTER — HOSPITAL ENCOUNTER (OUTPATIENT)
Dept: NUCLEAR MEDICINE | Facility: HOSPITAL | Age: 54
Discharge: HOME | End: 2019-06-13

## 2019-06-13 LAB
BH CV NUCLEAR PRIOR STUDY: 3
BH CV STRESS BP STAGE 1: NORMAL
BH CV STRESS BP STAGE 2: NORMAL
BH CV STRESS COMMENTS STAGE 1: NORMAL
BH CV STRESS COMMENTS STAGE 2: NORMAL
BH CV STRESS DOSE REGADENOSON STAGE 1: 0.4
BH CV STRESS DURATION MIN STAGE 1: 0
BH CV STRESS DURATION MIN STAGE 2: 4
BH CV STRESS DURATION SEC STAGE 1: 10
BH CV STRESS DURATION SEC STAGE 2: 0
BH CV STRESS HR STAGE 1: 90
BH CV STRESS HR STAGE 2: 96
BH CV STRESS PROTOCOL 1: NORMAL
BH CV STRESS RECOVERY BP: NORMAL MMHG
BH CV STRESS RECOVERY HR: 93 BPM
BH CV STRESS STAGE 1: 1
BH CV STRESS STAGE 2: 2
LV EF NUC BP: 56 %
MAXIMAL PREDICTED HEART RATE: 167 BPM
PERCENT MAX PREDICTED HR: 57.49 %
STRESS BASELINE BP: NORMAL MMHG
STRESS BASELINE HR: 86 BPM
STRESS PERCENT HR: 68 %
STRESS POST PEAK BP: NORMAL MMHG
STRESS POST PEAK HR: 96 BPM
STRESS TARGET HR: 142 BPM

## 2019-06-13 PROCEDURE — 25010000002 REGADENOSON 0.4 MG/5ML SOLUTION: Performed by: INTERNAL MEDICINE

## 2019-06-13 PROCEDURE — 0 TECHNETIUM SESTAMIBI: Performed by: NURSE PRACTITIONER

## 2019-06-13 PROCEDURE — A9500 TC99M SESTAMIBI: HCPCS | Performed by: NURSE PRACTITIONER

## 2019-06-13 PROCEDURE — 93018 CV STRESS TEST I&R ONLY: CPT | Performed by: INTERNAL MEDICINE

## 2019-06-13 PROCEDURE — 78452 HT MUSCLE IMAGE SPECT MULT: CPT

## 2019-06-13 PROCEDURE — 78452 HT MUSCLE IMAGE SPECT MULT: CPT | Performed by: INTERNAL MEDICINE

## 2019-06-13 PROCEDURE — 93017 CV STRESS TEST TRACING ONLY: CPT

## 2019-06-13 RX ADMIN — REGADENOSON 0.4 MG: 0.08 INJECTION, SOLUTION INTRAVENOUS at 09:41

## 2019-06-13 RX ADMIN — TECHNETIUM TC 99M SESTAMIBI 1 DOSE: 1 INJECTION INTRAVENOUS at 07:45

## 2019-06-13 RX ADMIN — TECHNETIUM TC 99M SESTAMIBI 1 DOSE: 1 INJECTION INTRAVENOUS at 09:41

## 2019-06-18 DIAGNOSIS — I10 ESSENTIAL HYPERTENSION: ICD-10-CM

## 2019-06-18 RX ORDER — AMLODIPINE BESYLATE 10 MG/1
TABLET ORAL
Qty: 90 TABLET | Refills: 0 | Status: SHIPPED | OUTPATIENT
Start: 2019-06-18 | End: 2019-09-16 | Stop reason: SDUPTHER

## 2019-06-27 ENCOUNTER — LAB (OUTPATIENT)
Dept: LAB | Facility: HOSPITAL | Age: 54
End: 2019-06-27

## 2019-06-27 ENCOUNTER — OFFICE VISIT (OUTPATIENT)
Dept: CARDIOLOGY | Facility: CLINIC | Age: 54
End: 2019-06-27

## 2019-06-27 VITALS
WEIGHT: 285 LBS | OXYGEN SATURATION: 92 % | RESPIRATION RATE: 16 BRPM | SYSTOLIC BLOOD PRESSURE: 121 MMHG | DIASTOLIC BLOOD PRESSURE: 80 MMHG | HEIGHT: 73 IN | HEART RATE: 89 BPM | BODY MASS INDEX: 37.77 KG/M2

## 2019-06-27 DIAGNOSIS — R06.02 SHORTNESS OF BREATH: ICD-10-CM

## 2019-06-27 DIAGNOSIS — Z72.0 TOBACCO ABUSE: ICD-10-CM

## 2019-06-27 DIAGNOSIS — E78.5 DYSLIPIDEMIA: ICD-10-CM

## 2019-06-27 DIAGNOSIS — R63.5 WEIGHT GAIN: ICD-10-CM

## 2019-06-27 DIAGNOSIS — I25.10 ASCVD (ARTERIOSCLEROTIC CARDIOVASCULAR DISEASE): ICD-10-CM

## 2019-06-27 DIAGNOSIS — I73.9 PERIPHERAL ARTERIAL DISEASE (HCC): ICD-10-CM

## 2019-06-27 DIAGNOSIS — I10 ESSENTIAL HYPERTENSION: Primary | ICD-10-CM

## 2019-06-27 LAB
ANION GAP SERPL CALCULATED.3IONS-SCNC: 12.8 MMOL/L (ref 5–15)
BUN BLD-MCNC: 26 MG/DL (ref 6–20)
BUN/CREAT SERPL: 26 (ref 7–25)
CALCIUM SPEC-SCNC: 9.6 MG/DL (ref 8.6–10.5)
CHLORIDE SERPL-SCNC: 104 MMOL/L (ref 98–107)
CO2 SERPL-SCNC: 24.2 MMOL/L (ref 22–29)
CREAT BLD-MCNC: 1 MG/DL (ref 0.76–1.27)
GFR SERPL CREATININE-BSD FRML MDRD: 78 ML/MIN/1.73
GLUCOSE BLD-MCNC: 77 MG/DL (ref 65–99)
NT-PROBNP SERPL-MCNC: 150.9 PG/ML (ref 5–900)
POTASSIUM BLD-SCNC: 4.5 MMOL/L (ref 3.5–5.2)
SODIUM BLD-SCNC: 141 MMOL/L (ref 136–145)
TSH SERPL DL<=0.05 MIU/L-ACNC: 5.28 MIU/ML (ref 0.27–4.2)

## 2019-06-27 PROCEDURE — 99214 OFFICE O/P EST MOD 30 MIN: CPT | Performed by: NURSE PRACTITIONER

## 2019-06-27 PROCEDURE — 36415 COLL VENOUS BLD VENIPUNCTURE: CPT

## 2019-06-27 PROCEDURE — 83880 ASSAY OF NATRIURETIC PEPTIDE: CPT

## 2019-06-27 PROCEDURE — 80048 BASIC METABOLIC PNL TOTAL CA: CPT

## 2019-06-27 PROCEDURE — 84443 ASSAY THYROID STIM HORMONE: CPT

## 2019-06-27 RX ORDER — BUPROPION HYDROCHLORIDE 150 MG/1
150 TABLET, EXTENDED RELEASE ORAL 2 TIMES DAILY
COMMUNITY

## 2019-06-27 NOTE — PROGRESS NOTES
Marta Davis, APRN  Carlos Carson  1965 06/27/2019    Patient Active Problem List   Diagnosis   • Essential hypertension   • Coronary artery disease without angina pectoris, non-hemodynamically significant   • PVD (peripheral vascular disease) (CMS/[facility])   • Dyslipidemia   • Tobacco abuse   • Simple chronic bronchitis (CMS/HCC)   • Obesity (BMI 30-39.9)   • Cough due to ACE inhibitor (Lisinopril)   • Chronic bronchitis (CMS/[facility])   • ASCVD (arteriosclerotic cardiovascular disease), apparently nonobstructive, clinically stable.   • Peripheral arterial disease, involving both lower extremities with bilateral leg claudication.   • Chronic fatigue   • PAD (peripheral artery disease) (CMS/[facility])       Dear Marta Davis, APRN:    Subjective     Chief Complaint   Patient presents with   • Follow-up     stress findings   • Shortness of Breath     routine activity   • Med Management     list provided           History of Present Illness:    Carlos Carson is a 53 y.o. male with a history of nonobstructive coronary artery disease with further details unavailable, dyslipidemia, hypertension, severe peripheral arterial disease status post arteriogram and intervention to bilateral lower extremities in November of 2018 and again in January of 2019, COPD, and tobacco abuse. He presents today for routine cardiology follow up. Previously, he had been having chest pains. He was evaluated with lexiscan stress test which was negative for evidence of ischemia. He reports his chest pains have resolved over the past few months. He reports he has gained 30 lbs over the past few months. He has not changed his eating habits. This has caused shortness of breath. He has abdominal distention and bloating. He denies peripheral edema. He continues to smoke. He has tried Chantix but this did not help. He is now trying Wellbutrin.          Allergies   Allergen Reactions   • Lisinopril Cough   :      Current Outpatient  Medications:   •  amLODIPine (NORVASC) 10 MG tablet, TAKE ONE TABLET BY MOUTH EVERY DAY, Disp: 90 tablet, Rfl: 0  •  aspirin 81 MG chewable tablet, Chew 81 mg Daily., Disp: , Rfl:   •  buPROPion SR (WELLBUTRIN SR) 150 MG 12 hr tablet, Take 150 mg by mouth Daily., Disp: , Rfl:   •  clopidogrel (PLAVIX) 75 MG tablet, Take 1 tablet by mouth Daily., Disp: 30 tablet, Rfl: 11  •  fenofibrate (TRICOR) 145 MG tablet, TAKE ONE TABLET BY MOUTH EVERY DAY, Disp: 30 tablet, Rfl: 6  •  gabapentin (NEURONTIN) 800 MG tablet, Take 800 mg by mouth 3 (Three) Times a Day., Disp: , Rfl: 0  •  HYDROcodone-acetaminophen (NORCO) 7.5-325 MG per tablet, Take 7.5 tablets by mouth 2 (Two) Times a Day., Disp: , Rfl: 0  •  ibuprofen (ADVIL,MOTRIN) 800 MG tablet, Take 800 mg by mouth 3 (Three) Times a Day., Disp: , Rfl: 2  •  metoprolol succinate XL (TOPROL-XL) 50 MG 24 hr tablet, Take 1 tablet by mouth Daily., Disp: 30 tablet, Rfl: 5  •  rosuvastatin (CRESTOR) 10 MG tablet, Take 1 tablet by mouth Daily., Disp: 30 tablet, Rfl: 5      The following portions of the patient's history were reviewed and updated as appropriate: allergies, current medications, past family history, past medical history, past social history, past surgical history and problem list.    Social History     Tobacco Use   • Smoking status: Current Every Day Smoker     Packs/day: 2.00     Years: 46.00     Pack years: 92.00     Types: Cigarettes   • Smokeless tobacco: Never Used   Substance Use Topics   • Alcohol use: Yes     Alcohol/week: 3.6 oz     Types: 6 Cans of beer per week   • Drug use: No       Review of Systems   Constitution: Positive for weight gain. Negative for weakness and malaise/fatigue.   Cardiovascular: Negative for chest pain, dyspnea on exertion, irregular heartbeat, leg swelling, near-syncope, orthopnea, palpitations, paroxysmal nocturnal dyspnea and syncope.   Respiratory: Positive for shortness of breath. Negative for cough and wheezing.    Neurological:  "Negative for dizziness and light-headedness.       Objective   Vitals:    06/27/19 1214   BP: 121/80   Pulse: 89   Resp: 16   SpO2: 92%   Weight: 129 kg (285 lb)   Height: 185.4 cm (73\")     Body mass index is 37.6 kg/m².        Physical Exam   Constitutional: He is oriented to person, place, and time. He appears well-developed and well-nourished.   HENT:   Head: Normocephalic and atraumatic.   Cardiovascular: Normal rate, regular rhythm and normal heart sounds. Exam reveals no S3 and no S4.   No murmur heard.  Pulmonary/Chest: Effort normal. He has wheezes. He has no rales.   Abdominal: Soft. Bowel sounds are normal. He exhibits distension.   Musculoskeletal: He exhibits no edema.   Neurological: He is alert and oriented to person, place, and time.   Skin: Skin is warm and dry.   Psychiatric: He has a normal mood and affect. His behavior is normal.       Lab Results   Component Value Date     01/03/2019    K 4.6 01/03/2019     01/03/2019    CO2 29.7 01/03/2019    BUN 22 (H) 01/03/2019    CREATININE 0.98 01/03/2019    GLUCOSE 111 (H) 01/03/2019    CALCIUM 9.1 01/03/2019    AST 32 01/03/2019    ALT 52 (H) 01/03/2019    ALKPHOS 77 01/03/2019        Lab Results   Component Value Date    WBC 8.07 01/03/2019    HGB 16.4 01/03/2019    HCT 46.6 01/03/2019     01/03/2019        Lab Results   Component Value Date    TSH 2.129 02/28/2018    TRIG 335 (H) 01/03/2019    HDL 39 (L) 01/03/2019     (H) 01/03/2019             Procedures      Assessment/Plan    Diagnosis Plan   1. Essential hypertension     2. ASCVD (arteriosclerotic cardiovascular disease), apparently nonobstructive, clinically stable.  XR Chest 2 View    Adult Transthoracic Echo Complete W/ Cont if Necessary Per Protocol   3. Peripheral arterial disease, involving both lower extremities with bilateral leg claudication.  Adult Transthoracic Echo Complete W/ Cont if Necessary Per Protocol   4. Dyslipidemia     5. Tobacco abuse     6. Weight " gain  Basic Metabolic Panel    BNP    TSH   7. Shortness of breath  Basic Metabolic Panel    BNP    XR Chest 2 View    Adult Transthoracic Echo Complete W/ Cont if Necessary Per Protocol                Recommendations:    1. I have reviewed stress test results with the patient today.      2. I have ordered a BMP, BNP, and chest x-ray to rule out heart failure given his weight gain and worsening dyspnea.    3. Echocardiogram ordered to evaluate LV function and tailor further therapy accordingly.    4. I have advised him to avoid high sodium foods.    5. Will also check a TSH to rule out thyroid disease.    6. Follow up in 3 months and PRN.      Return in about 3 months (around 9/27/2019) for Recheck.    As always, I appreciate very much the opportunity to participate in the cardiovascular care of your patients.      With Best Regards,    ENID Davenport

## 2019-07-18 NOTE — PROGRESS NOTES
[facility] CARDIOLOGY  [address]  [place] [address]  Phone: [phone]  Fax: [phone]    07/24/2019    Chief Complaint   Patient presents with   • Peripheral Vascular Disease   • Claudication        History:   Carlos Carson is a 54 y.o. male seen  for a 3 month follow up PAD, HTN, and HLD.  Patient still states he is doing well and is able to walk more without his leg cramping up. He denies any limitations with daily activity. He denies any chest pain or palpitations.  He presents today with complaints of dyspnea day.  He is still trying to quit smoking.  He is down 1.2 ppd.  Failed taking Chantix. BLE pain has resolved. Good pulses bilaterally. He has gained about 50 lbs in 6 months.    The chart and medications were reviewed today.           Past Medical History:   Diagnosis Date   • Hyperlipidemia    • Hypertension    • PAD (peripheral artery disease) (CMS/[facility])        Past Surgical History:   Procedure Laterality Date   • ARTERIOGRAM N/A 11/16/2018    Procedure: Right Arteriogram;  Surgeon: Clark Manriquez MD;  Location: [facility] CATH INVASIVE LOCATION;  Service: Cardiology   • ARTERIOGRAM N/A 1/3/2019    Procedure: Arteriogram-- LEFT LOWER EXTREMITY;  Surgeon: Clark Manriquez MD;  Location:  [facility] CATH INVASIVE LOCATION;  Service: Cardiology   • CARDIAC CATHETERIZATION  2011   • CARDIAC CATHETERIZATION N/A 11/16/2018    Procedure: Atherectomy-peripheral;  Surgeon: Clrak Manriquez MD;  Location: [facility] CATH INVASIVE LOCATION;  Service: Cardiology        Past Social History:  Social History     Socioeconomic History   • Marital status: Single     Spouse name: Not on file   • Number of children: Not on file   • Years of education: Not on file   • Highest education level: Not on file   Tobacco Use   • Smoking status: Current Every Day Smoker     Packs/day: 2.00     Years: 46.00     Pack years: 92.00     Types: Cigarettes   • Smokeless tobacco: Never Used   Substance and  Sexual Activity   • Alcohol use: Yes     Alcohol/week: 3.6 oz     Types: 6 Cans of beer per week   • Drug use: No   • Sexual activity: Defer       Past Family History:  Family History   Problem Relation Age of Onset   • Hypertension Mother    • Heart disease Father 65        CABG x3v 99% blockage   • No Known Problems Sister    • No Known Problems Brother        Review of Systems:   Please see HPI  Constitution: No chills, no rigors, no unexplained weight loss or weight gain  Eyes:  No diplopia, no blurred vision, no loss of vision, conjunctiva is pink and sclera is anicteric  ENT:  No tinnitus, no otorrhea, no epistaxis, no sore throat   Respiratory: No cough, no hemoptysis  Cardiovascular: see HPI  Gastrointestinal: No nausea, no vomiting, no hematemesis, no diarrhea or constipation, no melena  Genitourinary: No frequency of dysuria no hematuria  Integument: No pruritis and  no skin rash  Hematologic / Lymphatic: No excessive bleeding, easy bruising, fatigue, lymphadenopathy and petechiae  Musculoskeletal: No joint pain, joint stiffness, joint swelling, muscle pain, muscle weakness and neck pain  Neurological: No dizziness, headaches, light headedness, seizures and vertigo  Endocrine: No frequent urination and nocturia, temperature intolerance, weight gain, unintended and weight loss, unintended      Current Outpatient Medications   Medication Sig Dispense Refill   • amLODIPine (NORVASC) 10 MG tablet TAKE ONE TABLET BY MOUTH EVERY DAY 90 tablet 0   • aspirin 81 MG chewable tablet Chew 81 mg Daily.     • buPROPion SR (WELLBUTRIN SR) 150 MG 12 hr tablet Take 150 mg by mouth Daily.     • clopidogrel (PLAVIX) 75 MG tablet Take 1 tablet by mouth Daily. 30 tablet 11   • fenofibrate (TRICOR) 145 MG tablet TAKE ONE TABLET BY MOUTH EVERY DAY 30 tablet 6   • gabapentin (NEURONTIN) 800 MG tablet Take 800 mg by mouth 3 (Three) Times a Day.  0   • HYDROcodone-acetaminophen (NORCO) 7.5-325 MG per tablet Take 7.5 tablets by  mouth 2 (Two) Times a Day.  0   • ibuprofen (ADVIL,MOTRIN) 800 MG tablet Take 800 mg by mouth 3 (Three) Times a Day.  2   • metoprolol succinate XL (TOPROL-XL) 50 MG 24 hr tablet Take 1 tablet by mouth Daily. 30 tablet 5   • rosuvastatin (CRESTOR) 10 MG tablet Take 1 tablet by mouth Daily. 30 tablet 5     No current facility-administered medications for this visit.         Allergies   Allergen Reactions   • Lisinopril Cough       Objective:  Vitals:    07/24/19 1510   BP: 122/85   Pulse: 85   SpO2: 92%     Comfortable NAD  PERRL, conjunctiva clear  Neck supple, no JVD or thyromegaly appreciated  S1/S2 RRR, no m/r/g  Lungs CTA B, normal effort, rales  Abdomen S/NT/ND (+) BS, no HSM appreciated  Extremities warm, no clubbing, cyanosis, or edema  Normal gait  No visible or palpable skin lesions  A/Ox4, mood and affect appropriate  Pulse exam:    Feet are warm bilateral  1+ edema bilateral  Capillary refill is normal  No evidence of ulceration or color change of the toes  PULSES  Right DP and PT are 1+ and Left DP and PT are 2+    DATA:      Results for orders placed in visit on 11/14/16   SCANNED - ECHOCARDIOGRAM      Results for orders placed during the hospital encounter of 03/12/19   Stress Test With Myocardial Perfusion    Narrative · A pharmacological stress test was performed using regadenoson  · Findings consistent with a normal ECG stress test.  · Myocardial perfusion imaging indicates a normal myocardial perfusion   study with no evidence of ischemia.  · TID:1.35  · Normal LV cavity size. Normal LV wall motion noted.  · Left ventricular ejection fraction is normal (Calculated EF = 56%).  · Impressions are consistent with a low risk study.         Results for orders placed during the hospital encounter of 03/12/19   Stress Test With Myocardial Perfusion    Narrative · A pharmacological stress test was performed using regadenoson  · Findings consistent with a normal ECG stress test.  · Myocardial perfusion  imaging indicates a normal myocardial perfusion   study with no evidence of ischemia.  · TID:1.35  · Normal LV cavity size. Normal LV wall motion noted.  · Left ventricular ejection fraction is normal (Calculated EF = 56%).  · Impressions are consistent with a low risk study.         Results for orders placed during the hospital encounter of 01/03/19   Arteriogram    Narrative PERIPHERAL ARTERIOGRAM / INTERVENTION REPORT     DATE OF PROCEDURE: 01/03/19     INDICATION FOR PROCEDURE: Severe claudication of the left leg. Previous   atherectomy of the right SFA in Nov 2018.         PROCEDURE PERFORMED:   Right femoral artery access with 5 Spanish sheath  Limited bilateral lower extremity runoff  Atherectomy and Drug Coated balloon of the left SFA       PROCEDURE COMMENTS:  Carlos Carson was brought to the cath lab and placed on the cardiac   catherization table in the postabsortive state. Time out was taken and   patient received moderate sedation. The patient was prepped and draped   according to the cath lab protocol under strict aseptic and sterile   condition. Patient's right femoral artery sight was prepped and draped.   Under fluoroscopic guidance theright femoral artery was punctured using a   micropuncture needle utilizing the modified Seldinger technique. 5 [place]   sheath was introduced over a wire. After aspirating for blood it was   flushed with heparinized saline.    Right extremity imaging was performed via the sheath. A 4F RIM catheter   was then advanced to the abdominal aorta at the level of L1 and aortogram   was performed. Catheter was then pulled back at the bifurcation at L5 and   engaged in the contralateral MAAME. Left extremity imaging was then   performed. After the procedure was completed the sheath was removed in the   cath lab and hemostasis achieved via manual compression. No complications   occurred. Patient tolerated the procedure well.     Findings:    Right Lower Extremity:   Common  Iliac  artery shows a 50-60% stenosis with J-wire hanging up in   this lesion  SFA shows previous atherectomy site wide open with good flow  Popliteal Artery was patent with no disease  Tibio-peroneal trunk patent with no disease  Anterior tibial artery was 100% occluded  Posterior tibial artery was patent  2 Vessel run off present    Left Lower Extremity:  Common iliac artery shows mild to moderate disease and calcific changes  SFA shows prox and distal third with up to 80% stenosis  Tibio-peroneal trunk patent with moderate disease  Anterior tibial artery was initially showing poor filling but was seen to   fill after the intervention on SFA  Posterior tibial artery is patent with no disease  2 Vessel run off present    EBL: 50 ML  No specimens were removed.    INTERVENTION DETAILS:  A 7x45 destination sheath placed  A Command wire 0.014 was used to cross lesions and placed BTP  Atherectomy was performed to both the prox and distal lesions and large   amount of plaque removed  DCB with Admiral In-Pact 6x80 used    Final Result:  Successful Atherectomy and balloon angioplasty with DCB of the prox and   distal SFA using  OwlientkOne 7F device   Admiral DCB 6x80  With stenosis reduction from 80% to 30% residual.    Plan  Asa and Plavix for 6 months  Pt will need work up for his new onset Dyspnea  He is OK to proceed with Diet control meds.    Clark Manriquez MD       Procedures       Assessment:    Severe PAD bilateral with claudication  Left SFA atherectomy and DCB  Right SFA atherectomy and DCB  Stable essential hypertension  Obesity      Plan:  Recommend a weight loss program with dietitian.   He may use phentermine for 3 months.  Continue current medications.  Advised diet and exercise  FU in 3 months.      I extensively discussed consequences of smoking namely cardiovascular/metabolic, lung cancer, mouth cancer, pulmonary disorder including COPD and reduced quality of life.  At the end of the conversation, patient  voices understanding of the risk involved in smoking.  Patient also understands the benefits of quitting.  I provided the patient smoking cessation material. Patient displayed understanding and stated that he will try to quit smoking.  During this visit I spent 3-5 minutes counseling the patient regarding the smoking cessation.        Patient's Body mass index is 37.68 kg/m². BMI is above normal parameters. Recommendations include: exercise counseling.       No diagnosis found.     Thank you for allowing me to participate in the care of Carlos Carson. Feel free to contact me directly with any further questions or concerns.        Director, Cardiac Cath Lab    NARESH Woodard, acting as scribe for Clark Manriquez MD, [facility], [facility].   07/24/19  3:12 PM

## 2019-07-24 ENCOUNTER — OFFICE VISIT (OUTPATIENT)
Dept: CARDIOLOGY | Facility: CLINIC | Age: 54
End: 2019-07-24

## 2019-07-24 VITALS
BODY MASS INDEX: 37.85 KG/M2 | HEIGHT: 73 IN | HEART RATE: 85 BPM | SYSTOLIC BLOOD PRESSURE: 122 MMHG | DIASTOLIC BLOOD PRESSURE: 85 MMHG | OXYGEN SATURATION: 92 % | WEIGHT: 285.6 LBS

## 2019-07-24 DIAGNOSIS — Z72.0 TOBACCO ABUSE: ICD-10-CM

## 2019-07-24 DIAGNOSIS — I73.9 PAD (PERIPHERAL ARTERY DISEASE) (HCC): Primary | ICD-10-CM

## 2019-07-24 DIAGNOSIS — I25.10 CORONARY ARTERY DISEASE INVOLVING NATIVE CORONARY ARTERY OF NATIVE HEART WITHOUT ANGINA PECTORIS: ICD-10-CM

## 2019-07-24 PROCEDURE — 99406 BEHAV CHNG SMOKING 3-10 MIN: CPT | Performed by: INTERNAL MEDICINE

## 2019-07-24 PROCEDURE — 99214 OFFICE O/P EST MOD 30 MIN: CPT | Performed by: INTERNAL MEDICINE

## 2019-08-23 DIAGNOSIS — E78.5 DYSLIPIDEMIA: ICD-10-CM

## 2019-08-23 RX ORDER — ROSUVASTATIN CALCIUM 10 MG/1
10 TABLET, COATED ORAL DAILY
Qty: 30 TABLET | Refills: 2 | OUTPATIENT
Start: 2019-08-23 | End: 2019-11-21 | Stop reason: SDUPTHER

## 2019-08-26 DIAGNOSIS — I10 ESSENTIAL HYPERTENSION: ICD-10-CM

## 2019-08-26 DIAGNOSIS — I25.10 ASCVD (ARTERIOSCLEROTIC CARDIOVASCULAR DISEASE): ICD-10-CM

## 2019-08-26 RX ORDER — METOPROLOL SUCCINATE 50 MG/1
50 TABLET, EXTENDED RELEASE ORAL DAILY
Qty: 30 TABLET | Refills: 1 | Status: SHIPPED | OUTPATIENT
Start: 2019-08-26 | End: 2019-10-30 | Stop reason: SDUPTHER

## 2019-09-16 DIAGNOSIS — I10 ESSENTIAL HYPERTENSION: ICD-10-CM

## 2019-09-16 RX ORDER — AMLODIPINE BESYLATE 10 MG/1
TABLET ORAL
Qty: 30 TABLET | Refills: 0 | Status: SHIPPED | OUTPATIENT
Start: 2019-09-16 | End: 2019-10-18 | Stop reason: SDUPTHER

## 2019-10-18 DIAGNOSIS — I10 ESSENTIAL HYPERTENSION: ICD-10-CM

## 2019-10-18 RX ORDER — AMLODIPINE BESYLATE 10 MG/1
10 TABLET ORAL DAILY
Qty: 30 TABLET | Refills: 0 | Status: SHIPPED | OUTPATIENT
Start: 2019-10-18 | End: 2019-11-25 | Stop reason: SDUPTHER

## 2019-10-22 ENCOUNTER — HOSPITAL ENCOUNTER (OUTPATIENT)
Dept: CARDIOLOGY | Facility: HOSPITAL | Age: 54
Discharge: HOME | End: 2019-10-22
Admitting: NURSE PRACTITIONER

## 2019-10-22 PROCEDURE — 93306 TTE W/DOPPLER COMPLETE: CPT

## 2019-10-22 PROCEDURE — 93306 TTE W/DOPPLER COMPLETE: CPT | Performed by: INTERNAL MEDICINE

## 2019-10-30 ENCOUNTER — TELEPHONE (OUTPATIENT)
Dept: CARDIOLOGY | Facility: CLINIC | Age: 54
End: 2019-10-30

## 2019-10-30 DIAGNOSIS — I25.10 ASCVD (ARTERIOSCLEROTIC CARDIOVASCULAR DISEASE): ICD-10-CM

## 2019-10-30 DIAGNOSIS — I10 ESSENTIAL HYPERTENSION: ICD-10-CM

## 2019-10-30 RX ORDER — METOPROLOL SUCCINATE 50 MG/1
50 TABLET, EXTENDED RELEASE ORAL DAILY
Qty: 30 TABLET | Refills: 0 | Status: SHIPPED | OUTPATIENT
Start: 2019-10-30 | End: 2019-11-25 | Stop reason: SDUPTHER

## 2019-11-01 ENCOUNTER — OFFICE VISIT (OUTPATIENT)
Dept: CARDIOLOGY | Facility: CLINIC | Age: 54
End: 2019-11-01

## 2019-11-01 VITALS
SYSTOLIC BLOOD PRESSURE: 125 MMHG | BODY MASS INDEX: 38.3 KG/M2 | HEIGHT: 73 IN | HEART RATE: 88 BPM | DIASTOLIC BLOOD PRESSURE: 79 MMHG | WEIGHT: 289 LBS | OXYGEN SATURATION: 96 %

## 2019-11-01 DIAGNOSIS — I73.9 PERIPHERAL ARTERIAL DISEASE (HCC): ICD-10-CM

## 2019-11-01 DIAGNOSIS — I25.10 ASCVD (ARTERIOSCLEROTIC CARDIOVASCULAR DISEASE): Primary | ICD-10-CM

## 2019-11-01 DIAGNOSIS — Z72.0 TOBACCO ABUSE: ICD-10-CM

## 2019-11-01 DIAGNOSIS — I10 ESSENTIAL HYPERTENSION: ICD-10-CM

## 2019-11-01 DIAGNOSIS — E78.5 DYSLIPIDEMIA: ICD-10-CM

## 2019-11-01 PROCEDURE — 99213 OFFICE O/P EST LOW 20 MIN: CPT | Performed by: NURSE PRACTITIONER

## 2019-11-01 PROCEDURE — 93000 ELECTROCARDIOGRAM COMPLETE: CPT | Performed by: NURSE PRACTITIONER

## 2019-11-01 NOTE — PROGRESS NOTES
Marta Davis, APRN  Carlos Carson  1965 11/01/2019    Patient Active Problem List   Diagnosis   • Essential hypertension   • Coronary artery disease without angina pectoris, non-hemodynamically significant   • PVD (peripheral vascular disease) (CMS/[facility])   • Dyslipidemia   • Tobacco abuse   • Simple chronic bronchitis (CMS/HCC)   • Obesity (BMI 30-39.9)   • Cough due to ACE inhibitor (Lisinopril)   • Chronic bronchitis (CMS/[facility])   • ASCVD (arteriosclerotic cardiovascular disease), apparently nonobstructive, clinically stable.   • Peripheral arterial disease, involving both lower extremities with bilateral leg claudication.   • Chronic fatigue   • PAD (peripheral artery disease) (CMS/[facility])       Dear Marta Davis, APRN:    Subjective     Chief Complaint   Patient presents with   • Follow-up   • Med Management     med list.    • Shortness of Breath           History of Present Illness:    Carlos Carson is a 54 y.o. male with a past medical history significant for nonobstructive coronary artery disease, dyslipidemia, hypertension, severe peripheral arterial disease status post arteriogram and intervention of bilateral lower extremities in November 2018 and again in January 2019, COPD, and tobacco abuse.  Is previously having chest pains and shortness of breath.  A Lexiscan stress test revealed no evidence of ischemia.  Echocardiogram revealed normal EF with grade 1 diastolic dysfunction and mild mitral valve regurgitation.  He has been gradually gaining weight over the past several months.  A TSH was abnormal.  This was forwarded to his PCP.  He has not yet followed up with her regarding this.  He reports he is now doing very well.  He denies any chest pains, palpitations, dizziness, lightheadedness.  He does report chronic shortness of breath which is not recently worsened.          Allergies   Allergen Reactions   • Lisinopril Cough   :      Current Outpatient Medications:   •  amLODIPine  (NORVASC) 10 MG tablet, Take 1 tablet by mouth Daily., Disp: 30 tablet, Rfl: 0  •  aspirin 81 MG chewable tablet, Chew 81 mg Daily., Disp: , Rfl:   •  buPROPion SR (WELLBUTRIN SR) 150 MG 12 hr tablet, Take 150 mg by mouth Daily., Disp: , Rfl:   •  clopidogrel (PLAVIX) 75 MG tablet, Take 1 tablet by mouth Daily., Disp: 30 tablet, Rfl: 11  •  fenofibrate (TRICOR) 145 MG tablet, TAKE ONE TABLET BY MOUTH EVERY DAY, Disp: 30 tablet, Rfl: 6  •  gabapentin (NEURONTIN) 800 MG tablet, Take 800 mg by mouth 3 (Three) Times a Day., Disp: , Rfl: 0  •  HYDROcodone-acetaminophen (NORCO) 7.5-325 MG per tablet, Take 7.5 tablets by mouth 2 (Two) Times a Day., Disp: , Rfl: 0  •  ibuprofen (ADVIL,MOTRIN) 800 MG tablet, Take 800 mg by mouth 3 (Three) Times a Day., Disp: , Rfl: 2  •  metoprolol succinate XL (TOPROL-XL) 50 MG 24 hr tablet, Take 1 tablet by mouth Daily., Disp: 30 tablet, Rfl: 0  •  rosuvastatin (CRESTOR) 10 MG tablet, Take 1 tablet by mouth Daily., Disp: 30 tablet, Rfl: 2      The following portions of the patient's history were reviewed and updated as appropriate: allergies, current medications, past family history, past medical history, past social history, past surgical history and problem list.    Social History     Tobacco Use   • Smoking status: Current Every Day Smoker     Packs/day: 2.00     Years: 46.00     Pack years: 92.00     Types: Cigarettes   • Smokeless tobacco: Never Used   Substance Use Topics   • Alcohol use: Yes     Alcohol/week: 3.6 oz     Types: 6 Cans of beer per week   • Drug use: No       Review of Systems   Constitution: Positive for weight gain. Negative for weakness and malaise/fatigue.   Cardiovascular: Negative for chest pain, dyspnea on exertion, irregular heartbeat, leg swelling, near-syncope, orthopnea, palpitations, paroxysmal nocturnal dyspnea and syncope.   Respiratory: Positive for shortness of breath. Negative for cough and wheezing.    Neurological: Negative for dizziness and  "light-headedness.       Objective   Vitals:    11/01/19 0809   BP: 125/79   BP Location: Right arm   Patient Position: Sitting   Cuff Size: Adult   Pulse: 88   SpO2: 96%   Weight: 131 kg (289 lb)   Height: 185.4 cm (73\")     Body mass index is 38.13 kg/m².        Physical Exam   Constitutional: He is oriented to person, place, and time. He appears well-developed and well-nourished.   HENT:   Head: Normocephalic and atraumatic.   Cardiovascular: Normal rate, regular rhythm and normal heart sounds. Exam reveals no S3 and no S4.   No murmur heard.  Pulmonary/Chest: Effort normal. He has wheezes. He has no rales.   Abdominal: Soft. Bowel sounds are normal.   Musculoskeletal: He exhibits no edema.   Neurological: He is alert and oriented to person, place, and time.   Skin: Skin is warm and dry.   Psychiatric: He has a normal mood and affect. His behavior is normal.       Lab Results   Component Value Date     06/27/2019    K 4.5 06/27/2019     06/27/2019    CO2 24.2 06/27/2019    BUN 26 (H) 06/27/2019    CREATININE 1.00 06/27/2019    GLUCOSE 77 06/27/2019    CALCIUM 9.6 06/27/2019    AST 32 01/03/2019    ALT 52 (H) 01/03/2019    ALKPHOS 77 01/03/2019     No results found for: CKTOTAL  Lab Results   Component Value Date    WBC 8.07 01/03/2019    HGB 16.4 01/03/2019    HCT 46.6 01/03/2019     01/03/2019     No results found for: INR  No results found for: MG  Lab Results   Component Value Date    TSH 5.280 (H) 06/27/2019    TRIG 335 (H) 01/03/2019    HDL 39 (L) 01/03/2019     (H) 01/03/2019      No results found for: BNP          ECG 12 Lead  Date/Time: 11/1/2019 8:11 AM  Performed by: Cassi Rushing APRN  Authorized by: Cassi Rushing APRN   Comparison: compared with previous ECG   Similar to previous ECG  Rhythm: sinus rhythm  BPM: 90  Q waves: II, III and aVF    ST Elevation: II and III (chronic)                Assessment/Plan    Diagnosis Plan   1. ASCVD (arteriosclerotic " cardiovascular disease), apparently nonobstructive, clinically stable.  ECG 12 Lead   2. Essential hypertension  ECG 12 Lead   3. Peripheral arterial disease, involving both lower extremities with bilateral leg claudication.  ECG 12 Lead   4. Tobacco abuse  ECG 12 Lead   5. Dyslipidemia  ECG 12 Lead                Recommendations:    1.  I have reviewed the echocardiogram results with the patient today.    2.  I have forwarded TSH results again to his PCP.  I have asked him to discuss this with her at his next visit.    3.  Continue low-dose aspirin, Plavix, metoprolol, amlodipine, and Crestor.    4.  Follow-up in 6 months and if needed.      Return in about 6 months (around 5/1/2020) for Recheck.    As always, I appreciate very much the opportunity to participate in the cardiovascular care of your patients.      With Best Regards,    ENID Davenport

## 2019-11-21 DIAGNOSIS — E78.5 DYSLIPIDEMIA: ICD-10-CM

## 2019-11-21 RX ORDER — ROSUVASTATIN CALCIUM 10 MG/1
10 TABLET, COATED ORAL DAILY
Qty: 90 TABLET | Refills: 1 | Status: SHIPPED | OUTPATIENT
Start: 2019-11-21 | End: 2020-05-01 | Stop reason: SDUPTHER

## 2019-11-25 DIAGNOSIS — I25.10 ASCVD (ARTERIOSCLEROTIC CARDIOVASCULAR DISEASE): ICD-10-CM

## 2019-11-25 DIAGNOSIS — I10 ESSENTIAL HYPERTENSION: ICD-10-CM

## 2019-11-25 RX ORDER — FENOFIBRATE 145 MG/1
TABLET, COATED ORAL
Qty: 30 TABLET | Refills: 3 | Status: SHIPPED | OUTPATIENT
Start: 2019-11-25 | End: 2020-02-27 | Stop reason: SDUPTHER

## 2019-11-25 RX ORDER — METOPROLOL SUCCINATE 50 MG/1
50 TABLET, EXTENDED RELEASE ORAL DAILY
Qty: 30 TABLET | Refills: 3 | Status: SHIPPED | OUTPATIENT
Start: 2019-11-25 | End: 2020-02-27 | Stop reason: SDUPTHER

## 2019-11-25 RX ORDER — AMLODIPINE BESYLATE 10 MG/1
10 TABLET ORAL DAILY
Qty: 30 TABLET | Refills: 3 | Status: SHIPPED | OUTPATIENT
Start: 2019-11-25 | End: 2020-03-05 | Stop reason: SDUPTHER

## 2019-11-25 NOTE — TELEPHONE ENCOUNTER
Pt called and requested refills on his Amlodipine and Metoprolol. I advised him to contact his pharmacy and he stated that advised him to contact us.  I will send in his RX's.

## 2020-01-23 RX ORDER — CLOPIDOGREL BISULFATE 75 MG/1
75 TABLET ORAL DAILY
Qty: 30 TABLET | Refills: 11 | Status: SHIPPED | OUTPATIENT
Start: 2020-01-23 | End: 2021-01-28 | Stop reason: SDUPTHER

## 2020-02-27 DIAGNOSIS — I10 ESSENTIAL HYPERTENSION: ICD-10-CM

## 2020-02-27 DIAGNOSIS — I25.10 ASCVD (ARTERIOSCLEROTIC CARDIOVASCULAR DISEASE): ICD-10-CM

## 2020-02-27 RX ORDER — FENOFIBRATE 145 MG/1
TABLET, COATED ORAL
Qty: 30 TABLET | Refills: 3 | Status: SHIPPED | OUTPATIENT
Start: 2020-02-27 | End: 2020-05-01 | Stop reason: SDUPTHER

## 2020-02-27 RX ORDER — METOPROLOL SUCCINATE 50 MG/1
50 TABLET, EXTENDED RELEASE ORAL DAILY
Qty: 30 TABLET | Refills: 3 | Status: SHIPPED | OUTPATIENT
Start: 2020-02-27 | End: 2020-05-01 | Stop reason: SDUPTHER

## 2020-03-04 ENCOUNTER — OFFICE VISIT (OUTPATIENT)
Dept: CARDIOLOGY | Facility: CLINIC | Age: 55
End: 2020-03-04

## 2020-03-04 VITALS
HEIGHT: 73 IN | BODY MASS INDEX: 39.89 KG/M2 | HEART RATE: 90 BPM | SYSTOLIC BLOOD PRESSURE: 173 MMHG | OXYGEN SATURATION: 91 % | WEIGHT: 301 LBS | DIASTOLIC BLOOD PRESSURE: 84 MMHG

## 2020-03-04 DIAGNOSIS — E78.5 DYSLIPIDEMIA: ICD-10-CM

## 2020-03-04 DIAGNOSIS — Z72.0 TOBACCO ABUSE: ICD-10-CM

## 2020-03-04 DIAGNOSIS — I73.9 CLAUDICATION IN PERIPHERAL VASCULAR DISEASE (HCC): ICD-10-CM

## 2020-03-04 DIAGNOSIS — I25.10 ASCVD (ARTERIOSCLEROTIC CARDIOVASCULAR DISEASE): ICD-10-CM

## 2020-03-04 DIAGNOSIS — R06.02 SHORTNESS OF BREATH: Primary | ICD-10-CM

## 2020-03-04 PROCEDURE — 99214 OFFICE O/P EST MOD 30 MIN: CPT | Performed by: INTERNAL MEDICINE

## 2020-03-04 NOTE — PROGRESS NOTES
"      [facility] CARDIOLOGY  [address]  [place] [address]  Phone: [phone]  Fax: [phone]    03/04/2020    Chief Complaint   Patient presents with   • Weight Gain   • Shortness of Breath        History:   Carlos Carson is a 54 y.o. male seen in followup, 6 months.  He states that he is still smoking. He is planning to quit \"cold turkey\" per patient. He has diffuse wheezing bilaterally.  No pain in his legs.Palpable pulses. Doing well without any limitations with activity. Last echo with EF 61-65% 3-2019.  Stress test was negative for reversible ischemia 3-2019He has a past medical history significant for PAD S/P successful atherectomy and balloon angioplasty proximal and distal SFA, chronic tobacco abuse, hypertension, and dyslipidemia.  The chart and medications were reviewed today. Problems above addressed this visit.      Past Medical History:   Diagnosis Date   • Hyperlipidemia    • Hypertension    • PAD (peripheral artery disease) (CMS/[facility])        Past Surgical History:   Procedure Laterality Date   • ARTERIOGRAM N/A 11/16/2018    Procedure: Right Arteriogram;  Surgeon: Clark Manriquez MD;  Location:  [facility]ATH [facility]AS[facility];  Service: Cardiology   • ARTERIOGRAM N/A 1/3/2019    Procedure: Arteriogram-- LEFT LOWER EXTREMITY;  Surgeon: Clark Manriquez MD;  Location:  [facility]ATH [facility];  Service: Cardiology   • CARDIAC CATHETERIZATION  2011   • CARDIAC CATHETERIZATION N/A 11/16/2018    Procedure: Atherectomy-peripheral;  Surgeon: Clark Manriquez MD;  Location:  [facility]ATH [facility] LOCATION;  Service: Cardiology        Past Social History:  Social History     Socioeconomic History   • Marital status: Single     Spouse name: Not on file   • Number of children: Not on file   • Years of education: Not on file   • Highest education level: Not on file   Tobacco Use   • Smoking status: Current Every Day Smoker     Packs/day: 2.00     Years: 46.00     Pack " years: 92.00     Types: Cigarettes   • Smokeless tobacco: Never Used   Substance and Sexual Activity   • Alcohol use: Yes     Alcohol/week: 6.0 standard drinks     Types: 6 Cans of beer per week   • Drug use: No   • Sexual activity: Defer       Past Family History:  Family History   Problem Relation Age of Onset   • Hypertension Mother    • Heart disease Father 65        CABG x3v 99% blockage   • No Known Problems Sister    • No Known Problems Brother        Review of Systems:   Please see HPI  Constitution: No chills, no rigors, no unexplained weight loss or weight gain  Eyes:  No diplopia, no blurred vision, no loss of vision, conjunctiva is pink and sclera is anicteric  ENT:  No tinnitus, no otorrhea, no epistaxis, no sore throat   Respiratory: No cough, no hemoptysis  Cardiovascular: see HPI  Gastrointestinal: No nausea, no vomiting, no hematemesis, no diarrhea or constipation, no melena  Genitourinary: No frequency of dysuria no hematuria  Integument: No pruritis and  no skin rash  Hematologic / Lymphatic: No excessive bleeding, easy bruising, fatigue, lymphadenopathy and petechiae  Musculoskeletal: No joint pain, joint stiffness, joint swelling, muscle pain, muscle weakness and neck pain  Neurological: No dizziness, headaches, light headedness, seizures and vertigo  Endocrine: No frequent urination and nocturia, temperature intolerance, weight gain, unintended and weight loss, unintended      Current Outpatient Medications   Medication Sig Dispense Refill   • amLODIPine (NORVASC) 10 MG tablet Take 1 tablet by mouth Daily. 30 tablet 3   • aspirin 81 MG chewable tablet Chew 81 mg Daily.     • buPROPion SR (WELLBUTRIN SR) 150 MG 12 hr tablet Take 150 mg by mouth Daily.     • clopidogrel (PLAVIX) 75 MG tablet Take 1 tablet by mouth Daily. 30 tablet 11   • fenofibrate (TRICOR) 145 MG tablet TAKE ONE TABLET BY MOUTH EVERY DAY 30 tablet 3   • gabapentin (NEURONTIN) 800 MG tablet Take 800 mg by mouth 3 (Three) Times a  Day.  0   • HYDROcodone-acetaminophen (NORCO) 7.5-325 MG per tablet Take 7.5 tablets by mouth 2 (Two) Times a Day.  0   • ibuprofen (ADVIL,MOTRIN) 800 MG tablet Take 800 mg by mouth 3 (Three) Times a Day.  2   • metoprolol succinate XL (TOPROL-XL) 50 MG 24 hr tablet Take 1 tablet by mouth Daily. 30 tablet 3   • rosuvastatin (CRESTOR) 10 MG tablet Take 1 tablet by mouth Daily. 90 tablet 1     No current facility-administered medications for this visit.         Allergies   Allergen Reactions   • Lisinopril Cough       Objective:  Vitals:    03/04/20 1551   BP: 173/84   Pulse: 90   SpO2: 91%     Comfortable NAD  PERRL, conjunctiva clear  Neck supple, no JVD or thyromegaly appreciated  S1/S2 RRR, no m/r/g  Lungs CTA B, normal effort, wheezing  Abdomen S/NT/ND (+) BS, no HSM appreciated  Extremities warm, no clubbing, cyanosis, or edema  Normal gait  No visible or palpable skin lesions  A/Ox4, mood and affect appropriate  Pulse exam:    Feet are warm bilateral  Negative edema bilateral  Capillary refill is normal  No evidence of ulceration or color change of the toes  PULSES  Right DP and PT are 1+ and Left DP and PT are 2+    DATA:      Results for orders placed during the hospital encounter of 08/02/19   Adult Transthoracic Echo Complete W/ Cont if Necessary Per Protocol    Narrative · The study is technically difficult for diagnosis.  · Normal left ventricular cavity size and wall thickness noted. All left   ventricular wall segments contract normally.  · Estimated EF appears to be in the range of 61 - 65%.  · Left ventricular diastolic dysfunction (grade I) consistent with   impaired relaxation.  · The aortic valve is not well visualized.  · Mitral valve is not well visualized. The mitral valve is grossly normal   in structure. No mitral valve regurgitation is present. No significant   mitral valve stenosis is present.  · The tricuspid valve is normal. Mild tricuspid valve regurgitation is   present. Estimated right  ventricular systolic pressure from tricuspid   regurgitation is normal (<35 mmHg).  · There is no evidence of pericardial effusion.         Results for orders placed during the hospital encounter of 03/12/19   Stress Test With Myocardial Perfusion    Narrative · A pharmacological stress test was performed using regadenoson  · Findings consistent with a normal ECG stress test.  · Myocardial perfusion imaging indicates a normal myocardial perfusion   study with no evidence of ischemia.  · TID:1.35  · Normal LV cavity size. Normal LV wall motion noted.  · Left ventricular ejection fraction is normal (Calculated EF = 56%).  · Impressions are consistent with a low risk study.         Results for orders placed during the hospital encounter of 03/12/19   Stress Test With Myocardial Perfusion    Narrative · A pharmacological stress test was performed using regadenoson  · Findings consistent with a normal ECG stress test.  · Myocardial perfusion imaging indicates a normal myocardial perfusion   study with no evidence of ischemia.  · TID:1.35  · Normal LV cavity size. Normal LV wall motion noted.  · Left ventricular ejection fraction is normal (Calculated EF = 56%).  · Impressions are consistent with a low risk study.         Results for orders placed during the hospital encounter of 01/03/19   Arteriogram    Narrative PERIPHERAL ARTERIOGRAM / INTERVENTION REPORT     DATE OF PROCEDURE: 01/03/19     INDICATION FOR PROCEDURE: Severe claudication of the left leg. Previous   atherectomy of the right SFA in Nov 2018.         PROCEDURE PERFORMED:   Right femoral artery access with 5 Panamanian sheath  Limited bilateral lower extremity runoff  Atherectomy and Drug Coated balloon of the left SFA       PROCEDURE COMMENTS:  Carlos Carson was brought to the cath lab and placed on the cardiac   catherization table in the postabsortive state. Time out was taken and   patient received moderate sedation. The patient was prepped and draped    according to the cath lab protocol under strict aseptic and sterile   condition. Patient's right femoral artery sight was prepped and draped.   Under fluoroscopic guidance theright femoral artery was punctured using a   micropuncture needle utilizing the modified Seldinger technique. 5 Spanish   sheath was introduced over a wire. After aspirating for blood it was   flushed with heparinized saline.    Right extremity imaging was performed via the sheath. A 4F RIM catheter   was then advanced to the abdominal aorta at the level of L1 and aortogram   was performed. Catheter was then pulled back at the bifurcation at L5 and   engaged in the contralateral MAAME. Left extremity imaging was then   performed. After the procedure was completed the sheath was removed in the   cath lab and hemostasis achieved via manual compression. No complications   occurred. Patient tolerated the procedure well.     Findings:    Right Lower Extremity:   Common  Iliac artery shows a 50-60% stenosis with J-wire hanging up in   this lesion  SFA shows previous atherectomy site wide open with good flow  Popliteal Artery was patent with no disease  Tibio-peroneal trunk patent with no disease  Anterior tibial artery was 100% occluded  Posterior tibial artery was patent  2 Vessel run off present    Left Lower Extremity:  Common iliac artery shows mild to moderate disease and calcific changes  SFA shows prox and distal third with up to 80% stenosis  Tibio-peroneal trunk patent with moderate disease  Anterior tibial artery was initially showing poor filling but was seen to   fill after the intervention on SFA  Posterior tibial artery is patent with no disease  2 Vessel run off present    EBL: 50 ML  No specimens were removed.    INTERVENTION DETAILS:  A 7x45 destination sheath placed  A Command wire 0.014 was used to cross lesions and placed BTP  Atherectomy was performed to both the prox and distal lesions and large   amount of plaque removed  DCB  with Admiral In-Pact 6x80 used    Final Result:  Successful Atherectomy and balloon angioplasty with DCB of the prox and   distal SFA using  Medtronic HawkOne 7F device   Admiral DCB 6x80  With stenosis reduction from 80% to 30% residual.    Plan  Asa and Plavix for 6 months  Pt will need work up for his new onset Dyspnea  He is OK to proceed with Diet control meds.    Clark Manriquez MD       Procedures       Assessment  Shortness of breath with diffuse wheezing bilaterally  Essential hypertension  Dyslipidemia. Statin therapy Crestor along with Tricor  PAD s/p atherectomy and balloon angioplasty proximal and distal SFA      Plan:   No change in medication regimen  Continue EC ASA and Plavix  Proceed with a PFT with and without BD  Follow up in 6 months or sooner if needed    Patient's Body mass index is 39.71 kg/m². BMI is above normal parameters. Recommendations include: exercise counseling and nutrition counseling.         ICD-10-CM ICD-9-CM   1. Shortness of breath R06.02 786.05   2. Tobacco abuse Z72.0 305.1   3. Claudication in peripheral vascular disease (CMS/[facility]) I73.9 443.9   4. ASCVD (arteriosclerotic cardiovascular disease), apparently nonobstructive, clinically stable. I25.10 429.2     440.9   5. Dyslipidemia E78.5 272.4        Thank you for allowing me to participate in the care of Carlos Carson. Feel free to contact me directly with any further questions or concerns.        Director, Cardiac Cath Lab    NARESH Woodard, acting as scribe for Clark Manriquez MD, [facility], [facility].   03/05/20  9:19 AM    I have read and agree the documentation that has been completed regarding this visit.  By signing this record, I attest that the documentation was completed by my physical presence and is an accurate record of the encounter.  Voice recognition / transcription technology used for some documentation in this chart in attempt to mitigate substantial inefficiencies created by this electronic health record technology.   As a result, there may be some typos and.or nonsensical language introduced into the chart that either overlooked in editing/review and/or that I am unable to correct as patient care needs require me to prioritize my attention to bedside patient care rather than electronic documentation. MA

## 2020-03-05 DIAGNOSIS — I10 ESSENTIAL HYPERTENSION: ICD-10-CM

## 2020-03-05 RX ORDER — AMLODIPINE BESYLATE 10 MG/1
10 TABLET ORAL DAILY
Qty: 30 TABLET | Refills: 3 | Status: SHIPPED | OUTPATIENT
Start: 2020-03-05 | End: 2020-05-27

## 2020-03-13 ENCOUNTER — APPOINTMENT (OUTPATIENT)
Dept: RESPIRATORY THERAPY | Facility: HOSPITAL | Age: 55
End: 2020-03-13

## 2020-04-29 ENCOUNTER — APPOINTMENT (OUTPATIENT)
Dept: RESPIRATORY THERAPY | Facility: HOSPITAL | Age: 55
End: 2020-04-29

## 2020-05-01 ENCOUNTER — OFFICE VISIT (OUTPATIENT)
Dept: CARDIOLOGY | Facility: CLINIC | Age: 55
End: 2020-05-01

## 2020-05-01 VITALS — HEIGHT: 73 IN | BODY MASS INDEX: 39.71 KG/M2

## 2020-05-01 DIAGNOSIS — I73.9 PAD (PERIPHERAL ARTERY DISEASE) (HCC): ICD-10-CM

## 2020-05-01 DIAGNOSIS — I10 ESSENTIAL HYPERTENSION: ICD-10-CM

## 2020-05-01 DIAGNOSIS — Z72.0 TOBACCO ABUSE: ICD-10-CM

## 2020-05-01 DIAGNOSIS — I25.10 ASCVD (ARTERIOSCLEROTIC CARDIOVASCULAR DISEASE): Primary | ICD-10-CM

## 2020-05-01 DIAGNOSIS — R06.02 SHORTNESS OF BREATH: ICD-10-CM

## 2020-05-01 DIAGNOSIS — E78.5 DYSLIPIDEMIA: ICD-10-CM

## 2020-05-01 PROCEDURE — 99441 PR PHYS/QHP TELEPHONE EVALUATION 5-10 MIN: CPT | Performed by: NURSE PRACTITIONER

## 2020-05-01 RX ORDER — ROSUVASTATIN CALCIUM 10 MG/1
10 TABLET, COATED ORAL DAILY
Qty: 30 TABLET | Refills: 11 | Status: SHIPPED | OUTPATIENT
Start: 2020-05-01 | End: 2021-02-19 | Stop reason: SDUPTHER

## 2020-05-01 RX ORDER — METOPROLOL SUCCINATE 50 MG/1
50 TABLET, EXTENDED RELEASE ORAL DAILY
Qty: 30 TABLET | Refills: 11 | Status: SHIPPED | OUTPATIENT
Start: 2020-05-01 | End: 2020-06-26 | Stop reason: SDUPTHER

## 2020-05-01 RX ORDER — FENOFIBRATE 145 MG/1
TABLET, COATED ORAL
Qty: 30 TABLET | Refills: 11 | Status: SHIPPED | OUTPATIENT
Start: 2020-05-01 | End: 2020-06-26 | Stop reason: SDUPTHER

## 2020-05-01 NOTE — PROGRESS NOTES
Marta Davis, APRN  Carlos Carson  1965 05/01/2020    Patient Active Problem List   Diagnosis   • Essential hypertension   • Coronary artery disease without angina pectoris, non-hemodynamically significant   • PVD (peripheral vascular disease) (CMS/[facility])   • Dyslipidemia   • Tobacco abuse   • Simple chronic bronchitis (CMS/[facility])   • Obesity (BMI 30-39.9)   • Cough due to ACE inhibitor (Lisinopril)   • Chronic bronchitis (CMS/[facility])   • ASCVD (arteriosclerotic cardiovascular disease), apparently nonobstructive, clinically stable.   • Peripheral arterial disease, involving both lower extremities with bilateral leg claudication.   • Chronic fatigue   • PAD (peripheral artery disease) (CMS/[facility])       Dear Marta Davis, APRN:    Subjective     Chief Complaint   Patient presents with   • Coronary Artery Disease     6 mos   • Shortness of Breath     routine activity, lung test ordered for later this month, Dr. Manriquez   • Med Management     verbal           You have chosen to receive care through a telephone visit. Do you consent to use a telephone visit for your medical care today? Yes    :    Carlos Carson is a 54 y.o. male with a past medical history significant for nonobstructive coronary artery disease, dyslipidemia, hypertension, severe peripheral arterial disease status post arteriogram and intervention of bilateral lower extremities in November 2018 and again in January 2019, COPD, and tobacco abuse. He presents today for routine cardiology follow up via telephone visit. He denies any chest pains, palpitations, dizziness, or lightheadedness. He has purchased an exercise bike and has been increasing his activity. He does have shortness of breath that seems to be slightly worse recently. Dr. Manriquez (interventional cardiologist) has ordered a PFT to be done this month. He has been a smoker for over 40 years and continues to smoke. He recently started Wellbutrin and is trying to quit. He continues to  gain weight and has been following with his PCP for this. Previously TSH was abnormal. BNP was normal. HE denies any edema, orthopnea, or PND.          Allergies   Allergen Reactions   • Lisinopril Cough   :      Current Outpatient Medications:   •  amLODIPine (NORVASC) 10 MG tablet, Take 1 tablet by mouth Daily., Disp: 30 tablet, Rfl: 3  •  aspirin 81 MG chewable tablet, Chew 81 mg Daily., Disp: , Rfl:   •  buPROPion SR (WELLBUTRIN SR) 150 MG 12 hr tablet, Take 150 mg by mouth 2 (Two) Times a Day., Disp: , Rfl:   •  clopidogrel (PLAVIX) 75 MG tablet, Take 1 tablet by mouth Daily., Disp: 30 tablet, Rfl: 11  •  fenofibrate (TRICOR) 145 MG tablet, TAKE ONE TABLET BY MOUTH EVERY DAY, Disp: 30 tablet, Rfl: 11  •  gabapentin (NEURONTIN) 800 MG tablet, Take 800 mg by mouth 3 (Three) Times a Day., Disp: , Rfl: 0  •  HYDROcodone-acetaminophen (NORCO) 7.5-325 MG per tablet, Take 7.5 tablets by mouth 2 (Two) Times a Day., Disp: , Rfl: 0  •  ibuprofen (ADVIL,MOTRIN) 800 MG tablet, Take 800 mg by mouth 3 (Three) Times a Day., Disp: , Rfl: 2  •  metoprolol succinate XL (TOPROL-XL) 50 MG 24 hr tablet, Take 1 tablet by mouth Daily., Disp: 30 tablet, Rfl: 11  •  rosuvastatin (CRESTOR) 10 MG tablet, Take 1 tablet by mouth Daily., Disp: 30 tablet, Rfl: 11      The following portions of the patient's history were reviewed and updated as appropriate: allergies, current medications, past family history, past medical history, past social history, past surgical history and problem list.    Social History     Tobacco Use   • Smoking status: Current Every Day Smoker     Packs/day: 2.00     Years: 46.00     Pack years: 92.00     Types: Cigarettes   • Smokeless tobacco: Never Used   Substance Use Topics   • Alcohol use: Yes     Alcohol/week: 6.0 standard drinks     Types: 6 Cans of beer per week   • Drug use: No       Review of Systems   Constitution: Positive for weight gain. Negative for decreased appetite and malaise/fatigue.  "  Cardiovascular: Negative for chest pain, dyspnea on exertion, irregular heartbeat, leg swelling, near-syncope, orthopnea, palpitations, paroxysmal nocturnal dyspnea and syncope.   Respiratory: Positive for shortness of breath. Negative for cough and wheezing.    Neurological: Negative for dizziness, light-headedness and weakness.       Objective   Vitals:    05/01/20 0947   Height: 185.4 cm (73\")     Body mass index is 39.71 kg/m².        Physical Exam    Lab Results   Component Value Date     06/27/2019    K 4.5 06/27/2019     06/27/2019    CO2 24.2 06/27/2019    BUN 26 (H) 06/27/2019    CREATININE 1.00 06/27/2019    GLUCOSE 77 06/27/2019    CALCIUM 9.6 06/27/2019    AST 32 01/03/2019    ALT 52 (H) 01/03/2019    ALKPHOS 77 01/03/2019      Lab Results   Component Value Date    WBC 8.07 01/03/2019    HGB 16.4 01/03/2019    HCT 46.6 01/03/2019     01/03/2019      Lab Results   Component Value Date    TSH 5.280 (H) 06/27/2019    TRIG 335 (H) 01/03/2019    HDL 39 (L) 01/03/2019     (H) 01/03/2019             Procedures      Assessment/Plan    Diagnosis Plan   1. ASCVD (arteriosclerotic cardiovascular disease), apparently nonobstructive, clinically stable.  metoprolol succinate XL (TOPROL-XL) 50 MG 24 hr tablet   2. Essential hypertension  metoprolol succinate XL (TOPROL-XL) 50 MG 24 hr tablet   3. Shortness of breath     4. Dyslipidemia  rosuvastatin (CRESTOR) 10 MG tablet   5. PAD (peripheral artery disease) (CMS/[facility])     6. Tobacco abuse                  Recommendations:    1. Continue with low dose aspirin, Plavix, metoprolol, Crestor, and amlodipine.    2. I have advised the patient to stop smoking and explained the risks of continued tobacco abuse.     3. He will proceed with PFT as scheduled.    4. Follow up in 6 months or sooner if needed.      This visit has been rescheduled as a phone visit to comply with patient safety concerns in accordance with CDC recommendations. Total time of " discussion was 9 minutes.      Return in about 6 months (around 11/1/2020) for Recheck.    As always, I appreciate very much the opportunity to participate in the cardiovascular care of your patients.      With Best Regards,    ENID Davenport

## 2020-05-27 DIAGNOSIS — I10 ESSENTIAL HYPERTENSION: ICD-10-CM

## 2020-05-27 RX ORDER — AMLODIPINE BESYLATE 10 MG/1
TABLET ORAL
Qty: 30 TABLET | Refills: 3 | Status: SHIPPED | OUTPATIENT
Start: 2020-05-27 | End: 2020-10-12

## 2020-05-29 ENCOUNTER — APPOINTMENT (OUTPATIENT)
Dept: RESPIRATORY THERAPY | Facility: HOSPITAL | Age: 55
End: 2020-05-29

## 2020-06-26 DIAGNOSIS — I10 ESSENTIAL HYPERTENSION: ICD-10-CM

## 2020-06-26 DIAGNOSIS — I25.10 ASCVD (ARTERIOSCLEROTIC CARDIOVASCULAR DISEASE): ICD-10-CM

## 2020-06-26 RX ORDER — FENOFIBRATE 145 MG/1
TABLET, COATED ORAL
Qty: 30 TABLET | Refills: 5 | Status: SHIPPED | OUTPATIENT
Start: 2020-06-26 | End: 2020-12-05 | Stop reason: HOSPADM

## 2020-06-26 RX ORDER — METOPROLOL SUCCINATE 50 MG/1
50 TABLET, EXTENDED RELEASE ORAL DAILY
Qty: 30 TABLET | Refills: 5 | Status: SHIPPED | OUTPATIENT
Start: 2020-06-26 | End: 2020-12-05 | Stop reason: HOSPADM

## 2020-09-02 NOTE — NURSING NOTE
Pt/family given discharge instructions/teaching. Verbalizes understanding. Pt discharged from [facility]. Taken to POV per cath lab staff.    elevated sugar

## 2020-10-09 DIAGNOSIS — I10 ESSENTIAL HYPERTENSION: ICD-10-CM

## 2020-10-12 RX ORDER — AMLODIPINE BESYLATE 10 MG/1
TABLET ORAL
Qty: 30 TABLET | Refills: 3 | Status: SHIPPED | OUTPATIENT
Start: 2020-10-12 | End: 2021-01-19 | Stop reason: ALTCHOICE

## 2020-12-03 ENCOUNTER — OFFICE VISIT (OUTPATIENT)
Dept: CARDIOLOGY | Facility: CLINIC | Age: 55
End: 2020-12-03

## 2020-12-03 ENCOUNTER — HOSPITAL ENCOUNTER (INPATIENT)
Facility: HOSPITAL | Age: 55
LOS: 2 days | Discharge: HOME | End: 2020-12-05
Attending: EMERGENCY MEDICINE | Admitting: INTERNAL MEDICINE

## 2020-12-03 ENCOUNTER — APPOINTMENT (OUTPATIENT)
Dept: GENERAL RADIOLOGY | Facility: HOSPITAL | Age: 55
End: 2020-12-03

## 2020-12-03 VITALS
SYSTOLIC BLOOD PRESSURE: 152 MMHG | BODY MASS INDEX: 39.47 KG/M2 | DIASTOLIC BLOOD PRESSURE: 91 MMHG | HEART RATE: 147 BPM | WEIGHT: 297.8 LBS | RESPIRATION RATE: 16 BRPM | TEMPERATURE: 94.1 F | HEIGHT: 73 IN

## 2020-12-03 DIAGNOSIS — I73.9 PERIPHERAL ARTERIAL DISEASE (HCC): ICD-10-CM

## 2020-12-03 DIAGNOSIS — I25.10 ASCVD (ARTERIOSCLEROTIC CARDIOVASCULAR DISEASE): ICD-10-CM

## 2020-12-03 DIAGNOSIS — E11.9 TYPE 2 DIABETES MELLITUS WITHOUT COMPLICATION, WITHOUT LONG-TERM CURRENT USE OF INSULIN (HCC): ICD-10-CM

## 2020-12-03 DIAGNOSIS — I48.92 ATRIAL FLUTTER WITH RAPID VENTRICULAR RESPONSE (HCC): Primary | ICD-10-CM

## 2020-12-03 DIAGNOSIS — E78.5 DYSLIPIDEMIA: ICD-10-CM

## 2020-12-03 DIAGNOSIS — I10 ESSENTIAL HYPERTENSION: ICD-10-CM

## 2020-12-03 DIAGNOSIS — J42 CHRONIC BRONCHITIS, UNSPECIFIED CHRONIC BRONCHITIS TYPE (HCC): ICD-10-CM

## 2020-12-03 DIAGNOSIS — I48.92 NEW ONSET ATRIAL FLUTTER (HCC): Primary | ICD-10-CM

## 2020-12-03 LAB
ALBUMIN SERPL-MCNC: 4.36 G/DL (ref 3.5–5.2)
ALBUMIN/GLOB SERPL: 1.5 G/DL
ALP SERPL-CCNC: 124 U/L (ref 39–117)
ALT SERPL W P-5'-P-CCNC: 42 U/L (ref 1–41)
ANION GAP SERPL CALCULATED.3IONS-SCNC: 11.1 MMOL/L (ref 5–15)
APTT PPP: 63.6 SECONDS (ref 25.6–35.3)
AST SERPL-CCNC: 35 U/L (ref 1–40)
BASOPHILS # BLD AUTO: 0.12 10*3/MM3 (ref 0–0.2)
BASOPHILS # BLD AUTO: 0.15 10*3/MM3 (ref 0–0.2)
BASOPHILS NFR BLD AUTO: 1.4 % (ref 0–1.5)
BASOPHILS NFR BLD AUTO: 1.7 % (ref 0–1.5)
BILIRUB SERPL-MCNC: 0.3 MG/DL (ref 0–1.2)
BUN SERPL-MCNC: 21 MG/DL (ref 6–20)
BUN/CREAT SERPL: 21.2 (ref 7–25)
CALCIUM SPEC-SCNC: 9.5 MG/DL (ref 8.6–10.5)
CHLORIDE SERPL-SCNC: 93 MMOL/L (ref 98–107)
CO2 SERPL-SCNC: 25.9 MMOL/L (ref 22–29)
CREAT SERPL-MCNC: 0.99 MG/DL (ref 0.76–1.27)
D-LACTATE SERPL-SCNC: 1.8 MMOL/L (ref 0.5–2)
DEPRECATED RDW RBC AUTO: 40.4 FL (ref 37–54)
DEPRECATED RDW RBC AUTO: 40.7 FL (ref 37–54)
EOSINOPHIL # BLD AUTO: 0.28 10*3/MM3 (ref 0–0.4)
EOSINOPHIL # BLD AUTO: 0.3 10*3/MM3 (ref 0–0.4)
EOSINOPHIL NFR BLD AUTO: 3.3 % (ref 0.3–6.2)
EOSINOPHIL NFR BLD AUTO: 3.4 % (ref 0.3–6.2)
ERYTHROCYTE [DISTWIDTH] IN BLOOD BY AUTOMATED COUNT: 12.5 % (ref 12.3–15.4)
ERYTHROCYTE [DISTWIDTH] IN BLOOD BY AUTOMATED COUNT: 12.6 % (ref 12.3–15.4)
FLUAV RNA RESP QL NAA+PROBE: NOT DETECTED
FLUBV RNA RESP QL NAA+PROBE: NOT DETECTED
GFR SERPL CREATININE-BSD FRML MDRD: 78 ML/MIN/1.73
GLOBULIN UR ELPH-MCNC: 2.9 GM/DL
GLUCOSE BLDC GLUCOMTR-MCNC: 288 MG/DL (ref 70–130)
GLUCOSE SERPL-MCNC: 517 MG/DL (ref 65–99)
HCT VFR BLD AUTO: 45.8 % (ref 37.5–51)
HCT VFR BLD AUTO: 46.1 % (ref 37.5–51)
HGB BLD-MCNC: 15.7 G/DL (ref 13–17.7)
HGB BLD-MCNC: 16 G/DL (ref 13–17.7)
HOLD SPECIMEN: NORMAL
HOLD SPECIMEN: NORMAL
IMM GRANULOCYTES # BLD AUTO: 0.04 10*3/MM3 (ref 0–0.05)
IMM GRANULOCYTES # BLD AUTO: 0.06 10*3/MM3 (ref 0–0.05)
IMM GRANULOCYTES NFR BLD AUTO: 0.5 % (ref 0–0.5)
IMM GRANULOCYTES NFR BLD AUTO: 0.7 % (ref 0–0.5)
INR PPP: 1 (ref 0.9–1.1)
LYMPHOCYTES # BLD AUTO: 1.99 10*3/MM3 (ref 0.7–3.1)
LYMPHOCYTES # BLD AUTO: 2.4 10*3/MM3 (ref 0.7–3.1)
LYMPHOCYTES NFR BLD AUTO: 23.7 % (ref 19.6–45.3)
LYMPHOCYTES NFR BLD AUTO: 27.6 % (ref 19.6–45.3)
MCH RBC QN AUTO: 30.4 PG (ref 26.6–33)
MCH RBC QN AUTO: 30.8 PG (ref 26.6–33)
MCHC RBC AUTO-ENTMCNC: 34.3 G/DL (ref 31.5–35.7)
MCHC RBC AUTO-ENTMCNC: 34.7 G/DL (ref 31.5–35.7)
MCV RBC AUTO: 88.6 FL (ref 79–97)
MCV RBC AUTO: 88.8 FL (ref 79–97)
MONOCYTES # BLD AUTO: 0.87 10*3/MM3 (ref 0.1–0.9)
MONOCYTES # BLD AUTO: 0.91 10*3/MM3 (ref 0.1–0.9)
MONOCYTES NFR BLD AUTO: 10.4 % (ref 5–12)
MONOCYTES NFR BLD AUTO: 10.4 % (ref 5–12)
NEUTROPHILS NFR BLD AUTO: 4.91 10*3/MM3 (ref 1.7–7)
NEUTROPHILS NFR BLD AUTO: 5.06 10*3/MM3 (ref 1.7–7)
NEUTROPHILS NFR BLD AUTO: 56.4 % (ref 42.7–76)
NEUTROPHILS NFR BLD AUTO: 60.5 % (ref 42.7–76)
NRBC BLD AUTO-RTO: 0 /100 WBC (ref 0–0.2)
NRBC BLD AUTO-RTO: 0 /100 WBC (ref 0–0.2)
NT-PROBNP SERPL-MCNC: 538.1 PG/ML (ref 0–900)
PLATELET # BLD AUTO: 294 10*3/MM3 (ref 140–450)
PLATELET # BLD AUTO: 316 10*3/MM3 (ref 140–450)
PMV BLD AUTO: 10.2 FL (ref 6–12)
PMV BLD AUTO: 10.2 FL (ref 6–12)
POTASSIUM SERPL-SCNC: 4.8 MMOL/L (ref 3.5–5.2)
PROT SERPL-MCNC: 7.3 G/DL (ref 6–8.5)
PROTHROMBIN TIME: 13 SECONDS (ref 11.9–14.1)
QT INTERVAL: 314 MS
QTC INTERVAL: 468 MS
RBC # BLD AUTO: 5.17 10*6/MM3 (ref 4.14–5.8)
RBC # BLD AUTO: 5.19 10*6/MM3 (ref 4.14–5.8)
SARS-COV-2 RNA RESP QL NAA+PROBE: NOT DETECTED
SODIUM SERPL-SCNC: 130 MMOL/L (ref 136–145)
TROPONIN T SERPL-MCNC: <0.01 NG/ML (ref 0–0.03)
TROPONIN T SERPL-MCNC: <0.01 NG/ML (ref 0–0.03)
WBC # BLD AUTO: 8.38 10*3/MM3 (ref 3.4–10.8)
WBC # BLD AUTO: 8.71 10*3/MM3 (ref 3.4–10.8)
WHOLE BLOOD HOLD SPECIMEN: NORMAL
WHOLE BLOOD HOLD SPECIMEN: NORMAL

## 2020-12-03 PROCEDURE — 80053 COMPREHEN METABOLIC PANEL: CPT | Performed by: EMERGENCY MEDICINE

## 2020-12-03 PROCEDURE — 82962 GLUCOSE BLOOD TEST: CPT

## 2020-12-03 PROCEDURE — 71045 X-RAY EXAM CHEST 1 VIEW: CPT

## 2020-12-03 PROCEDURE — 25010000002 HEPARIN (PORCINE) PER 1000 UNITS: Performed by: STUDENT IN AN ORGANIZED HEALTH CARE EDUCATION/TRAINING PROGRAM

## 2020-12-03 PROCEDURE — 93005 ELECTROCARDIOGRAM TRACING: CPT | Performed by: EMERGENCY MEDICINE

## 2020-12-03 PROCEDURE — 85025 COMPLETE CBC W/AUTO DIFF WBC: CPT | Performed by: STUDENT IN AN ORGANIZED HEALTH CARE EDUCATION/TRAINING PROGRAM

## 2020-12-03 PROCEDURE — 85730 THROMBOPLASTIN TIME PARTIAL: CPT | Performed by: STUDENT IN AN ORGANIZED HEALTH CARE EDUCATION/TRAINING PROGRAM

## 2020-12-03 PROCEDURE — 71045 X-RAY EXAM CHEST 1 VIEW: CPT | Performed by: RADIOLOGY

## 2020-12-03 PROCEDURE — 83605 ASSAY OF LACTIC ACID: CPT | Performed by: EMERGENCY MEDICINE

## 2020-12-03 PROCEDURE — 93005 ELECTROCARDIOGRAM TRACING: CPT | Performed by: PHYSICIAN ASSISTANT

## 2020-12-03 PROCEDURE — 99223 1ST HOSP IP/OBS HIGH 75: CPT | Performed by: INTERNAL MEDICINE

## 2020-12-03 PROCEDURE — 83880 ASSAY OF NATRIURETIC PEPTIDE: CPT | Performed by: EMERGENCY MEDICINE

## 2020-12-03 PROCEDURE — 99214 OFFICE O/P EST MOD 30 MIN: CPT | Performed by: NURSE PRACTITIONER

## 2020-12-03 PROCEDURE — 99285 EMERGENCY DEPT VISIT HI MDM: CPT

## 2020-12-03 PROCEDURE — 85610 PROTHROMBIN TIME: CPT | Performed by: STUDENT IN AN ORGANIZED HEALTH CARE EDUCATION/TRAINING PROGRAM

## 2020-12-03 PROCEDURE — 25010000002 ADENOSINE PER 6 MG

## 2020-12-03 PROCEDURE — 84484 ASSAY OF TROPONIN QUANT: CPT | Performed by: EMERGENCY MEDICINE

## 2020-12-03 PROCEDURE — 87636 SARSCOV2 & INF A&B AMP PRB: CPT | Performed by: STUDENT IN AN ORGANIZED HEALTH CARE EDUCATION/TRAINING PROGRAM

## 2020-12-03 PROCEDURE — 63710000001 INSULIN REGULAR HUMAN PER 5 UNITS: Performed by: EMERGENCY MEDICINE

## 2020-12-03 PROCEDURE — 93000 ELECTROCARDIOGRAM COMPLETE: CPT | Performed by: NURSE PRACTITIONER

## 2020-12-03 PROCEDURE — 25010000002 DIGOXIN PER 500 MCG: Performed by: INTERNAL MEDICINE

## 2020-12-03 PROCEDURE — 25010000002 DIGOXIN PER 500 MCG: Performed by: STUDENT IN AN ORGANIZED HEALTH CARE EDUCATION/TRAINING PROGRAM

## 2020-12-03 PROCEDURE — 93010 ELECTROCARDIOGRAM REPORT: CPT | Performed by: INTERNAL MEDICINE

## 2020-12-03 PROCEDURE — 85025 COMPLETE CBC W/AUTO DIFF WBC: CPT | Performed by: EMERGENCY MEDICINE

## 2020-12-03 RX ORDER — HEPARIN SODIUM 10000 [USP'U]/100ML
7.41 INJECTION, SOLUTION INTRAVENOUS
Status: DISCONTINUED | OUTPATIENT
Start: 2020-12-03 | End: 2020-12-04

## 2020-12-03 RX ORDER — ADENOSINE 3 MG/ML
12 INJECTION, SOLUTION INTRAVENOUS ONCE
Status: COMPLETED | OUTPATIENT
Start: 2020-12-03 | End: 2020-12-03

## 2020-12-03 RX ORDER — SODIUM CHLORIDE 0.9 % (FLUSH) 0.9 %
10 SYRINGE (ML) INJECTION AS NEEDED
Status: DISCONTINUED | OUTPATIENT
Start: 2020-12-03 | End: 2020-12-05 | Stop reason: HOSPADM

## 2020-12-03 RX ORDER — HEPARIN SODIUM 5000 [USP'U]/ML
2500 INJECTION, SOLUTION INTRAVENOUS; SUBCUTANEOUS AS NEEDED
Status: DISCONTINUED | OUTPATIENT
Start: 2020-12-03 | End: 2020-12-05 | Stop reason: HOSPADM

## 2020-12-03 RX ORDER — HEPARIN SODIUM 5000 [USP'U]/ML
5000 INJECTION, SOLUTION INTRAVENOUS; SUBCUTANEOUS ONCE
Status: COMPLETED | OUTPATIENT
Start: 2020-12-03 | End: 2020-12-03

## 2020-12-03 RX ORDER — DIGOXIN 0.25 MG/ML
250 INJECTION INTRAMUSCULAR; INTRAVENOUS ONCE
Status: COMPLETED | OUTPATIENT
Start: 2020-12-03 | End: 2020-12-03

## 2020-12-03 RX ORDER — ADENOSINE 3 MG/ML
INJECTION, SOLUTION INTRAVENOUS
Status: COMPLETED
Start: 2020-12-03 | End: 2020-12-03

## 2020-12-03 RX ORDER — HEPARIN SODIUM 5000 [USP'U]/ML
5000 INJECTION, SOLUTION INTRAVENOUS; SUBCUTANEOUS AS NEEDED
Status: DISCONTINUED | OUTPATIENT
Start: 2020-12-03 | End: 2020-12-05 | Stop reason: HOSPADM

## 2020-12-03 RX ORDER — HYDROCODONE BITARTRATE AND ACETAMINOPHEN 7.5; 325 MG/1; MG/1
1 TABLET ORAL 2 TIMES DAILY PRN
Status: DISCONTINUED | OUTPATIENT
Start: 2020-12-03 | End: 2020-12-05 | Stop reason: HOSPADM

## 2020-12-03 RX ORDER — NICOTINE POLACRILEX 4 MG
15 LOZENGE BUCCAL
Status: DISCONTINUED | OUTPATIENT
Start: 2020-12-03 | End: 2020-12-05 | Stop reason: HOSPADM

## 2020-12-03 RX ORDER — METOPROLOL TARTRATE 5 MG/5ML
5 INJECTION INTRAVENOUS ONCE
Status: COMPLETED | OUTPATIENT
Start: 2020-12-03 | End: 2020-12-03

## 2020-12-03 RX ORDER — SODIUM CHLORIDE 0.9 % (FLUSH) 0.9 %
10 SYRINGE (ML) INJECTION EVERY 12 HOURS SCHEDULED
Status: DISCONTINUED | OUTPATIENT
Start: 2020-12-03 | End: 2020-12-05 | Stop reason: HOSPADM

## 2020-12-03 RX ORDER — DEXTROSE MONOHYDRATE 25 G/50ML
25 INJECTION, SOLUTION INTRAVENOUS
Status: DISCONTINUED | OUTPATIENT
Start: 2020-12-03 | End: 2020-12-05 | Stop reason: HOSPADM

## 2020-12-03 RX ORDER — NICOTINE 21 MG/24HR
1 PATCH, TRANSDERMAL 24 HOURS TRANSDERMAL
Status: DISCONTINUED | OUTPATIENT
Start: 2020-12-03 | End: 2020-12-05 | Stop reason: HOSPADM

## 2020-12-03 RX ORDER — ADENOSINE 3 MG/ML
6 INJECTION, SOLUTION INTRAVENOUS ONCE
Status: COMPLETED | OUTPATIENT
Start: 2020-12-03 | End: 2020-12-03

## 2020-12-03 RX ORDER — GABAPENTIN 400 MG/1
800 CAPSULE ORAL EVERY 8 HOURS SCHEDULED
Status: DISCONTINUED | OUTPATIENT
Start: 2020-12-03 | End: 2020-12-05 | Stop reason: HOSPADM

## 2020-12-03 RX ORDER — ADENOSINE 3 MG/ML
INJECTION, SOLUTION INTRAVENOUS
Status: DISPENSED
Start: 2020-12-03 | End: 2020-12-04

## 2020-12-03 RX ADMIN — HUMAN INSULIN 13 UNITS: 100 INJECTION, SOLUTION SUBCUTANEOUS at 16:55

## 2020-12-03 RX ADMIN — DIGOXIN 250 MCG: 0.25 INJECTION INTRAMUSCULAR; INTRAVENOUS at 23:16

## 2020-12-03 RX ADMIN — SODIUM CHLORIDE 5 MG/HR: 900 INJECTION, SOLUTION INTRAVENOUS at 16:37

## 2020-12-03 RX ADMIN — NICOTINE 1 PATCH: 21 PATCH TRANSDERMAL at 23:15

## 2020-12-03 RX ADMIN — SODIUM CHLORIDE 15 MG/HR: 900 INJECTION, SOLUTION INTRAVENOUS at 22:22

## 2020-12-03 RX ADMIN — GABAPENTIN 800 MG: 400 CAPSULE ORAL at 23:16

## 2020-12-03 RX ADMIN — METOPROLOL TARTRATE 5 MG: 1 INJECTION, SOLUTION INTRAVENOUS at 17:55

## 2020-12-03 RX ADMIN — ADENOSINE 12 MG: 3 INJECTION, SOLUTION INTRAVENOUS at 15:12

## 2020-12-03 RX ADMIN — DIGOXIN 250 MCG: 0.25 INJECTION INTRAMUSCULAR; INTRAVENOUS at 18:47

## 2020-12-03 RX ADMIN — HEPARIN SODIUM 5000 UNITS: 5000 INJECTION INTRAVENOUS; SUBCUTANEOUS at 18:51

## 2020-12-03 RX ADMIN — HEPARIN SODIUM 7.41 UNITS/KG/HR: 10000 INJECTION, SOLUTION INTRAVENOUS at 18:51

## 2020-12-03 RX ADMIN — ADENOSINE 6 MG: 3 INJECTION, SOLUTION INTRAVENOUS at 15:09

## 2020-12-03 NOTE — ED PROVIDER NOTES
Subjective   History of Present Illness         Please see Dr chen notes for details.              Review of Systems    Past Medical History:   Diagnosis Date   • Diabetes (CMS/[facility])    • Hyperlipidemia    • Hypertension    • PAD (peripheral artery disease) (CMS/[facility])        Allergies   Allergen Reactions   • Lisinopril Cough       Past Surgical History:   Procedure Laterality Date   • ARTERIOGRAM N/A 11/16/2018    Procedure: Right Arteriogram;  Surgeon: Clark Manriquez MD;  Location:  [facility];  Service: Cardiology   • ARTERIOGRAM N/A 1/3/2019    Procedure: Arteriogram-- LEFT LOWER EXTREMITY;  Surgeon: Clark Manriquez MD;  Location:  [facility];  Service: Cardiology   • CARDIAC CATHETERIZATION  2011   • CARDIAC CATHETERIZATION N/A 11/16/2018    Procedure: Atherectomy-peripheral;  Surgeon: Clark Manriquez MD;  Location:  [facility];  Service: Cardiology       Family History   Problem Relation Age of Onset   • Hypertension Mother    • Heart disease Father 65        CABG x3v 99% blockage   • No Known Problems Sister    • No Known Problems Brother        Social History     Socioeconomic History   • Marital status: Single     Spouse name: Not on file   • Number of children: Not on file   • Years of education: Not on file   • Highest education level: Not on file   Tobacco Use   • Smoking status: Current Every Day Smoker     Packs/day: 2.00     Years: 46.00     Pack years: 92.00     Types: Cigarettes   • Smokeless tobacco: Never Used   Substance and Sexual Activity   • Alcohol use: Yes     Alcohol/week: 6.0 standard drinks     Types: 6 Cans of beer per week   • Drug use: No   • Sexual activity: Defer           Objective   Physical Exam    Procedures           ED Course  ED Course as of Dec 05 0240   Thu Dec 03, 2020   1804 Discussed the case wit Dr Gonzales, he recommends    [JM]   1818 Endorsed to Dr. Foley at shift change.    [BC]   1836 Taking over the care of the  patient at this time.  Patient arrived emergency department today with tachycardia.  He was found to be in a flutter after initial administration of adenosine.  Multiple times control been unsuccessful including Cardizem bolus and maxed out on Cardizem drip.  1 dose of metoprolol was effective temporarily.  I discussed the case with cardiology who recommended going ahead with 1 dose of digoxin 250 mcg.  He said we could repeat this dose in 1 hour if needed.  The patient is not on anticoagulation therefore he will be started on heparin drip at this time.  Will be admitted to the hospital for further evaluation hospitalist paged at this time    [JM]      ED Course User Index  [BC] Phoenix Louie MD  [] Virgil Foley, DO                                           MDM  Number of Diagnoses or Management Options  New onset atrial flutter (CMS/HCC): new and requires workup  Diagnosis management comments: Patient presents to the ED for new onset a flutter. He was seen primarily by Dr Louie and handed off to me. When I wennt to see the patient he was resting comfortable without any signs of acute distress. He was given multiple medications in attempt to slow his rate.  initially. Dr izaguirre suggested digoxin bolus at 250  Mcg which was given. Heparin started, hospitalist contacted. Patient was admitted for further management.        Amount and/or Complexity of Data Reviewed  Clinical lab tests: reviewed  Tests in the radiology section of CPT®: reviewed  Tests in the medicine section of CPT®: reviewed  Decide to obtain previous medical records or to obtain history from someone other than the patient: yes  Obtain history from someone other than the patient: yes  Review and summarize past medical records: yes  Discuss the patient with other providers: yes  Independent visualization of images, tracings, or specimens: yes    Risk of Complications, Morbidity, and/or Mortality  Presenting problems: high  Diagnostic  procedures: high  Management options: high    Patient Progress  Patient progress: stable      Final diagnoses:   New onset atrial flutter (CMS/HCC)            Virgil Foley,   12/05/20 0244

## 2020-12-03 NOTE — PROGRESS NOTES
Marta Davis, APRN  Carlos Carson  1965 12/03/2020    Patient Active Problem List   Diagnosis   • Essential hypertension   • Coronary artery disease without angina pectoris, non-hemodynamically significant   • PVD (peripheral vascular disease) (CMS/[facility])   • Dyslipidemia   • Tobacco abuse   • Simple chronic bronchitis (CMS/[facility])   • Obesity (BMI 30-39.9)   • Cough due to ACE inhibitor (Lisinopril)   • Chronic bronchitis (CMS/[facility])   • ASCVD (arteriosclerotic cardiovascular disease), apparently nonobstructive, clinically stable.   • Peripheral arterial disease, involving both lower extremities with bilateral leg claudication.   • Chronic fatigue   • PAD (peripheral artery disease) (CMS/[facility])       Dear Marta Davis, APRN:    Subjective     Chief Complaint   Patient presents with   • Coronary Artery Disease     7 mos follow   • Results     recent lab review   • Shortness of Breath     unchanged, relates to smoking   • Med Management     list provided           History of Present Illness:    Carlos Carson is a 55 y.o. male with a history of nonobstructive coronary artery disease, dyslipidemia, hypertension, severe peripheral arterial disease status post arteriogram and intervention of bilateral lower extremities in November 2018 and again in January 2019, COPD, tobacco abuse, and recently diagnosed diabetes mellitus type 2.  He presents today for routine cardiology follow-up.  He reports he was evaluated by his PCP yesterday and reviewed recent lab results.  He was told his triglycerides were over 1000. He also reports he was diagnosed with Diabetes. He believes his hemoglobin A1c was 9. He denies any chest pains or palpitations. He has been dizzy and short of breath. He denies any near syncope or syncope. Denies leg edema orthopnea, or PND.        Allergies   Allergen Reactions   • Lisinopril Cough   :      Current Outpatient Medications:   •  amLODIPine (NORVASC) 10 MG tablet, TAKE 1 TABLET BY  MOUTH EVERY DAY, Disp: 30 tablet, Rfl: 3  •  aspirin 81 MG chewable tablet, Chew 81 mg Daily., Disp: , Rfl:   •  buPROPion SR (WELLBUTRIN SR) 150 MG 12 hr tablet, Take 150 mg by mouth 2 (Two) Times a Day., Disp: , Rfl:   •  clopidogrel (PLAVIX) 75 MG tablet, Take 1 tablet by mouth Daily., Disp: 30 tablet, Rfl: 11  •  fenofibrate (TRICOR) 145 MG tablet, TAKE ONE TABLET BY MOUTH EVERY DAY, Disp: 30 tablet, Rfl: 5  •  gabapentin (NEURONTIN) 800 MG tablet, Take 800 mg by mouth 3 (Three) Times a Day., Disp: , Rfl: 0  •  HYDROcodone-acetaminophen (NORCO) 7.5-325 MG per tablet, Take 7.5 tablets by mouth 2 (Two) Times a Day., Disp: , Rfl: 0  •  ibuprofen (ADVIL,MOTRIN) 800 MG tablet, Take 800 mg by mouth 3 (Three) Times a Day., Disp: , Rfl: 2  •  metoprolol succinate XL (TOPROL-XL) 50 MG 24 hr tablet, Take 1 tablet by mouth Daily., Disp: 30 tablet, Rfl: 5  •  rosuvastatin (CRESTOR) 10 MG tablet, Take 1 tablet by mouth Daily., Disp: 30 tablet, Rfl: 11      The following portions of the patient's history were reviewed and updated as appropriate: allergies, current medications, past family history, past medical history, past social history, past surgical history and problem list.    Social History     Tobacco Use   • Smoking status: Current Every Day Smoker     Packs/day: 2.00     Years: 46.00     Pack years: 92.00     Types: Cigarettes   • Smokeless tobacco: Never Used   Substance Use Topics   • Alcohol use: Yes     Alcohol/week: 6.0 standard drinks     Types: 6 Cans of beer per week   • Drug use: No       Review of Systems   Constitution: Negative for decreased appetite and malaise/fatigue.   Cardiovascular: Negative for chest pain, dyspnea on exertion, irregular heartbeat, leg swelling, near-syncope, orthopnea, palpitations, paroxysmal nocturnal dyspnea and syncope.   Respiratory: Positive for shortness of breath. Negative for cough and wheezing.    Neurological: Positive for dizziness. Negative for light-headedness and  "weakness.       Objective   Vitals:    12/03/20 0925 12/03/20 0926   BP: 152/91    Pulse: (!) 148 (!) 147   Resp: 16    Temp: 94.1 °F (34.5 °C)    Weight: 135 kg (297 lb 12.8 oz)    Height: 185.4 cm (73\")      Body mass index is 39.29 kg/m².        Vitals signs reviewed.   Constitutional:       Appearance: Healthy appearance. Well-developed and not in distress.   HENT:      Head: Normocephalic and atraumatic.   Pulmonary:      Effort: Pulmonary effort is normal.      Breath sounds: Normal breath sounds. No wheezing. No rales.   Cardiovascular:      Tachycardia present. Regular rhythm.      Murmurs: There is no murmur.      . No S3 and S4 gallop.   Edema:     Peripheral edema absent.   Abdominal:      General: Bowel sounds are normal.      Palpations: Abdomen is soft.   Skin:     General: Skin is warm and dry.   Neurological:      Mental Status: Alert, oriented to person, place, and time and oriented to person, place and time.   Psychiatric:         Mood and Affect: Mood normal.         Behavior: Behavior normal.         Lab Results   Component Value Date     06/27/2019    K 4.5 06/27/2019     06/27/2019    CO2 24.2 06/27/2019    BUN 26 (H) 06/27/2019    CREATININE 1.00 06/27/2019    GLUCOSE 77 06/27/2019    CALCIUM 9.6 06/27/2019    AST 32 01/03/2019    ALT 52 (H) 01/03/2019    ALKPHOS 77 01/03/2019      Lab Results   Component Value Date    WBC 8.07 01/03/2019    HGB 16.4 01/03/2019    HCT 46.6 01/03/2019     01/03/2019      Lab Results   Component Value Date    TSH 5.280 (H) 06/27/2019    TRIG 335 (H) 01/03/2019    HDL 39 (L) 01/03/2019     (H) 01/03/2019               ECG 12 Lead    Date/Time: 12/3/2020 9:24 AM  Performed by: Cassi Rushing APRN  Authorized by: Cassi Rushing APRN   Comparison: compared with previous ECG   Rhythm: atrial flutter  BPM: 144                Assessment/Plan    Diagnosis Plan   1. Atrial flutter with rapid ventricular response (CMS/HCC)     2. ASCVD " "(arteriosclerotic cardiovascular disease), apparently nonobstructive, clinically stable.     3. Peripheral arterial disease, involving both lower extremities with bilateral leg claudication.     4. Essential hypertension     5. Dyslipidemia     6. Chronic bronchitis, unspecified chronic bronchitis type (CMS/[facility])     7. Type 2 diabetes mellitus without complication, without long-term current use of insulin (CMS/[facility])                  Recommendations:     I have reviewed his EKG with Dr. Ramos. This reveals new onset atrial flutter with RVR. I have explained this to the patient at length and asked him to go immediately to the emergency room. He states he will go, but he must pay his bills first. I explained to him the risk of syncope, heart attack, stroke, or even death if this is not treated promptly. He voiced understanding and assured me he would go \"this afternoon.\" I again explained the urgency of seeking treatment for this.        As always, I appreciate very much the opportunity to participate in the cardiovascular care of your patients.      With Best Regards,    Cassi Rushing, APRN            "

## 2020-12-04 ENCOUNTER — APPOINTMENT (OUTPATIENT)
Dept: CARDIOLOGY | Facility: HOSPITAL | Age: 55
End: 2020-12-04

## 2020-12-04 LAB
APTT PPP: 25.5 SECONDS (ref 25.6–35.3)
CHOLEST SERPL-MCNC: 141 MG/DL (ref 0–200)
GLUCOSE BLDC GLUCOMTR-MCNC: 203 MG/DL (ref 70–130)
GLUCOSE BLDC GLUCOMTR-MCNC: 218 MG/DL (ref 70–130)
GLUCOSE BLDC GLUCOMTR-MCNC: 336 MG/DL (ref 70–130)
GLUCOSE BLDC GLUCOMTR-MCNC: 461 MG/DL (ref 70–130)
HBA1C MFR BLD: 10.5 % (ref 4.8–5.6)
HDLC SERPL-MCNC: 25 MG/DL (ref 40–60)
LDLC SERPL CALC-MCNC: 21 MG/DL (ref 0–100)
LDLC/HDLC SERPL: -1.3 {RATIO}
MAGNESIUM SERPL-MCNC: 2 MG/DL (ref 1.6–2.6)
TRIGL SERPL-MCNC: 742 MG/DL (ref 0–150)
TROPONIN T SERPL-MCNC: <0.01 NG/ML (ref 0–0.03)
TSH SERPL DL<=0.05 MIU/L-ACNC: 3.59 UIU/ML (ref 0.27–4.2)
VLDLC SERPL-MCNC: 95 MG/DL (ref 5–40)

## 2020-12-04 PROCEDURE — 99233 SBSQ HOSP IP/OBS HIGH 50: CPT | Performed by: INTERNAL MEDICINE

## 2020-12-04 PROCEDURE — 85730 THROMBOPLASTIN TIME PARTIAL: CPT | Performed by: INTERNAL MEDICINE

## 2020-12-04 PROCEDURE — 25010000002 HEPARIN (PORCINE) PER 1000 UNITS: Performed by: INTERNAL MEDICINE

## 2020-12-04 PROCEDURE — 80061 LIPID PANEL: CPT | Performed by: INTERNAL MEDICINE

## 2020-12-04 PROCEDURE — 84443 ASSAY THYROID STIM HORMONE: CPT | Performed by: INTERNAL MEDICINE

## 2020-12-04 PROCEDURE — 83735 ASSAY OF MAGNESIUM: CPT | Performed by: INTERNAL MEDICINE

## 2020-12-04 PROCEDURE — 93306 TTE W/DOPPLER COMPLETE: CPT

## 2020-12-04 PROCEDURE — 63710000001 INSULIN DETEMIR PER 5 UNITS: Performed by: INTERNAL MEDICINE

## 2020-12-04 PROCEDURE — 63710000001 INSULIN ASPART PER 5 UNITS: Performed by: INTERNAL MEDICINE

## 2020-12-04 PROCEDURE — 83036 HEMOGLOBIN GLYCOSYLATED A1C: CPT | Performed by: INTERNAL MEDICINE

## 2020-12-04 PROCEDURE — 93010 ELECTROCARDIOGRAM REPORT: CPT | Performed by: INTERNAL MEDICINE

## 2020-12-04 PROCEDURE — 93306 TTE W/DOPPLER COMPLETE: CPT | Performed by: INTERNAL MEDICINE

## 2020-12-04 PROCEDURE — 25010000002 DIGOXIN PER 500 MCG: Performed by: INTERNAL MEDICINE

## 2020-12-04 PROCEDURE — 99222 1ST HOSP IP/OBS MODERATE 55: CPT | Performed by: SPECIALIST

## 2020-12-04 PROCEDURE — 84484 ASSAY OF TROPONIN QUANT: CPT | Performed by: INTERNAL MEDICINE

## 2020-12-04 PROCEDURE — 93005 ELECTROCARDIOGRAM TRACING: CPT | Performed by: INTERNAL MEDICINE

## 2020-12-04 PROCEDURE — 82962 GLUCOSE BLOOD TEST: CPT

## 2020-12-04 PROCEDURE — 94799 UNLISTED PULMONARY SVC/PX: CPT

## 2020-12-04 RX ORDER — ASPIRIN 81 MG/1
81 TABLET ORAL DAILY
COMMUNITY
End: 2020-12-05 | Stop reason: HOSPADM

## 2020-12-04 RX ORDER — METOPROLOL SUCCINATE 50 MG/1
50 TABLET, EXTENDED RELEASE ORAL DAILY
Status: CANCELLED | OUTPATIENT
Start: 2020-12-04

## 2020-12-04 RX ORDER — LEVOTHYROXINE SODIUM 0.05 MG/1
50 TABLET ORAL DAILY
COMMUNITY

## 2020-12-04 RX ORDER — CLOPIDOGREL BISULFATE 75 MG/1
75 TABLET ORAL DAILY
Status: CANCELLED | OUTPATIENT
Start: 2020-12-04

## 2020-12-04 RX ORDER — GABAPENTIN 400 MG/1
800 CAPSULE ORAL 3 TIMES DAILY PRN
Status: CANCELLED | OUTPATIENT
Start: 2020-12-04

## 2020-12-04 RX ORDER — DIGOXIN 0.25 MG/ML
250 INJECTION INTRAMUSCULAR; INTRAVENOUS ONCE
Status: COMPLETED | OUTPATIENT
Start: 2020-12-04 | End: 2020-12-04

## 2020-12-04 RX ORDER — METOPROLOL TARTRATE 50 MG/1
50 TABLET, FILM COATED ORAL EVERY 12 HOURS SCHEDULED
Status: DISCONTINUED | OUTPATIENT
Start: 2020-12-04 | End: 2020-12-05 | Stop reason: HOSPADM

## 2020-12-04 RX ORDER — HYDROCODONE BITARTRATE AND ACETAMINOPHEN 7.5; 325 MG/1; MG/1
7.5 TABLET ORAL EVERY 8 HOURS PRN
Status: CANCELLED | OUTPATIENT
Start: 2020-12-04

## 2020-12-04 RX ADMIN — APIXABAN 5 MG: 5 TABLET, FILM COATED ORAL at 14:57

## 2020-12-04 RX ADMIN — APIXABAN 5 MG: 5 TABLET, FILM COATED ORAL at 21:50

## 2020-12-04 RX ADMIN — HEPARIN SODIUM 5000 UNITS: 5000 INJECTION INTRAVENOUS; SUBCUTANEOUS at 09:10

## 2020-12-04 RX ADMIN — SODIUM CHLORIDE, PRESERVATIVE FREE 10 ML: 5 INJECTION INTRAVENOUS at 08:08

## 2020-12-04 RX ADMIN — GABAPENTIN 800 MG: 400 CAPSULE ORAL at 06:11

## 2020-12-04 RX ADMIN — GABAPENTIN 800 MG: 400 CAPSULE ORAL at 22:54

## 2020-12-04 RX ADMIN — INSULIN ASPART 14 UNITS: 100 INJECTION, SOLUTION INTRAVENOUS; SUBCUTANEOUS at 17:22

## 2020-12-04 RX ADMIN — GABAPENTIN 800 MG: 400 CAPSULE ORAL at 14:56

## 2020-12-04 RX ADMIN — NICOTINE 1 PATCH: 21 PATCH TRANSDERMAL at 23:05

## 2020-12-04 RX ADMIN — METOPROLOL TARTRATE 50 MG: 50 TABLET, FILM COATED ORAL at 21:50

## 2020-12-04 RX ADMIN — INSULIN ASPART 5 UNITS: 100 INJECTION, SOLUTION INTRAVENOUS; SUBCUTANEOUS at 13:09

## 2020-12-04 RX ADMIN — HYDROCODONE BITARTRATE AND ACETAMINOPHEN 1 TABLET: 7.5; 325 TABLET ORAL at 08:21

## 2020-12-04 RX ADMIN — SODIUM CHLORIDE, PRESERVATIVE FREE 10 ML: 5 INJECTION INTRAVENOUS at 21:51

## 2020-12-04 RX ADMIN — HYDROCODONE BITARTRATE AND ACETAMINOPHEN 1 TABLET: 7.5; 325 TABLET ORAL at 21:50

## 2020-12-04 RX ADMIN — DIGOXIN 250 MCG: 0.25 INJECTION INTRAMUSCULAR; INTRAVENOUS at 09:09

## 2020-12-04 RX ADMIN — INSULIN DETEMIR 10 UNITS: 100 INJECTION, SOLUTION SUBCUTANEOUS at 21:55

## 2020-12-04 RX ADMIN — METOPROLOL TARTRATE 50 MG: 50 TABLET, FILM COATED ORAL at 14:56

## 2020-12-04 RX ADMIN — INSULIN ASPART 5 UNITS: 100 INJECTION, SOLUTION INTRAVENOUS; SUBCUTANEOUS at 08:07

## 2020-12-04 RX ADMIN — SODIUM CHLORIDE 15 MG/HR: 900 INJECTION, SOLUTION INTRAVENOUS at 05:13

## 2020-12-04 NOTE — PHARMACY PATIENT ASSISTANCE
Pharmacy was consulted to evaluate patient co-pay for novel anticoagulants. According to the patients insurance, the patient would have a $8.95 co-pay for either Xarelto, Eliquis, or Pradaxa. No issues identified at this time.       Thank you,    Giuliana Valle. Gi, PharmD  12/04/20  13:24 EST

## 2020-12-04 NOTE — ED NOTES
Pt transported to PCU at this time by ER nurse and ER tech on [employer] monitor. NADN. VSS. All patient belongings taken with patient. Family updated on visitation hours. IV infusing without difficulty.      Jesse Merchant RN  12/03/20 2002

## 2020-12-04 NOTE — PLAN OF CARE
Goal Outcome Evaluation:  Plan of Care Reviewed With: patient  Progress: improving  Outcome Summary: VSS. RA. Afebrile. Patient converted to NSR about 1300 today. Patient Cardizem and Heparin was dc'd on placed on Metoprolol and Eliquis. UOP adequate. Will continue to monitor patient status.

## 2020-12-04 NOTE — PLAN OF CARE
Goal Outcome Evaluation:      Pt. Admitted for atrial flutter, other vital signs stable. Pt. Afebrile and doing well on room air. Has been resting in bed throughout the night. No complaints at this time. Call light within reach. Will continue to monitor.

## 2020-12-04 NOTE — PROGRESS NOTES
Patient remains in Atrial flutter with RVR; heart rate in the 120s-130s; will give another dose of IV digoxin, 250 mcg x1.

## 2020-12-04 NOTE — H&P
"Hospitalist History and Physical    Patient Identification:  Name: Carlos Carson  Age/Sex: 55 y.o. male  :  1965  MRN: [id number]        Admit Date: 12/3/2020   Primary Care Physician: Marta Davis APRN    Chief Complaint   Patient presents with   • Abnormal ECG       History of Present Illness  Patient is a 55 y.o. male presents with the following:      The patient is a 55-year-old male with past medical history significant for nonobstructive coronary artery disease, diabetes mellitus, dyslipidemia, hypertension, severe peripheral arterial disease status post intervention of bilateral lower extremities in 2018 and 2019, COPD, tobacco abuse and alcohol use who presents to the emergency department today complaining of shortness of air and dizziness.    Patient was seen today by Cardiology for recently diagnosed hypertriglyceridemia; he states that his triglycerides were 1800. He states that he does not have a diagnosis of diabetes although Cardiology noted his A1c was 9%. He states that sometimes his doctor says he does and other times he does not.     According to their note, the patient reported dizziness and shortness of breath.  Patient was found to be in new onset atrial flutter with rapid ventricular response.  Patient was encouraged to report to the emergency department.    The patient reports a 2-3 month history of dyspnea; at rest and with exertion. He reports, \"well I've smoked for 46 or 47 years.\" He also reports dizziness for 1 month that he states occurs with standing. No chest pain or palpitations. No syncope.     In the emergency department, patient was found to be in atrial flutter after he received adenosine.  Patient also received an IV Cardizem bolus, 1 dose of IV metoprolol in addition to a one-time dose of IV digoxin.  Patient was started on a Cardizem infusion which was maxed out at 15 mg/hour.  Patient was also started on heparin infusion.    In the emergency " department, the patient's CMP revealed a glucose of 517, sodium falsely low at 130 and chloride of 93.  Patient had 2 negative troponin T levels.  proBNP was normal at 538.1.  CMP was unremarkable and revealed a white blood cell count of 8.38, hemoglobin 15.7, hematocrit 45.8 and platelets 294.  Portable chest x-ray revealed coarsened interstitial markings bilaterally and cardiomegaly.    Patient has been admitted to the progressive care unit for further evaluation and management.    Past History:  Past Medical History:   Diagnosis Date   • Diabetes (CMS/[facility])    • Hyperlipidemia    • Hypertension    • PAD (peripheral artery disease) (CMS/[facility])      Past Surgical History:   Procedure Laterality Date   • ARTERIOGRAM N/A 11/16/2018    Procedure: Right Arteriogram;  Surgeon: Clark Manriquez MD;  Location:  [facility] CATH INVASIVE LOCATION;  Service: Cardiology   • ARTERIOGRAM N/A 1/3/2019    Procedure: Arteriogram-- LEFT LOWER EXTREMITY;  Surgeon: Clark Manriquez MD;  Location:  [facility] CATH INVASIVE [facility];  Service: Cardiology   • CARDIAC CATHETERIZATION  2011   • CARDIAC CATHETERIZATION N/A 11/16/2018    Procedure: Atherectomy-peripheral;  Surgeon: Clark Manriquez MD;  Location:  [facility] CATH INVASIVE LOCATION;  Service: Cardiology     Family History   Problem Relation Age of Onset   • Hypertension Mother    • Heart disease Father 65        CABG x3v 99% blockage   • No Known Problems Sister    • No Known Problems Brother      Social History     Tobacco Use   • Smoking status: Current Every Day Smoker     Packs/day: 2.00     Years: 46.00     Pack years: 92.00     Types: Cigarettes   • Smokeless tobacco: Never Used   Substance Use Topics   • Alcohol use: Yes     Alcohol/week: 6.0 standard drinks     Types: 6 Cans of beer per week   • Drug use: No     Medications Prior to Admission   Medication Sig Dispense Refill Last Dose   • amLODIPine (NORVASC) 10 MG tablet TAKE 1 TABLET BY MOUTH EVERY DAY 30 tablet 3    • aspirin 81 MG  chewable tablet Chew 81 mg Daily.      • buPROPion SR (WELLBUTRIN SR) 150 MG 12 hr tablet Take 150 mg by mouth 2 (Two) Times a Day.      • clopidogrel (PLAVIX) 75 MG tablet Take 1 tablet by mouth Daily. 30 tablet 11    • fenofibrate (TRICOR) 145 MG tablet TAKE ONE TABLET BY MOUTH EVERY DAY 30 tablet 5    • gabapentin (NEURONTIN) 800 MG tablet Take 800 mg by mouth 3 (Three) Times a Day.  0    • HYDROcodone-acetaminophen (NORCO) 7.5-325 MG per tablet Take 7.5 tablets by mouth 2 (Two) Times a Day.  0    • ibuprofen (ADVIL,MOTRIN) 800 MG tablet Take 800 mg by mouth 3 (Three) Times a Day.  2    • metoprolol succinate XL (TOPROL-XL) 50 MG 24 hr tablet Take 1 tablet by mouth Daily. 30 tablet 5    • rosuvastatin (CRESTOR) 10 MG tablet Take 1 tablet by mouth Daily. 30 tablet 11      Allergies: Lisinopril    Review of Systems:  Review of Systems   Constitutional: Negative for chills, diaphoresis and fever.   HENT: Negative for hearing loss, tinnitus and trouble swallowing.    Eyes: Negative for photophobia, discharge and visual disturbance.   Respiratory: Positive for shortness of breath. Negative for cough and wheezing.    Cardiovascular: Negative for chest pain, palpitations and leg swelling.   Gastrointestinal: Negative for abdominal pain, constipation, diarrhea, nausea and vomiting.   Endocrine: Negative for polydipsia, polyphagia and polyuria.   Genitourinary: Negative for dysuria, frequency and hematuria.   Musculoskeletal: Negative for gait problem, myalgias and neck pain.   Skin: Negative for rash and wound.   Neurological: Positive for dizziness. Negative for tremors, seizures, syncope, weakness and light-headedness.   Hematological: Does not bruise/bleed easily.   Psychiatric/Behavioral: Negative for confusion, hallucinations and suicidal ideas.      Vital Signs  Temp:  [94.1 °F (34.5 °C)-98.8 °F (37.1 °C)] 98.8 °F (37.1 °C)  Heart Rate:  [] 95  Resp:  [16-18] 18  BP: (101-152)/() 115/89  Body mass index  is 39.18 kg/m².    Physical Exam:  Physical Exam  Constitutional:       General: He is not in acute distress.     Appearance: He is well-developed.   HENT:      Head: Normocephalic and atraumatic.   Eyes:      Conjunctiva/sclera: Conjunctivae normal.      Pupils: Pupils are equal, round, and reactive to light.   Neck:      Musculoskeletal: Neck supple.      Vascular: No carotid bruit or JVD.      Trachea: No tracheal deviation.   Cardiovascular:      Rate and Rhythm: Regular rhythm. Tachycardia present.      Pulses:           Posterior tibial pulses are 2+ on the right side and 2+ on the left side.      Heart sounds: No murmur. No friction rub. No gallop.       Comments: No significant lower extremity sadia  Pulmonary:      Effort: No respiratory distress.      Breath sounds: Decreased breath sounds and wheezing (bilateral) present. No rales.   Abdominal:      General: Bowel sounds are normal. There is no distension.      Palpations: Abdomen is soft.      Tenderness: There is no abdominal tenderness. There is no guarding.   Musculoskeletal: Normal range of motion.         General: No tenderness.   Skin:     General: Skin is warm and dry.      Findings: No erythema or rash.   Neurological:      Mental Status: He is alert and oriented to person, place, and time.      Cranial Nerves: No cranial nerve deficit.         Results Review:    Results from last 7 days   Lab Units 12/03/20  1906 12/03/20  1445   WBC 10*3/mm3 8.71 8.38   HEMOGLOBIN g/dL 16.0 15.7   HEMATOCRIT % 46.1 45.8   PLATELETS 10*3/mm3 316 294     Results from last 7 days   Lab Units 12/03/20  1445   SODIUM mmol/L 130*   POTASSIUM mmol/L 4.8   CHLORIDE mmol/L 93*   CO2 mmol/L 25.9   BUN mg/dL 21*   CREATININE mg/dL 0.99   CALCIUM mg/dL 9.5   GLUCOSE mg/dL 517*     Results from last 7 days   Lab Units 12/03/20  1445   BILIRUBIN mg/dL 0.3   ALK PHOS U/L 124*   AST (SGOT) U/L 35   ALT (SGPT) U/L 42*             Results from last 7 days   Lab Units  12/03/20  1655 12/03/20  1445   TROPONIN T ng/mL <0.010 <0.010     Results from last 7 days   Lab Units 12/03/20  1906   INR  1.00       Imaging:    I have personally reviewed the EKG; atrial flutter with rapid response    Chest x-ray images have been reviewed and per my view the patient does not have any effusions or infiltrates.  Patient with cardiomegaly noted.    Imaging Results (Most Recent)     Procedure Component Value Units Date/Time    XR Chest 1 View [[id number]] Collected: 12/03/20 1649     Updated: 12/03/20 1652    Narrative:      EXAM:    XR Chest, 1 View     CLINICAL HISTORY:    Tachycardia     TECHNIQUE:    Frontal view of the chest.     COMPARISON:    No relevant prior studies available.     FINDINGS:    LUNGS:  Coarsened interstitial markings are noted throughout the  lungs.    PLEURAL SPACE:  Unremarkable.  No pneumothorax.    HEART:  Cardiomegaly.    MEDIASTINUM:  Unremarkable.    BONES/JOINTS:  Unremarkable.       Impression:      1.  Coarsened interstitial markings are noted throughout the lungs.  2.  Cardiomegaly.     This report was finalized on 12/3/2020 4:50 PM by Dr. Jose Rodriguez MD.             Assessment/Plan       Atrial flutter (CMS/HCC)    -Atrial flutter with rapid ventricular response, present upon admission  -Diabetes mellitus with hyperglycemia upon admission  -Pseudohyponatremia secondary to hyperglycemia; corrected sodium is 140  -Central hypertension is currently uncontrolled  -Tobacco abuse  -Alcohol use  -Peripheral arterial disease  -Nonobstructive coronary artery disease  -History of congestive heart failure with diastolic dysfunction; most recent echocardiogram October 2019  -Obesity  -Hypertriglyceridemia    Patient has been admitted to the progressive care unit.  Continue with his Cardizem and heparin infusions.  We will continue to monitor his heart rate and consider an additional dose of IV digoxin.  We will obtain magnesium level and a TSH.  Cardiology will be  consulted.  Patient to be nothing by mouth after midnight for possible cardioversion.    We will obtain a hemoglobin A1c level.  Patient has been started on moderate dose sliding scale insulin as needed.    Patient will receive a nicotine patch.  Patient counseled regarding tobacco cessation.    Patient does not appear to be volume overloaded.  Repeat echocardiogram has been ordered as noted above.  We will update patient's fasting lipid panel in our system.    DVT prophylaxis: Heparin infusion    Estimated Length of Stay: > 2 MNs    I discussed the patients findings and my recommendations with patient and nursing staff      Sanaz Underwood DO  12/03/20  20:43 EST

## 2020-12-04 NOTE — CONSULTS
Date of Admit: 12/3/2020  Date of Consult: 12/04/20  No ref. provider found        Atrial flutter (CMS/[facility])      Assessment      1. New onset atrial flutter with RVR, CHADS-VASc score of at least 3, on IV heparin and IV Cardizem  2. ASCVD, history of nonobstructive coronary artery disease  3. Diabetes mellitus type 2  4. Essential hypertension, controlled   5. Peripheral arterial disease status post atherectomy and balloon angioplasty to right SFA in November 2018  6. Tobacco abuse    Recommendations     1. Patient's not rate controlled we discussed possible KAREN cardioversion versus rate control and cardioversion rate later date we will try to start him on p.o. medications for rate control  2. The patient had typical atrial flutter he had been into the from radiofrequency ablation it in the future date  3. We will start him on anticoagulation with Eliquis if the cost is acceptable  4. Blood pressure is controlled continue current management  5. Advised to quit smoking  6. Patient probably has sleep apnea he will require sleep study as an outpatient          Reason for consultation: New onset atrial flutter with RVR    Subjective       Subjective     History of Present Illness    Carlos Carson is a 55-year-old male with a past medical history significant for nonobstructive coronary artery disease, diabetes mellitus, dyslipidemia, hypertension, severe peripheral arterial disease, COPD and tobacco abuse.  Patient presented to the ER with complaints of shortness of breath and dizziness.  He was seen by ENID Davenport with cardiology yesterday and was found to be in new onset atrial flutter with RVR and it was recommended he come to the ER for further evaluation. Patient states that for the last 2-3 months he has had worsening shortness of breath and dizziness. He also has had worsening fatigue. He denies any recent chest pains, syncope or palpitations. He has no history of atrial flutter. He denies any bleeding  problems. He has a history of non-obstructive coronary artery disease per his report.     Cardiac risk factors:arteriosclerotic heart disease, hypertension and Sedentary life style    Last Echo: 10/22/2019  · The study is technically difficult for diagnosis.  · Normal left ventricular cavity size and wall thickness noted. All left ventricular wall segments contract normally.  · Estimated EF appears to be in the range of 61 - 65%.  · Left ventricular diastolic dysfunction (grade I) consistent with impaired relaxation.  · The aortic valve is not well visualized.  · Mitral valve is not well visualized. The mitral valve is grossly normal in structure. No mitral valve regurgitation is present. No significant mitral valve stenosis is present.  · The tricuspid valve is normal. Mild tricuspid valve regurgitation is present. Estimated right ventricular systolic pressure from tricuspid regurgitation is normal (<35 mmHg).  · There is no evidence of pericardial effusion.    Last Stress: 6/13/2019  · A pharmacological stress test was performed using regadenoson  · Findings consistent with a normal ECG stress test.  · Myocardial perfusion imaging indicates a normal myocardial perfusion study with no evidence of ischemia.  · TID:1.35  · Normal LV cavity size. Normal LV wall motion noted.  · Left ventricular ejection fraction is normal (Calculated EF = 56%).  · Impressions are consistent with a low risk study.     Past Medical History:   Diagnosis Date   • Diabetes (CMS/[facility])    • Hyperlipidemia    • Hypertension    • PAD (peripheral artery disease) (CMS/[facility])      Past Surgical History:   Procedure Laterality Date   • ARTERIOGRAM N/A 11/16/2018    Procedure: Right Arteriogram;  Surgeon: Clark Manriquez MD;  Location:  [facility] CATH INVASIVE LOCATION;  Service: Cardiology   • ARTERIOGRAM N/A 1/3/2019    Procedure: Arteriogram-- LEFT LOWER EXTREMITY;  Surgeon: Clark Manriquez MD;  Location:  [facility] CATH INVASIVE [facility];  Service:  Cardiology   • CARDIAC CATHETERIZATION  2011   • CARDIAC CATHETERIZATION N/A 11/16/2018    Procedure: Atherectomy-peripheral;  Surgeon: Clark Manriquez MD;  Location: [facility] CATH INVASIVE LOC[facility];  Service: Cardiology     Family History   Problem Relation Age of Onset   • Hypertension Mother    • Heart disease Father 65        CABG x3v 99% blockage   • No Known Problems Sister    • No Known Problems Brother      Social History     Tobacco Use   • Smoking status: Current Every Day Smoker     Packs/day: 2.00     Years: 46.00     Pack years: 92.00     Types: Cigarettes   • Smokeless tobacco: Never Used   Substance Use Topics   • Alcohol use: Yes     Alcohol/week: 6.0 standard drinks     Types: 6 Cans of beer per week   • Drug use: No     Medications Prior to Admission   Medication Sig Dispense Refill Last Dose   • amLODIPine (NORVASC) 10 MG tablet TAKE 1 TABLET BY MOUTH EVERY DAY 30 tablet 3    • aspirin 81 MG chewable tablet Chew 81 mg Daily.      • buPROPion SR (WELLBUTRIN SR) 150 MG 12 hr tablet Take 150 mg by mouth 2 (Two) Times a Day.      • clopidogrel (PLAVIX) 75 MG tablet Take 1 tablet by mouth Daily. 30 tablet 11    • fenofibrate (TRICOR) 145 MG tablet TAKE ONE TABLET BY MOUTH EVERY DAY 30 tablet 5    • gabapentin (NEURONTIN) 800 MG tablet Take 800 mg by mouth 3 (Three) Times a Day.  0    • HYDROcodone-acetaminophen (NORCO) 7.5-325 MG per tablet Take 7.5 tablets by mouth 2 (Two) Times a Day.  0    • ibuprofen (ADVIL,MOTRIN) 800 MG tablet Take 800 mg by mouth 3 (Three) Times a Day.  2    • metoprolol succinate XL (TOPROL-XL) 50 MG 24 hr tablet Take 1 tablet by mouth Daily. 30 tablet 5    • rosuvastatin (CRESTOR) 10 MG tablet Take 1 tablet by mouth Daily. 30 tablet 11      Allergies:  Lisinopril    Review of Systems   Constitutional: Positive for fatigue. Negative for chills, diaphoresis and fever.   HENT: Negative for congestion and trouble swallowing.    Eyes: Negative for photophobia and visual disturbance.    Respiratory: Positive for shortness of breath. Negative for chest tightness and wheezing.    Cardiovascular: Negative for chest pain, palpitations and leg swelling.   Gastrointestinal: Negative for abdominal pain, nausea and vomiting.   Endocrine: Negative for polyphagia and polyuria.   Genitourinary: Negative for dysuria and hematuria.   Musculoskeletal: Negative for neck pain and neck stiffness.   Skin: Negative for rash and wound.   Allergic/Immunologic: Negative for food allergies and immunocompromised state.   Neurological: Positive for dizziness. Negative for syncope, weakness and light-headedness.   Hematological: Negative for adenopathy. Does not bruise/bleed easily.   Psychiatric/Behavioral: Negative for confusion and suicidal ideas.       Objective       Objective      Vital Signs  Temp:  [97.5 °F (36.4 °C)-98.8 °F (37.1 °C)] 97.6 °F (36.4 °C)  Heart Rate:  [] 149  Resp:  [14-18] 16  BP: (101-144)/() 114/91       Vital Signs (last 72 hrs)       12/01 0700  -  12/02 0659 12/02 0700  -  12/03 0659 12/03 0700  -  12/04 0659 12/04 0700  -  12/04 1011   Most Recent    Temp (°F)     97.5 -  98.8      97.6     97.6 (36.4)    Heart Rate     95 -  142      149     149    Resp     14 -  18       16    BP     101/90 -  144/99      114/91     114/91    SpO2 (%)     90 -  98      93     93        Body mass index is 39.46 kg/m².    Intake/Output Summary (Last 24 hours) at 12/4/2020 1011  Last data filed at 12/4/2020 0811  Gross per 24 hour   Intake 318.19 ml   Output 900 ml   Net -581.81 ml     Physical Exam  Constitutional:       General: He is not in acute distress.     Appearance: Normal appearance. He is well-developed.   HENT:      Head: Normocephalic and atraumatic.      Mouth/Throat:      Mouth: Mucous membranes are moist.   Eyes:      Extraocular Movements: Extraocular movements intact.      Pupils: Pupils are equal, round, and reactive to light.   Neck:      Musculoskeletal: Normal range of motion  and neck supple.      Vascular: No JVD.   Cardiovascular:      Rate and Rhythm: Tachycardia present. Rhythm irregular.      Heart sounds: Normal heart sounds. No murmur. No S3 or S4 sounds.    Pulmonary:      Effort: Pulmonary effort is normal. No respiratory distress.      Breath sounds: Normal breath sounds. No wheezing.   Abdominal:      General: Bowel sounds are normal. There is no distension.      Palpations: Abdomen is soft. There is no hepatomegaly.      Tenderness: There is no abdominal tenderness.   Musculoskeletal: Normal range of motion.   Skin:     General: Skin is warm and dry.      Coloration: Skin is not jaundiced or pale.   Neurological:      General: No focal deficit present.      Mental Status: He is alert and oriented to person, place, and time. Mental status is at baseline.   Psychiatric:         Mood and Affect: Mood normal.         Behavior: Behavior normal.         Thought Content: Thought content normal.         Judgment: Judgment normal.           Results review     Results Review:    I reviewed the patient's new clinical results.  Results from last 7 days   Lab Units 12/04/20  0218 12/03/20  1655 12/03/20  1445   TROPONIN T ng/mL <0.010 <0.010 <0.010     Results from last 7 days   Lab Units 12/03/20  1906 12/03/20  1445   WBC 10*3/mm3 8.71 8.38   HEMOGLOBIN g/dL 16.0 15.7   PLATELETS 10*3/mm3 316 294     Results from last 7 days   Lab Units 12/03/20  1445   SODIUM mmol/L 130*   POTASSIUM mmol/L 4.8   CHLORIDE mmol/L 93*   CO2 mmol/L 25.9   BUN mg/dL 21*   CREATININE mg/dL 0.99   CALCIUM mg/dL 9.5   GLUCOSE mg/dL 517*   ALT (SGPT) U/L 42*   AST (SGOT) U/L 35     Lab Results   Component Value Date    INR 1.00 12/03/2020     Lab Results   Component Value Date    MG 2.0 12/04/2020     Lab Results   Component Value Date    TSH 5.280 (H) 06/27/2019    TRIG 742 (H) 12/04/2020    HDL 25 (L) 12/04/2020    LDL 21 12/04/2020      Lab Results   Component Value Date    PROBNP 538.1 12/03/2020    PROBNP  150.9 06/27/2019       ECG  ECG/EMG Results (last 24 hours)     Procedure Component Value Units Date/Time    ECG 12 Lead [[id number]] Collected: 12/03/20 1433     Updated: 12/03/20 1847     QT Interval 314 ms      QTC Interval 468 ms     Narrative:      Test Reason : tachycardia  Blood Pressure : **/** mmHG  Vent. Rate : 134 BPM     Atrial Rate : 268 BPM     P-R Int : 000 ms          QRS Dur : 080 ms      QT Int : 314 ms       P-R-T Axes : 263 040 -19 degrees     QTc Int : 468 ms    Atrial flutter with 2:1 AV conduction  ST & T wave abnormality, consider inferior ischemia  Abnormal ECG  No previous ECGs available  Confirmed by Mohit Gonzales ([id number]) on 12/3/2020 6:47:22 PM    Referred By:  CURD           Confirmed By:Mohit Gonzales            Imaging Results (Last 72 Hours)     Procedure Component Value Units Date/Time    XR Chest 1 View [[id number]] Collected: 12/03/20 1649     Updated: 12/03/20 1652    Narrative:      EXAM:    XR Chest, 1 View     CLINICAL HISTORY:    Tachycardia     TECHNIQUE:    Frontal view of the chest.     COMPARISON:    No relevant prior studies available.     FINDINGS:    LUNGS:  Coarsened interstitial markings are noted throughout the  lungs.    PLEURAL SPACE:  Unremarkable.  No pneumothorax.    HEART:  Cardiomegaly.    MEDIASTINUM:  Unremarkable.    BONES/JOINTS:  Unremarkable.       Impression:      1.  Coarsened interstitial markings are noted throughout the lungs.  2.  Cardiomegaly.     This report was finalized on 12/3/2020 4:50 PM by Dr. Jose Rodriguez MD.             I have discussed my impression and recommendations with the patient and family.    Thank you very much for asking us to be involved in this patient's care.  We will follow along with you.      ENID Mayfield , acting as scribe for Dr. Janna Ramos MD FACC  12/04/20  10:11 EST  I, Janna Ramos MD, [facility], performed the services described in this documentation as documented by the above-named individual under my  supervision and made necessary changes in the note is both accurate and complete  Please note that portions of this note were completed with a voice recognition program.

## 2020-12-04 NOTE — SIGNIFICANT NOTE
Patient converted to normal sinus rhythm spontaneously. KAREN/Cardioversion cancelled. Will start him on metoprolol and eliquis.

## 2020-12-04 NOTE — PROGRESS NOTES
[facility] HOSPITALIST PROGRESS NOTE     Patient Identification:  Name:  Carlos Carson  Age:  55 y.o.  Sex:  male  :  1965  MRN:  [id number]  Visit Number:  [id number]  ROOM: [address]     Primary Care Provider:  Marta Davis APRN    Length of stay in inpatient status:  1    Subjective     Chief Compliant:    Chief Complaint   Patient presents with   • Abnormal ECG       History of Presenting Illness:    Nurse reported patient was threatening to leave AMA, made her remove the IV on his left arm and was refusing another IV. He no longer had dilatiazem gtt going and HR was in 150's. Remains asymptomatic. Normotensive. Patient reports he was very agitated and anxious as he hates hospitals and needles. I was able to calm patient down and he was agreeable to stay and get IV in ipsilateral arm as other IV.     ROS:  Otherwise 10 point ROS negative other than documented above in HPI.     Objective     Current Hospital Meds:digoxin, 250 mcg, Intravenous, Once  gabapentin, 800 mg, Oral, Q8H  insulin aspart, 0-14 Units, Subcutaneous, TID AC  nicotine, 1 patch, Transdermal, Q24H  sodium chloride, 10 mL, Intravenous, Q12H    dilTIAZem, 5-15 mg/hr, Last Rate: 15 mg/hr (20 2213)  heparin (porcine), 7.41 Units/kg/hr, Last Rate: 7.41 Units/kg/hr (20 4361)        Current Antimicrobial Therapy:  Anti-Infectives (From admission, onward)    None        Current Diuretic Therapy:  Diuretics (From admission, onward)    None        ----------------------------------------------------------------------------------------------------------------------  Vital Signs:  Temp:  [94.1 °F (34.5 °C)-98.8 °F (37.1 °C)] 97.6 °F (36.4 °C)  Heart Rate:  [] 102  Resp:  [14-18] 16  BP: (101-152)/() 143/95  SpO2:  [90 %-98 %] 93 %  on   ;   Device (Oxygen Therapy): room air  Body mass index is 39.46 kg/m².    Wt Readings from Last 3 Encounters:   20 136 kg (299 lb 1.6 oz)   20 135 kg (297  lb 12.8 oz)   03/04/20 (!) 137 kg (301 lb)     Intake & Output (last 3 days)       12/01 0701 - 12/02 0700 12/02 0701 - 12/03 0700 12/03 0701 - 12/04 0700 12/04 0701 - 12/05 0700    I.V. (mL/kg)   318.2 (2.3)     Total Intake(mL/kg)   318.2 (2.3)     Urine (mL/kg/hr)   500 400 (1.6)    Total Output   500 400    Net   -181.8 -400                NPO Diet  ----------------------------------------------------------------------------------------------------------------------  Physical exam:  Constitutional:  Well-developed and well-nourished.  No respiratory distress.      HENT:  Head:  Normocephalic and atraumatic.  Mouth:  Moist mucous membranes.    Eyes:  Conjunctivae and EOM are normal. No scleral icterus.    Neck:  Neck supple.  No JVD present.    Cardiovascular:  Normal rate, regular rhythm and normal heart sounds with no murmur.  Pulmonary/Chest:  No respiratory distress, no wheezes, no crackles, with normal breath sounds and good air movement.  Abdominal:  Soft.  Bowel sounds are normal.  No distension and no tenderness.   Musculoskeletal:  No edema, no tenderness, and no deformity.  No red or swollen joints anywhere.    Neurological:  Alert and oriented to person, place, and time.  No cranial nerve deficit.  No tongue deviation.  No facial droop.  No slurred speech.   Skin:  Skin is warm and dry. No rash noted. No pallor.   Peripheral vascular:  Pulses in all 4 extremities with no clubbing, no cyanosis, no edema.  ----------------------------------------------------------------------------------------------------------------------  Tele:    ----------------------------------------------------------------------------------------------------------------------  Results from last 7 days   Lab Units 12/03/20  1906 12/03/20  1445   LACTATE mmol/L  --  1.8   WBC 10*3/mm3 8.71 8.38   HEMOGLOBIN g/dL 16.0 15.7   HEMATOCRIT % 46.1 45.8   MCV fL 88.8 88.6   MCHC g/dL 34.7 34.3   PLATELETS 10*3/mm3 316 294   INR  1.00  --           Results from last 7 days   Lab Units 12/04/20  0218 12/03/20  1445   SODIUM mmol/L  --  130*   POTASSIUM mmol/L  --  4.8   MAGNESIUM mg/dL 2.0  --    CHLORIDE mmol/L  --  93*   CO2 mmol/L  --  25.9   BUN mg/dL  --  21*   CREATININE mg/dL  --  0.99   EGFR IF NONAFRICN AM mL/min/1.73  --  78   CALCIUM mg/dL  --  9.5   GLUCOSE mg/dL  --  517*   ALBUMIN g/dL  --  4.36   BILIRUBIN mg/dL  --  0.3   ALK PHOS U/L  --  124*   AST (SGOT) U/L  --  35   ALT (SGPT) U/L  --  42*   Estimated Creatinine Clearance: 121.6 mL/min (by C-G formula based on SCr of 0.99 mg/dL).  No results found for: AMMONIA  Results from last 7 days   Lab Units 12/04/20  0218 12/03/20  1655 12/03/20  1445   TROPONIN T ng/mL <0.010 <0.010 <0.010     Results from last 7 days   Lab Units 12/03/20  1445   PROBNP pg/mL 538.1     Results from last 7 days   Lab Units 12/04/20  0218   CHOLESTEROL mg/dL 141   TRIGLYCERIDES mg/dL 742*   HDL CHOL mg/dL 25*   LDL CHOL mg/dL 21     Hemoglobin A1C   Date/Time Value Ref Range Status   12/04/2020 0218 10.50 (H) 4.80 - 5.60 % Final     Glucose   Date/Time Value Ref Range Status   12/04/2020 0614 203 (H) 70 - 130 mg/dL Final   12/03/2020 2030 288 (H) 70 - 130 mg/dL Final     Lab Results   Component Value Date    TSH 5.280 (H) 06/27/2019     No results found for: PREGTESTUR, PREGSERUM, HCG, HCGQUANT  Pain Management Panel     There is no flowsheet data to display.        Brief Urine Lab Results     None        No results found for: BLOODCX  No results found for: URINECX  No results found for: WOUNDCX  No results found for: STOOLCX  No results found for: RESPCX  No results found for: AFBCX  Results from last 7 days   Lab Units 12/03/20  1445   LACTATE mmol/L 1.8       I have personally looked at the labs and they are summarized above.  ----------------------------------------------------------------------------------------------------------------------  Detailed radiology reports for the last 24 hours:    Imaging  Results (Last 24 Hours)     Procedure Component Value Units Date/Time    XR Chest 1 View [[id number]] Collected: 12/03/20 1649     Updated: 12/03/20 1652    Narrative:      EXAM:    XR Chest, 1 View     CLINICAL HISTORY:    Tachycardia     TECHNIQUE:    Frontal view of the chest.     COMPARISON:    No relevant prior studies available.     FINDINGS:    LUNGS:  Coarsened interstitial markings are noted throughout the  lungs.    PLEURAL SPACE:  Unremarkable.  No pneumothorax.    HEART:  Cardiomegaly.    MEDIASTINUM:  Unremarkable.    BONES/JOINTS:  Unremarkable.       Impression:      1.  Coarsened interstitial markings are noted throughout the lungs.  2.  Cardiomegaly.     This report was finalized on 12/3/2020 4:50 PM by Dr. Jose Rodriguez MD.           Assessment & Plan    #Atrial flutter with RVR  - S/P IV boluses of metoprolol and diltiazem with limited benefit. Digoxin improved rate to around 110's overnight.   - Patient normotensive, asymptomatic with HR in 150's tonight.   - Will continue diltizem gtt when IV is established  - Will give another dose of 250 mcg digoxin. (Total of 750 mcg in last 24 hours)   - Continue heparin gtt   - Cardiology consulted and planning on cardioversion   - NPO    #Uncontrolled DM II  #Psuedohyponatremia  - Hemoglobin A1C 10.5%, Patient denies that he has DM  - Will start SSI    #HTN  - Restart home regimen as needed     #Tobacco use   - Recommend cessation, will offer NRT    #PAD    #CAD  #Hypertrygleridemia   - Restart home regimen    #HFpEF  - Appears compensated. Echo ordered.     #Obesity  - Complicates all aspects of care      F: PO  E: Replace as needed   N: NPO    Code status: Full     Dispo: Pending clinical improvement       VTE Prophylaxis:   Mechanical Order History:     None      Pharmalogical Order History:      Ordered     Dose Route Frequency Stop    12/03/20 1836  heparin (porcine) 5000 UNIT/ML injection 5,000 Units      5,000 Units IV Once 12/03/20 1851    12/03/20  1836  heparin 18067 units/250 mL (100 units/mL) in 0.45 % NaCl infusion  10 mL/hr      7.41 Units/kg/hr IV Titrated --    12/03/20 1836  heparin (porcine) 5000 UNIT/ML injection 5,000 Units      5,000 Units IV As Needed --    12/03/20 1836  heparin (porcine) 5000 UNIT/ML injection 2,500 Units      2,500 Units IV As Needed --                Arron Beltran MD  [facility]  12/04/20  08:51 EST

## 2020-12-05 VITALS
HEART RATE: 77 BPM | HEIGHT: 73 IN | BODY MASS INDEX: 39.79 KG/M2 | TEMPERATURE: 98.3 F | SYSTOLIC BLOOD PRESSURE: 151 MMHG | OXYGEN SATURATION: 93 % | DIASTOLIC BLOOD PRESSURE: 93 MMHG | RESPIRATION RATE: 16 BRPM | WEIGHT: 300.2 LBS

## 2020-12-05 LAB
BH CV ECHO MEAS - AO MAX PG: 10.4 MMHG
BH CV ECHO MEAS - AO MEAN PG: 5 MMHG
BH CV ECHO MEAS - AO ROOT AREA (BSA CORRECTED): 1.4
BH CV ECHO MEAS - AO ROOT AREA: 10.8 CM^2
BH CV ECHO MEAS - AO ROOT DIAM: 3.7 CM
BH CV ECHO MEAS - AO V2 MAX: 161 CM/SEC
BH CV ECHO MEAS - AO V2 MEAN: 92.8 CM/SEC
BH CV ECHO MEAS - AO V2 VTI: 25.4 CM
BH CV ECHO MEAS - BSA(HAYCOCK): 2.7 M^2
BH CV ECHO MEAS - BSA: 2.6 M^2
BH CV ECHO MEAS - BZI_BMI: 39.4 KILOGRAMS/M^2
BH CV ECHO MEAS - BZI_METRIC_HEIGHT: 185.4 CM
BH CV ECHO MEAS - BZI_METRIC_WEIGHT: 135.6 KG
BH CV ECHO MEAS - MV A MAX VEL: 75 CM/SEC
BH CV ECHO MEAS - MV E MAX VEL: 66 CM/SEC
BH CV ECHO MEAS - MV E/A: 0.88
BH CV ECHO MEAS - RAP SYSTOLE: 10 MMHG
BH CV ECHO MEAS - RVSP: 18.2 MMHG
BH CV ECHO MEAS - SI(AO): 107 ML/M^2
BH CV ECHO MEAS - SV(AO): 273.1 ML
BH CV ECHO MEAS - TR MAX VEL: 143 CM/SEC
GLUCOSE BLDC GLUCOMTR-MCNC: 198 MG/DL (ref 70–130)
GLUCOSE BLDC GLUCOMTR-MCNC: 293 MG/DL (ref 70–130)
QT INTERVAL: 356 MS
QTC INTERVAL: 397 MS

## 2020-12-05 PROCEDURE — 94799 UNLISTED PULMONARY SVC/PX: CPT

## 2020-12-05 PROCEDURE — 63710000001 INSULIN ASPART PER 5 UNITS: Performed by: INTERNAL MEDICINE

## 2020-12-05 PROCEDURE — 82962 GLUCOSE BLOOD TEST: CPT

## 2020-12-05 PROCEDURE — 99231 SBSQ HOSP IP/OBS SF/LOW 25: CPT | Performed by: NURSE PRACTITIONER

## 2020-12-05 PROCEDURE — 99239 HOSP IP/OBS DSCHRG MGMT >30: CPT | Performed by: INTERNAL MEDICINE

## 2020-12-05 RX ORDER — CLOPIDOGREL BISULFATE 75 MG/1
75 TABLET ORAL DAILY
Status: DISCONTINUED | OUTPATIENT
Start: 2020-12-05 | End: 2020-12-05 | Stop reason: HOSPADM

## 2020-12-05 RX ORDER — AMLODIPINE BESYLATE 10 MG/1
10 TABLET ORAL DAILY
Status: DISCONTINUED | OUTPATIENT
Start: 2020-12-05 | End: 2020-12-05 | Stop reason: HOSPADM

## 2020-12-05 RX ORDER — ACETAMINOPHEN 325 MG/1
650 TABLET ORAL EVERY 6 HOURS PRN
Status: DISCONTINUED | OUTPATIENT
Start: 2020-12-05 | End: 2020-12-05 | Stop reason: HOSPADM

## 2020-12-05 RX ORDER — ASPIRIN 81 MG/1
81 TABLET ORAL DAILY
Status: DISCONTINUED | OUTPATIENT
Start: 2020-12-05 | End: 2020-12-05

## 2020-12-05 RX ORDER — BUPROPION HYDROCHLORIDE 150 MG/1
150 TABLET, EXTENDED RELEASE ORAL 2 TIMES DAILY
Status: DISCONTINUED | OUTPATIENT
Start: 2020-12-05 | End: 2020-12-05 | Stop reason: HOSPADM

## 2020-12-05 RX ORDER — LEVOTHYROXINE SODIUM 0.05 MG/1
50 TABLET ORAL
Status: DISCONTINUED | OUTPATIENT
Start: 2020-12-05 | End: 2020-12-05 | Stop reason: HOSPADM

## 2020-12-05 RX ORDER — METOPROLOL TARTRATE 50 MG/1
50 TABLET, FILM COATED ORAL EVERY 12 HOURS SCHEDULED
Qty: 60 TABLET | Refills: 0 | Status: SHIPPED | OUTPATIENT
Start: 2020-12-05 | End: 2021-01-04 | Stop reason: SDUPTHER

## 2020-12-05 RX ORDER — NICOTINE 21 MG/24HR
1 PATCH, TRANSDERMAL 24 HOURS TRANSDERMAL
Qty: 30 PATCH | Refills: 0 | Status: SHIPPED | OUTPATIENT
Start: 2020-12-05 | End: 2021-01-04

## 2020-12-05 RX ORDER — ROSUVASTATIN CALCIUM 10 MG/1
10 TABLET, COATED ORAL DAILY
Status: DISCONTINUED | OUTPATIENT
Start: 2020-12-05 | End: 2020-12-05 | Stop reason: HOSPADM

## 2020-12-05 RX ORDER — GLIPIZIDE 5 MG/1
5 TABLET ORAL
Qty: 60 TABLET | Refills: 0 | Status: SHIPPED | OUTPATIENT
Start: 2020-12-05 | End: 2021-01-12 | Stop reason: SDUPTHER

## 2020-12-05 RX ADMIN — ROSUVASTATIN CALCIUM 10 MG: 10 TABLET, FILM COATED ORAL at 08:02

## 2020-12-05 RX ADMIN — APIXABAN 5 MG: 5 TABLET, FILM COATED ORAL at 08:52

## 2020-12-05 RX ADMIN — SODIUM CHLORIDE, PRESERVATIVE FREE 10 ML: 5 INJECTION INTRAVENOUS at 08:10

## 2020-12-05 RX ADMIN — BUPROPION HYDROCHLORIDE 150 MG: 150 TABLET, EXTENDED RELEASE ORAL at 08:02

## 2020-12-05 RX ADMIN — METOPROLOL TARTRATE 50 MG: 50 TABLET, FILM COATED ORAL at 08:52

## 2020-12-05 RX ADMIN — ASPIRIN 81 MG: 81 TABLET, COATED ORAL at 08:02

## 2020-12-05 RX ADMIN — INSULIN ASPART 8 UNITS: 100 INJECTION, SOLUTION INTRAVENOUS; SUBCUTANEOUS at 08:01

## 2020-12-05 RX ADMIN — LEVOTHYROXINE SODIUM 50 MCG: 50 TABLET ORAL at 08:02

## 2020-12-05 RX ADMIN — ACETAMINOPHEN 650 MG: 325 TABLET ORAL at 08:02

## 2020-12-05 RX ADMIN — AMLODIPINE BESYLATE 10 MG: 10 TABLET ORAL at 08:02

## 2020-12-05 RX ADMIN — GABAPENTIN 800 MG: 400 CAPSULE ORAL at 06:33

## 2020-12-05 NOTE — PROGRESS NOTES
LOS: 2 days     Name: Carlos Carson  Age/Sex: 55 y.o. male  :  1965        PCP: Marta Davis APRN  REF: No ref. provider found    Principal Problem:    Atrial flutter (CMS/HCC)      Reason for follow-up: New onset atrial flutter with RVR    Subjective       Subjective      Carlos Carson is a 55-year-old male with a past medical history significant for nonobstructive coronary artery disease, diabetes mellitus, dyslipidemia, hypertension, severe peripheral arterial disease, COPD and tobacco abuse.  Patient presented to the ER with complaints of shortness of breath and dizziness.  He was seen by ENID Davenport with cardiology yesterday and was found to be in new onset atrial flutter with RVR and it was recommended he come to the ER for further evaluation. Patient states that for the last 2-3 months he has had worsening shortness of breath and dizziness. He also has had worsening fatigue. He denies any recent chest pains, syncope or palpitations. He has no history of atrial flutter. He denies any bleeding problems. He has a history of non-obstructive coronary artery disease per his report.     Interval History: Patient reports he is feeling well today.  Remains in normal sinus rhythm with a controlled rate.  Denies any bleeding with Eliquis.  Patient is adamant about going home today.  Echocardiogram showed normal LV function.      Vital Signs  Temp:  [97.7 °F (36.5 °C)-98.3 °F (36.8 °C)] 98.3 °F (36.8 °C)  Heart Rate:  [] 85  Resp:  [] 26  BP: ()/() 164/94     Vital Signs (last 72 hrs)        0700  -  12/ 0659 / 0700  -   0659  0700  -   0659  0700  -   0830   Most Recent    Temp (°F)   97.5 -  98.8    97.6 -  98.1      98.3     98.3 (36.8)    Heart Rate   95 -  142    73 -  156    79 -  85     85    Resp   14 -  18    12 -        26     26    BP   101/90 -  144/99    99/80 -  149/93    151/88 -  164/94     164/94    SpO2 (%)    90 -  98    90 -  99    92 -  96     92        Body mass index is 39.61 kg/m².    Intake/Output Summary (Last 24 hours) at 12/5/2020 0830  Last data filed at 12/5/2020 0500  Gross per 24 hour   Intake 2566.22 ml   Output 500 ml   Net 2066.22 ml     Objective    Objective       Physical Exam:     General Appearance:    Alert, cooperative, in no acute distress   Head:    Normocephalic, without obvious abnormality, atraumatic   Eyes:            Conjunctivae and sclerae normal, no   icterus, no pallor, corneas clear.   Neck:   No adenopathy, supple, trachea midline, no thyromegaly, no   carotid bruit, no JVD   Lungs:     Clear to auscultation,respirations regular, even and                  unlabored    Heart:    Regular rhythm and normal rate, normal S1 and S2, no            murmur, no gallop, no rub, no click   Chest Wall:    No abnormalities observed   Abdomen:     Normal bowel sounds, no masses, no organomegaly, soft        non-tender, non-distended, no guarding, no rebound                tenderness   Extremities:   Moves all extremities well, no edema, no cyanosis, no             redness   Pulses:   Pulses palpable and equal bilaterally   Skin:   No bleeding, bruising or rash       Neurologic:   Alert and oriented    I have seen and performed a physical examination today.     Results review       Results Review:   Results from last 7 days   Lab Units 12/03/20  1906 12/03/20  1445   WBC 10*3/mm3 8.71 8.38   HEMOGLOBIN g/dL 16.0 15.7   PLATELETS 10*3/mm3 316 294     Results from last 7 days   Lab Units 12/03/20  1445   SODIUM mmol/L 130*   POTASSIUM mmol/L 4.8   CHLORIDE mmol/L 93*   CO2 mmol/L 25.9   BUN mg/dL 21*   CREATININE mg/dL 0.99   CALCIUM mg/dL 9.5   GLUCOSE mg/dL 517*   ALT (SGPT) U/L 42*   AST (SGOT) U/L 35     Results from last 7 days   Lab Units 12/04/20  0218 12/03/20  1655 12/03/20  1445   TROPONIN T ng/mL <0.010 <0.010 <0.010     Lab Results   Component Value Date    INR 1.00 12/03/2020     Lab  Results   Component Value Date    MG 2.0 12/04/2020     Lab Results   Component Value Date    TSH 3.590 12/04/2020    TRIG 742 (H) 12/04/2020    HDL 25 (L) 12/04/2020    LDL 21 12/04/2020      Imaging Results (Last 48 Hours)     Procedure Component Value Units Date/Time    XR Chest 1 View [[id number]] Collected: 12/03/20 1649     Updated: 12/03/20 1652    Narrative:      EXAM:    XR Chest, 1 View     CLINICAL HISTORY:    Tachycardia     TECHNIQUE:    Frontal view of the chest.     COMPARISON:    No relevant prior studies available.     FINDINGS:    LUNGS:  Coarsened interstitial markings are noted throughout the  lungs.    PLEURAL SPACE:  Unremarkable.  No pneumothorax.    HEART:  Cardiomegaly.    MEDIASTINUM:  Unremarkable.    BONES/JOINTS:  Unremarkable.       Impression:      1.  Coarsened interstitial markings are noted throughout the lungs.  2.  Cardiomegaly.     This report was finalized on 12/3/2020 4:50 PM by Dr. Jose Rodriguez MD.           Echo   Results for orders placed during the hospital encounter of 08/02/19   Adult Transthoracic Echo Complete W/ Cont if Necessary Per Protocol    Narrative · The study is technically difficult for diagnosis.  · Normal left ventricular cavity size and wall thickness noted. All left   ventricular wall segments contract normally.  · Estimated EF appears to be in the range of 61 - 65%.  · Left ventricular diastolic dysfunction (grade I) consistent with   impaired relaxation.  · The aortic valve is not well visualized.  · Mitral valve is not well visualized. The mitral valve is grossly normal   in structure. No mitral valve regurgitation is present. No significant   mitral valve stenosis is present.  · The tricuspid valve is normal. Mild tricuspid valve regurgitation is   present. Estimated right ventricular systolic pressure from tricuspid   regurgitation is normal (<35 mmHg).  · There is no evidence of pericardial effusion.         I reviewed the patient's new clinical  results.    Telemetry: NSR 70-80 bpm       Medication Review:   amLODIPine, 10 mg, Oral, Daily  apixaban, 5 mg, Oral, Q12H  aspirin, 81 mg, Oral, Daily  buPROPion SR, 150 mg, Oral, BID  gabapentin, 800 mg, Oral, Q8H  insulin aspart, 0-14 Units, Subcutaneous, TID AC  insulin detemir, 10 Units, Subcutaneous, Nightly  levothyroxine, 50 mcg, Oral, Q AM  metoprolol tartrate, 50 mg, Oral, Q12H  nicotine, 1 patch, Transdermal, Q24H  rosuvastatin, 10 mg, Oral, Daily  sodium chloride, 10 mL, Intravenous, Q12H             Assessment      Assessment:  1. New onset atrial flutter with RVR,  s/p spontaneous conversion to normal sinus rhythm yesterday, CHADS-VASc score of at least 3,  on Eliquis, rate controlled  2. ASCVD, history of nonobstructive coronary artery disease  3. Diabetes mellitus type 2  4. Essential hypertension, controlled   5. Peripheral arterial disease status post atherectomy and balloon angioplasty to right SFA in November 2018  6. Tobacco abuse  Plan     Recommendations:  1. New onset atrial flutter  · Patient spontaneously converted to normal sinus rhythm yesterday.  He remains in normal sinus rhythm today with a controlled rate.  Will continue with metoprolol for now for rate control.  Patient is adamant about going home today.  Will continue with Eliquis for stroke prevention.  Echocardiogram showed normal LV function.  · It was discussed with the patient that if he were to have recurrent atrial flutter could discuss further options such as antiarrhythmics or possible ablation in the future.  He verbalizes understanding.  · He will need to follow-up in our office in 2 weeks after discharge.  · He appears stable from a cardiac standpoint for discharge home today.    2.  Peripheral arterial disease  · Patient is a history of PAD with atherectomy and balloon angioplasty.  Given that patient was placed on Eliquis for stroke prevention will discontinue aspirin and start him on Plavix for his PAD.      I  discussed the patients findings and my recommendations with patient and family      CarmelENID Baker  12/05/20  08:30 EST    Please note that portions of this note were completed with a voice recognition program.

## 2020-12-05 NOTE — DISCHARGE SUMMARY
[facility] HOSPITALISTS DISCHARGE SUMMARY    Patient Identification:  Name:  Carlos Carson  Age:  55 y.o.  Sex:  male  :  1965  MRN:  [id number]  Visit Number:  [id number]    Date of Admission: 12/3/2020  Date of Discharge:  2020    PCP: Marta Davis, APRN    DISCHARGE DIAGNOSIS  #Atrial flutter with RVR  #Uncontrolled DM II  #Psuedohyponatremia  #HTN  #Tobacco use   #PAD  #CAD  #Hypertrygleridemia   #HFpEF  #Obesity    CONSULTS   Cardiology     PROCEDURES PERFORMED  Echocardiogram:  · The study is technically difficult for diagnosis with poor acoustic windows in the parasternal window, apical window and subcostal window.  · Left ventricular ejection fraction appears to be 56 - 60%. Left ventricular systolic function is normal.  · Left ventricular diastolic function is consistent with (grade I) impaired relaxation.  · No significant functional valvular abnormalities noted  · There is a small (<1cm) pericardial effusion. There is evidence of a fat pad present. This was present in the previous study of 10/2018         HOSPITAL COURSE  Patient is a 55 y.o. male presented on 12/3 to [facility] complaining of tachycardia.  Please see the admitting history and physical for further details.      Mr. Carson is our 56 yo M with hx of DM II, HTN, tobacco use, PAD, CAD, HLD, HFpEF, obesity who presented after being found to be tachycardic at cardiology clinic appointment. Patient reports he has had some shortness of air and dizziness. Patient was seen that morning in cardiology clinic and was found to be in aflutter in the 150's. He was recommended to go straight to ED but went to pay some bills and returned to ED. In the ED patient was found to still be in atrial flutter. Limited response to IV dilt, IV metoprolol pushes and diltiazem gtt. Patient normotensive with limited symptoms. Patient was loaded with digoxin with improvement in heart rate overnight. Patient had lost IV  access and refused an IV initially the next morning and threatened to leave AMA. He was then agreeable to stay and get IV as HR had returned to 150's. As before he was normotensive with very little symptoms. Cardiology was consulted and was planning on cardioversion but prior to procedure patient converted on his own. Diltiazem gtt and heparin gtt stopped. Metoprolol and eliquis initiated. Patient has remained in sinus with rates in 70's-80's. Patient very anxious to get home today. Since we started patient on apixiban, we will stop ASA as per cardiology recs and continue plavix. Patient had been receiving DAPT for PAD.   Patient initially denied he had DM but after my conversation with him he was agreeable to esclating oral antihyperglycemic regimen. He declined starting any insulin produces. I increased metformin and started patient on glyburide. He will need close PCP f/u for better control. Patient will need to f/u with cardiology in 2 weeks.     VITAL SIGNS:  Temp:  [97.7 °F (36.5 °C)-98.3 °F (36.8 °C)] 98.3 °F (36.8 °C)  Heart Rate:  [] 77  Resp:  [12-26] 16  BP: (106-164)/() 151/93  SpO2:  [90 %-99 %] 93 %  on  Flow (L/min):  [2] 2;   Device (Oxygen Therapy): room air    Body mass index is 39.61 kg/m².  Wt Readings from Last 3 Encounters:   12/05/20 (!) 136 kg (300 lb 3.2 oz)   12/03/20 135 kg (297 lb 12.8 oz)   03/04/20 (!) 137 kg (301 lb)       PHYSICAL EXAM:  Constitutional:  Well-developed and well-nourished.  No respiratory distress.      HENT:  Head:  Normocephalic and atraumatic.  Mouth:  Moist mucous membranes.    Eyes:  Conjunctivae and EOM are normal.  Pupils are equal, round, and reactive to light.  No scleral icterus.    Cardiovascular:  Normal rate, regular rhythm and normal heart sounds with no murmur.  Pulmonary/Chest:  No respiratory distress, no wheezes, no crackles, with normal breath sounds and good air movement.  Abdominal:  Soft.  Bowel sounds are normal.  No distension and no  tenderness.   Musculoskeletal:  No edema, no tenderness, and no deformity.  No red or swollen joints anywhere.    Neurological:  Alert and oriented to person, place, and time.  No gross neurological deficit.   Skin:  Skin is warm and dry. No rash noted. No pallor.   Peripheral vascular:  Strong pulses in all 4 extremities with no clubbing, no cyanosis, no edema.    DISCHARGE DISPOSITION   Stable    DISCHARGE MEDICATIONS:     Discharge Medications      New Medications      Instructions Start Date   apixaban 5 MG tablet tablet  Commonly known as: ELIQUIS   5 mg, Oral, Every 12 Hours Scheduled      glipizide 5 MG tablet  Commonly known as: Glucotrol   5 mg, Oral, 2 Times Daily Before Meals      metoprolol tartrate 50 MG tablet  Commonly known as: LOPRESSOR   50 mg, Oral, Every 12 Hours Scheduled      nicotine 21 MG/24HR patch  Commonly known as: NICODERM CQ   1 patch, Transdermal, Every 24 Hours Scheduled         Changes to Medications      Instructions Start Date   metFORMIN 500 MG tablet  Commonly known as: GLUCOPHAGE  What changed: how much to take   1,000 mg, Oral, 2 Times Daily With Meals         Continue These Medications      Instructions Start Date   amLODIPine 10 MG tablet  Commonly known as: NORVASC   TAKE 1 TABLET BY MOUTH EVERY DAY      buPROPion  MG 12 hr tablet  Commonly known as: WELLBUTRIN SR   150 mg, Oral, 2 Times Daily      clopidogrel 75 MG tablet  Commonly known as: PLAVIX   75 mg, Oral, Daily      gabapentin 800 MG tablet  Commonly known as: NEURONTIN   800 mg, Oral, 3 Times Daily PRN      HYDROcodone-acetaminophen 7.5-325 MG per tablet  Commonly known as: NORCO   1 tablet, Oral, 2 Times Daily PRN      levothyroxine 50 MCG tablet  Commonly known as: SYNTHROID, LEVOTHROID   50 mcg, Oral, Daily, New prescription       rosuvastatin 10 MG tablet  Commonly known as: CRESTOR   10 mg, Oral, Daily         Stop These Medications    aspirin 81 MG EC tablet     fenofibrate 145 MG tablet  Commonly known  as: TRICOR     metoprolol succinate XL 50 MG 24 hr tablet  Commonly known as: TOPROL-XL              Follow-up Information     Marta Davis, ENID Follow up in 1 week(s).    Specialties: Nurse Practitioner, Psychiatry  Contact information:  [address] [address]  [phone]             [facility] CARDIOLOGY Follow up in 2 week(s).    Specialty: Cardiology  Contact information:  [address] [place] [id number] [phone]                  TEST  RESULTS PENDING AT DISCHARGE       CODE STATUS  Code Status and Medical Interventions:   Ordered at: 12/03/20 1854     Code Status:    CPR     Medical Interventions (Level of Support Prior to Arrest):    Full       Arron Beltran MD  [facility] Hospitalist  12/05/20  11:13 EST    Please note that this discharge summary required more than 30 minutes to complete.

## 2020-12-05 NOTE — PLAN OF CARE
Goal Outcome Evaluation:  Plan of Care Reviewed With: patient  Progress: improving  Outcome Summary: VSS, afebrile, and 2L NC. Pt. has stayed in sinus rhythm throughout the shift. He has been sleeping throughout the shift. Fluid intake and UOP adequate. No complaints at this time. Call light within reach. Will continue to monitor.

## 2020-12-06 ENCOUNTER — READMISSION MANAGEMENT (OUTPATIENT)
Dept: CALL CENTER | Facility: HOSPITAL | Age: 55
End: 2020-12-06

## 2020-12-06 NOTE — OUTREACH NOTE
Prep Survey      Responses   [facility] patient discharged from?  [place]   Is LACE score < 7 ?  No   Eligibility  Readm Mgmt   Discharge diagnosis  #Atrial flutter with RVR   Does the patient have one of the following disease processes/diagnoses(primary or secondary)?  Other   Does the patient have Home health ordered?  No   Is there a DME ordered?  No   Medication alerts for this patient  Eliquis    Prep survey completed?  Yes          Brooke Mares RN

## 2020-12-09 ENCOUNTER — READMISSION MANAGEMENT (OUTPATIENT)
Dept: CALL CENTER | Facility: HOSPITAL | Age: 55
End: 2020-12-09

## 2020-12-10 ENCOUNTER — READMISSION MANAGEMENT (OUTPATIENT)
Dept: CALL CENTER | Facility: HOSPITAL | Age: 55
End: 2020-12-10

## 2020-12-10 NOTE — OUTREACH NOTE
Medical Week 1 Survey      Responses   [facility] patient discharged from?  [facility]   Does the patient have one of the following disease processes/diagnoses(primary or secondary)?  Other   Week 1 attempt successful?  Yes   Call start time  1454   Call end time  1458   Discharge diagnosis  Atrial flutter with RVR, uncontrolled DM II   Is patient permission given to speak with other caregiver?  No   Meds reviewed with patient/caregiver?  Yes   Is the patient having any side effects they believe may be caused by any medication additions or changes?  No   Does the patient have all medications ordered at discharge?  Yes   Is the patient taking all medications as directed (includes completed medication regime)?  Yes   Does the patient have a primary care provider?   Yes   Does the patient have an appointment with their PCP within 7 days of discharge?  Yes   Comments regarding PCP  PCP Marta ROSSI. Patient reports that he had telephone appt yesterday.    Has the patient kept scheduled appointments due by today?  Yes   Comments  Advised to call cardiology office and schedule 2 week appt.    Has home health visited the patient within 72 hours of discharge?  N/A   Psychosocial issues?  No   Comments  Patient reports that he does not have a glucometer. Advised patient to contact PCP for RX for glucometer and strips to monitor home blood sugar.    Did the patient receive a copy of their discharge instructions?  Yes   Nursing interventions  Reviewed instructions with patient   What is the patient's perception of their health status since discharge?  Improving   Is the patient/caregiver able to teach back signs and symptoms related to disease process for when to call PCP?  Yes   Is the patient/caregiver able to teach back signs and symptoms related to disease process for when to call 911?  Yes   Is the patient/caregiver able to teach back the hierarchy of who to call/visit for symptoms/problems? PCP, Specialist,  Home health nurse, Urgent Care, ED, 911  Yes   If the patient is a current smoker, are they able to teach back resources for cessation?  Smoking cessation medications [Patient states that he is not using the patches, has not smoked since hospital stay. ]   Week 1 call completed?  Yes          Briana Boss RN

## 2020-12-11 ENCOUNTER — TELEPHONE (OUTPATIENT)
Dept: CARDIOLOGY | Facility: CLINIC | Age: 55
End: 2020-12-11

## 2020-12-11 DIAGNOSIS — E78.1 HYPERTRIGLYCERIDEMIA: Primary | ICD-10-CM

## 2020-12-11 RX ORDER — CHLORAL HYDRATE 500 MG
2000 CAPSULE ORAL 2 TIMES DAILY WITH MEALS
Qty: 120 CAPSULE | Refills: 3 | Status: SHIPPED | OUTPATIENT
Start: 2020-12-11 | End: 2021-01-12 | Stop reason: ALTCHOICE

## 2020-12-11 NOTE — TELEPHONE ENCOUNTER
Pt called to make hospital follow up and said that his bp is still low 97/80 and his heart rate is running anywhere from 145 to 168

## 2020-12-11 NOTE — TELEPHONE ENCOUNTER
Called patient and advised that provider said to go to the ER.  Patient felt that was going to be her answer.  He will be heading to the ER now.

## 2020-12-16 NOTE — ED PROVIDER NOTES
Subjective   50-year-old white male presents for tachycardia.  Patient was noted at his doctor's office to have tachycardia today which was confirmed by EKG at the office.  He complains of minimal palpitations, but has had recent fatigue and dizziness.  Nothing makes his symptoms any better or worse.  He denied any chest pain, shortness of breath, syncopal episodes, or other complaints.  He has a history of diabetes, hyperlipidemia, peripheral vascular disease, hypertension.          Review of Systems   All other systems reviewed and are negative.      Past Medical History:   Diagnosis Date   • Diabetes (CMS/HCC)    • Hyperlipidemia    • Hypertension    • PAD (peripheral artery disease) (CMS/[facility])        Allergies   Allergen Reactions   • Lisinopril Cough       Past Surgical History:   Procedure Laterality Date   • ARTERIOGRAM N/A 11/16/2018    Procedure: Right Arteriogram;  Surgeon: Clark Manriquez MD;  Location:  [facility]ATH [facility]VASIVE [facility];  Service: Cardiology   • ARTERIOGRAM N/A 1/3/2019    Procedure: Arteriogram-- LEFT LOWER EXTREMITY;  Surgeon: Clark Manriquez MD;  Location:  [facility];  Service: Cardiology   • CARDIAC CATHETERIZATION  2011   • CARDIAC CATHETERIZATION N/A 11/16/2018    Procedure: Atherectomy-peripheral;  Surgeon: Clark Manriquez MD;  Location:  [facility]ATH [facility]VAS[facility];  Service: Cardiology       Family History   Problem Relation Age of Onset   • Hypertension Mother    • Heart disease Father 65        CABG x3v 99% blockage   • No Known Problems Sister    • No Known Problems Brother        Social History     Socioeconomic History   • Marital status: Single     Spouse name: Not on file   • Number of children: Not on file   • Years of education: Not on file   • Highest education level: Not on file   Tobacco Use   • Smoking status: Current Every Day Smoker     Packs/day: 2.00     Years: 46.00     Pack years: 92.00     Types: Cigarettes   • Smokeless tobacco: Never Used    Substance and Sexual Activity   • Alcohol use: Yes     Alcohol/week: 6.0 standard drinks     Types: 6 Cans of beer per week   • Drug use: No   • Sexual activity: Defer           Objective   Physical Exam  Vitals signs and nursing note reviewed. Exam conducted with a chaperone present.   Constitutional:       Appearance: Normal appearance.   HENT:      Head: Normocephalic and atraumatic.   Eyes:      General: No scleral icterus.  Neck:      Musculoskeletal: Normal range of motion and neck supple.   Cardiovascular:      Rate and Rhythm: Regular rhythm. Tachycardia present.      Heart sounds: No murmur. No friction rub. No gallop.    Pulmonary:      Effort: Pulmonary effort is normal.      Breath sounds: No wheezing, rhonchi or rales.   Abdominal:      General: Abdomen is flat. Bowel sounds are normal. There is no distension.      Palpations: Abdomen is soft.      Tenderness: There is no abdominal tenderness.   Musculoskeletal: Normal range of motion.         General: No swelling.   Skin:     General: Skin is warm and dry.   Neurological:      General: No focal deficit present.      Mental Status: He is alert and oriented to person, place, and time.   Psychiatric:         Mood and Affect: Mood normal.         Behavior: Behavior normal.         Procedures  Results for orders placed or performed during the hospital encounter of 12/03/20   COVID-19 and FLU A/B PCR - Swab, Nasopharynx    Specimen: Nasopharynx; Swab   Result Value Ref Range    COVID19 Not Detected Not Detected - Ref. Range    Influenza A PCR Not Detected Not Detected    Influenza B PCR Not Detected Not Detected   Lactic Acid, Plasma    Specimen: Blood   Result Value Ref Range    Lactate 1.8 0.5 - 2.0 mmol/L   Comprehensive Metabolic Panel    Specimen: Blood   Result Value Ref Range    Glucose 517 (C) 65 - 99 mg/dL    BUN 21 (H) 6 - 20 mg/dL    Creatinine 0.99 0.76 - 1.27 mg/dL    Sodium 130 (L) 136 - 145 mmol/L    Potassium 4.8 3.5 - 5.2 mmol/L     Chloride 93 (L) 98 - 107 mmol/L    CO2 25.9 22.0 - 29.0 mmol/L    Calcium 9.5 8.6 - 10.5 mg/dL    Total Protein 7.3 6.0 - 8.5 g/dL    Albumin 4.36 3.50 - 5.20 g/dL    ALT (SGPT) 42 (H) 1 - 41 U/L    AST (SGOT) 35 1 - 40 U/L    Alkaline Phosphatase 124 (H) 39 - 117 U/L    Total Bilirubin 0.3 0.0 - 1.2 mg/dL    eGFR Non African Amer 78 >60 mL/min/1.73    Globulin 2.9 gm/dL    A/G Ratio 1.5 g/dL    BUN/Creatinine Ratio 21.2 7.0 - 25.0    Anion Gap 11.1 5.0 - 15.0 mmol/L   Troponin    Specimen: Blood   Result Value Ref Range    Troponin T <0.010 0.000 - 0.030 ng/mL   Troponin    Specimen: Blood   Result Value Ref Range    Troponin T <0.010 0.000 - 0.030 ng/mL   BNP    Specimen: Blood   Result Value Ref Range    proBNP 538.1 0.0 - 900.0 pg/mL   CBC Auto Differential    Specimen: Blood   Result Value Ref Range    WBC 8.38 3.40 - 10.80 10*3/mm3    RBC 5.17 4.14 - 5.80 10*6/mm3    Hemoglobin 15.7 13.0 - 17.7 g/dL    Hematocrit 45.8 37.5 - 51.0 %    MCV 88.6 79.0 - 97.0 fL    MCH 30.4 26.6 - 33.0 pg    MCHC 34.3 31.5 - 35.7 g/dL    RDW 12.5 12.3 - 15.4 %    RDW-SD 40.4 37.0 - 54.0 fl    MPV 10.2 6.0 - 12.0 fL    Platelets 294 140 - 450 10*3/mm3    Neutrophil % 60.5 42.7 - 76.0 %    Lymphocyte % 23.7 19.6 - 45.3 %    Monocyte % 10.4 5.0 - 12.0 %    Eosinophil % 3.3 0.3 - 6.2 %    Basophil % 1.4 0.0 - 1.5 %    Immature Grans % 0.7 (H) 0.0 - 0.5 %    Neutrophils, Absolute 5.06 1.70 - 7.00 10*3/mm3    Lymphocytes, Absolute 1.99 0.70 - 3.10 10*3/mm3    Monocytes, Absolute 0.87 0.10 - 0.90 10*3/mm3    Eosinophils, Absolute 0.28 0.00 - 0.40 10*3/mm3    Basophils, Absolute 0.12 0.00 - 0.20 10*3/mm3    Immature Grans, Absolute 0.06 (H) 0.00 - 0.05 10*3/mm3    nRBC 0.0 0.0 - 0.2 /100 WBC   Protime-INR    Specimen: Blood   Result Value Ref Range    Protime 13.0 11.9 - 14.1 Seconds    INR 1.00 0.90 - 1.10   aPTT    Specimen: Blood   Result Value Ref Range    PTT 63.6 (H) 25.6 - 35.3 seconds   CBC Auto Differential    Specimen: Blood    Result Value Ref Range    WBC 8.71 3.40 - 10.80 10*3/mm3    RBC 5.19 4.14 - 5.80 10*6/mm3    Hemoglobin 16.0 13.0 - 17.7 g/dL    Hematocrit 46.1 37.5 - 51.0 %    MCV 88.8 79.0 - 97.0 fL    MCH 30.8 26.6 - 33.0 pg    MCHC 34.7 31.5 - 35.7 g/dL    RDW 12.6 12.3 - 15.4 %    RDW-SD 40.7 37.0 - 54.0 fl    MPV 10.2 6.0 - 12.0 fL    Platelets 316 140 - 450 10*3/mm3    Neutrophil % 56.4 42.7 - 76.0 %    Lymphocyte % 27.6 19.6 - 45.3 %    Monocyte % 10.4 5.0 - 12.0 %    Eosinophil % 3.4 0.3 - 6.2 %    Basophil % 1.7 (H) 0.0 - 1.5 %    Immature Grans % 0.5 0.0 - 0.5 %    Neutrophils, Absolute 4.91 1.70 - 7.00 10*3/mm3    Lymphocytes, Absolute 2.40 0.70 - 3.10 10*3/mm3    Monocytes, Absolute 0.91 (H) 0.10 - 0.90 10*3/mm3    Eosinophils, Absolute 0.30 0.00 - 0.40 10*3/mm3    Basophils, Absolute 0.15 0.00 - 0.20 10*3/mm3    Immature Grans, Absolute 0.04 0.00 - 0.05 10*3/mm3    nRBC 0.0 0.0 - 0.2 /100 WBC   Lipid Panel    Specimen: Blood   Result Value Ref Range    Total Cholesterol 141 0 - 200 mg/dL    Triglycerides 742 (H) 0 - 150 mg/dL    HDL Cholesterol 25 (L) 40 - 60 mg/dL    LDL Cholesterol  21 0 - 100 mg/dL    VLDL Cholesterol 95 (H) 5 - 40 mg/dL    LDL/HDL Ratio -1.30    Hemoglobin A1c    Specimen: Blood   Result Value Ref Range    Hemoglobin A1C 10.50 (H) 4.80 - 5.60 %   Troponin    Specimen: Blood   Result Value Ref Range    Troponin T <0.010 0.000 - 0.030 ng/mL   Magnesium    Specimen: Blood   Result Value Ref Range    Magnesium 2.0 1.6 - 2.6 mg/dL   aPTT    Specimen: Blood   Result Value Ref Range    PTT 25.5 (L) 25.6 - 35.3 seconds   TSH    Specimen: Blood   Result Value Ref Range    TSH 3.590 0.270 - 4.200 uIU/mL   POC Glucose Once    Specimen: Blood   Result Value Ref Range    Glucose 288 (H) 70 - 130 mg/dL   POC Glucose Once    Specimen: Blood   Result Value Ref Range    Glucose 203 (H) 70 - 130 mg/dL   POC Glucose Once    Specimen: Blood   Result Value Ref Range    Glucose 218 (H) 70 - 130 mg/dL   POC Glucose  Once    Specimen: Blood   Result Value Ref Range    Glucose 461 (C) 70 - 130 mg/dL   POC Glucose Once    Specimen: Blood   Result Value Ref Range    Glucose 336 (H) 70 - 130 mg/dL   POC Glucose Once    Specimen: Blood   Result Value Ref Range    Glucose 293 (H) 70 - 130 mg/dL   POC Glucose Once    Specimen: Blood   Result Value Ref Range    Glucose 198 (H) 70 - 130 mg/dL   ECG 12 Lead   Result Value Ref Range    QT Interval 314 ms    QTC Interval 468 ms   ECG 12 Lead   Result Value Ref Range    QT Interval 356 ms    QTC Interval 397 ms   Adult Transthoracic Echo Complete W/ Cont if Necessary Per Protocol   Result Value Ref Range    BSA 2.6 m^2    Ao root diam 3.7 cm    Ao root area 10.8 cm^2    Ao root area (BSA corrected) 1.4     MV E max piedad 66.0 cm/sec    MV A max piedad 75.0 cm/sec    MV E/A 0.88     Ao pk piedad 161.0 cm/sec    Ao max PG 10.4 mmHg    Ao V2 mean 92.8 cm/sec    Ao mean PG 5.0 mmHg    Ao V2 VTI 25.4 cm    SV(Ao) 273.1 ml    SI(Ao) 107.0 ml/m^2    TR max piedad 143.0 cm/sec    RVSP(TR) 18.2 mmHg    RAP systole 10.0 mmHg     CV ECHO HUNG - BZI_BMI 39.4 kilograms/m^2     CV ECHO HUNG - BSA(HAYCOCK) 2.7 m^2     CV ECHO HUNG - BZI_METRIC_WEIGHT 135.6 kg     CV ECHO HUNG - BZI_METRIC_HEIGHT 185.4 cm   Light Blue Top   Result Value Ref Range    Extra Tube hold for add-on    Green Top (Gel)   Result Value Ref Range    Extra Tube Hold for add-ons.    Lavender Top   Result Value Ref Range    Extra Tube hold for add-on    Gold Top - SST   Result Value Ref Range    Extra Tube Hold for add-ons.        Xr Chest 1 View    Result Date: 12/3/2020  Narrative: EXAM:   XR Chest, 1 View  CLINICAL HISTORY:   Tachycardia  TECHNIQUE:   Frontal view of the chest.  COMPARISON:   No relevant prior studies available.  FINDINGS:   LUNGS:  Coarsened interstitial markings are noted throughout the lungs.   PLEURAL SPACE:  Unremarkable.  No pneumothorax.   HEART:  Cardiomegaly.   MEDIASTINUM:  Unremarkable.   BONES/JOINTS:   Unremarkable.      Impression: 1.  Coarsened interstitial markings are noted throughout the lungs. 2.  Cardiomegaly.  This report was finalized on 12/3/2020 4:50 PM by Dr. Jose Rodriguez MD.               ED Course  ED Course as of Dec 16 1630   Thu Dec 03, 2020   1804 Discussed the case wit Dr Gonzales, he recommends    [JM]   1818 Endorsed to Dr. Foley at shift change.    [BC]   1836 Taking over the care of the patient at this time.  Patient arrived emergency department today with tachycardia.  He was found to be in a flutter after initial administration of adenosine.  Multiple times control been unsuccessful including Cardizem bolus and maxed out on Cardizem drip.  1 dose of metoprolol was effective temporarily.  I discussed the case with cardiology who recommended going ahead with 1 dose of digoxin 250 mcg.  He said we could repeat this dose in 1 hour if needed.  The patient is not on anticoagulation therefore he will be started on heparin drip at this time.  Will be admitted to the hospital for further evaluation hospitalist paged at this time    [JM]      ED Course User Index  [BC] Phoenix Louie MD  [JM] Virgil Foley, DO                                           MDM  Number of Diagnoses or Management Options  New onset atrial flutter (CMS/HCC):   Risk of Complications, Morbidity, and/or Mortality  Presenting problems: high  Diagnostic procedures: moderate  Management options: high        Final diagnoses:   New onset atrial flutter (CMS/HCC)            Phoenix Louie MD  12/16/20 7380

## 2020-12-21 ENCOUNTER — READMISSION MANAGEMENT (OUTPATIENT)
Dept: CALL CENTER | Facility: HOSPITAL | Age: 55
End: 2020-12-21

## 2020-12-21 NOTE — OUTREACH NOTE
Medical Week 2 Survey      Responses   [facility] patient discharged from?  [facility]   Does the patient have one of the following disease processes/diagnoses(primary or secondary)?  Other   Week 2 attempt successful?  Yes   Call start time  0829   Discharge diagnosis  Atrial flutter with RVR, uncontrolled DM II   Call end time  0837   Meds reviewed with patient/caregiver?  Yes   Is the patient taking all medications as directed (includes completed medication regime)?  Yes   Has the patient kept scheduled appointments due by today?  Yes   Comments  Cardiology appt Jan 7   What is the patient's perception of their health status since discharge?  Same   If the patient is a current smoker, are they able to teach back resources for cessation?  -- [Has not smoked since Dec 3]   Additional teach back comments  Patient says HR varies range /150 Patient says he is unaware of rapid heart rate. He knows when he checks his B/P. Advised to call cardiologist of continued fast heart rate. B/P reading 101/87   Week 2 Call Completed?  Yes          Jennifer Ward RN

## 2020-12-29 ENCOUNTER — READMISSION MANAGEMENT (OUTPATIENT)
Dept: CALL CENTER | Facility: HOSPITAL | Age: 55
End: 2020-12-29

## 2020-12-29 NOTE — OUTREACH NOTE
Medical Week 3 Survey      Responses   [facility] patient discharged from?  [facility]   Does the patient have one of the following disease processes/diagnoses(primary or secondary)?  Other   Week 3 attempt successful?  Yes   Call start time  1108   Call end time  1115   Discharge diagnosis  Atrial flutter with RVR, uncontrolled DM II   Meds reviewed with patient/caregiver?  Yes   Is the patient taking all medications as directed (includes completed medication regime)?  Yes   Has the patient kept scheduled appointments due by today?  Yes   Psychosocial issues?  No   Comments   HR ranges he reports, he is monitoring it at home. BP been 127/70, he denies CP, fluttering, or dizziness. He quit smoking so he reports that his SOA is improving.    What is the patient's perception of their health status since discharge?  Improving   Is the patient/caregiver able to teach back the hierarchy of who to call/visit for symptoms/problems? PCP, Specialist, Home health nurse, Urgent Care, ED, 911  Yes   If the patient is a current smoker, are they able to teach back resources for cessation?  -- [He quit smoking Dec 3 2020. ]   Additional teach back comments  Advised to call Cardiology today with the HR range.    Week 3 Call Completed?  Yes          Sabiha Patten RN

## 2021-01-04 RX ORDER — METOPROLOL TARTRATE 50 MG/1
50 TABLET, FILM COATED ORAL EVERY 12 HOURS SCHEDULED
Qty: 60 TABLET | Refills: 0 | Status: SHIPPED | OUTPATIENT
Start: 2021-01-04 | End: 2021-01-19

## 2021-01-05 ENCOUNTER — READMISSION MANAGEMENT (OUTPATIENT)
Dept: CALL CENTER | Facility: HOSPITAL | Age: 56
End: 2021-01-05

## 2021-01-05 NOTE — OUTREACH NOTE
Medical Week 4 Survey      Responses   [facility] patient discharged from?  [facility]   Does the patient have one of the following disease processes/diagnoses(primary or secondary)?  Other   Week 4 attempt successful?  Yes   Call start time  0831   Call end time  0833   Meds reviewed with patient/caregiver?  Yes   Is the patient taking all medications as directed (includes completed medication regime)?  Yes   Has the patient kept scheduled appointments due by today?  Yes   Is the patient still receiving Home Health Services?  No   What is the patient's perception of their health status since discharge?  Improving   Additional teach back comments  Quit smoking 12/03/2020   Week 4 Call Completed?  Yes   Would the patient like one additional call?  No   Graduated  Yes   Is the patient interested in additional calls from an ambulatory ?  NOTE:  applies to high risk patients requiring additional follow-up.  No   Did the patient feel the follow up calls were helpful during their recovery period?  Yes   Was the number of calls appropriate?  Yes   Wrap up additional comments  no questions or new symptoms doing well          Leann Perez RN

## 2021-01-12 ENCOUNTER — OFFICE VISIT (OUTPATIENT)
Dept: CARDIOLOGY | Facility: CLINIC | Age: 56
End: 2021-01-12

## 2021-01-12 VITALS
HEIGHT: 73 IN | TEMPERATURE: 97.7 F | HEART RATE: 86 BPM | BODY MASS INDEX: 40.69 KG/M2 | OXYGEN SATURATION: 95 % | WEIGHT: 307 LBS | DIASTOLIC BLOOD PRESSURE: 83 MMHG | SYSTOLIC BLOOD PRESSURE: 131 MMHG

## 2021-01-12 DIAGNOSIS — I25.10 ASCVD (ARTERIOSCLEROTIC CARDIOVASCULAR DISEASE): Primary | ICD-10-CM

## 2021-01-12 DIAGNOSIS — I48.92 PAROXYSMAL ATRIAL FLUTTER (HCC): ICD-10-CM

## 2021-01-12 DIAGNOSIS — E78.5 DYSLIPIDEMIA: ICD-10-CM

## 2021-01-12 DIAGNOSIS — I73.9 PERIPHERAL ARTERIAL DISEASE (HCC): ICD-10-CM

## 2021-01-12 DIAGNOSIS — I10 ESSENTIAL HYPERTENSION: ICD-10-CM

## 2021-01-12 DIAGNOSIS — I31.39 PERICARDIAL EFFUSION: ICD-10-CM

## 2021-01-12 DIAGNOSIS — E11.9 TYPE 2 DIABETES MELLITUS WITHOUT COMPLICATION, WITHOUT LONG-TERM CURRENT USE OF INSULIN (HCC): ICD-10-CM

## 2021-01-12 PROCEDURE — 99214 OFFICE O/P EST MOD 30 MIN: CPT | Performed by: NURSE PRACTITIONER

## 2021-01-12 RX ORDER — GLIPIZIDE 5 MG/1
5 TABLET ORAL
COMMUNITY

## 2021-01-12 NOTE — PROGRESS NOTES
Marta Davis, APRN  Carlos Carson  1965 01/12/2021    Patient Active Problem List   Diagnosis   • Essential hypertension   • Coronary artery disease without angina pectoris, non-hemodynamically significant   • PVD (peripheral vascular disease) (CMS/Formerly [facility])   • Dyslipidemia   • Tobacco abuse   • Simple chronic bronchitis (CMS/[facility]ly [facility])   • Obesity (BMI 30-39.9)   • Cough due to ACE inhibitor (Lisinopril)   • Chronic bronchitis (CMS/[facility])   • ASCVD (arteriosclerotic cardiovascular disease), apparently nonobstructive, clinically stable.   • Peripheral arterial disease, involving both lower extremities with bilateral leg claudication.   • Chronic fatigue   • PAD (peripheral artery disease) (CMS/[facility])   • Atrial flutter (CMS/[facility])       Dear Marta Davis, APRN:    Subjective     Chief Complaint   Patient presents with   • Med Management     med list.    • Atrial Fibrillation           History of Present Illness:    Carlos Carson is a 55 y.o. male with a past medical history significant for nonobstructive coronary artery disease, dyslipidemia, hypertension, severe peripheral arterial disease status post arteriogram and intervention of bilateral lower extremities in November 2018 and again in January 2019, COPD, tobacco abuse, diabetes mellitus type II and recently diagnosed atrial flutter.  He was admitted to [facility] on 12/3/2020 due to atrial flutter with rapid ventricular rate.  Ultimately, he converted to sinus rhythm with IV diltiazem.  Since discharge home, he reports he does notice his heart rate will be elevated intermittently on his blood pressure machine.  However, he denies any palpitations, dizziness, or lightheadedness.  He denies chest pains.  He has chronic dyspnea with mild to moderate exertion which she thinks is slightly better.  He reports he has not smoked any cigarettes since his discharge home from the hospital in early December.  Lipids were reevaluated while inpatient and  did improve.  Triglycerides are still elevated.  The patient was placed on fish oil but states he cannot tolerate this.  He is now on medications for his diabetes and blood sugar has been better.  He reports he does have intermittent claudication the legs although this is not as bad as it was prior to intervention in the past.  In addition, an echocardiogram while inpatient revealed an the EF of 56 to 60% with small pericardial effusion.          Allergies   Allergen Reactions   • Lisinopril Cough   :      Current Outpatient Medications:   •  amLODIPine (NORVASC) 10 MG tablet, TAKE 1 TABLET BY MOUTH EVERY DAY, Disp: 30 tablet, Rfl: 3  •  apixaban (ELIQUIS) 5 MG tablet tablet, Take 1 tablet by mouth Every 12 (Twelve) Hours for 30 days. Indications: Atrial Fibrillation, Disp: 60 tablet, Rfl: 0  •  buPROPion SR (WELLBUTRIN SR) 150 MG 12 hr tablet, Take 150 mg by mouth 2 (Two) Times a Day., Disp: , Rfl:   •  clopidogrel (PLAVIX) 75 MG tablet, Take 1 tablet by mouth Daily., Disp: 30 tablet, Rfl: 11  •  gabapentin (NEURONTIN) 800 MG tablet, Take 800 mg by mouth 3 (Three) Times a Day As Needed (nerve pain)., Disp: , Rfl: 0  •  glipizide (GLUCOTROL) 5 MG tablet, Take 5 mg by mouth 2 (Two) Times a Day Before Meals., Disp: , Rfl:   •  HYDROcodone-acetaminophen (NORCO) 7.5-325 MG per tablet, Take 1 tablet by mouth 2 (Two) Times a Day As Needed for Moderate Pain ., Disp: , Rfl: 0  •  levothyroxine (SYNTHROID, LEVOTHROID) 50 MCG tablet, Take 50 mcg by mouth Daily. New prescription, Disp: , Rfl:   •  metFORMIN (GLUCOPHAGE) 500 MG tablet, Take 500 mg by mouth 2 (Two) Times a Day With Meals., Disp: , Rfl:   •  metoprolol tartrate (LOPRESSOR) 50 MG tablet, Take 1 tablet by mouth Every 12 (Twelve) Hours for 30 days., Disp: 60 tablet, Rfl: 0  •  rosuvastatin (CRESTOR) 10 MG tablet, Take 1 tablet by mouth Daily., Disp: 30 tablet, Rfl: 11      The following portions of the patient's history were reviewed and updated as appropriate:  "allergies, current medications, past family history, past medical history, past social history, past surgical history and problem list.    Social History     Tobacco Use   • Smoking status: Current Every Day Smoker     Packs/day: 2.00     Years: 46.00     Pack years: 92.00     Types: Cigarettes   • Smokeless tobacco: Never Used   Substance Use Topics   • Alcohol use: Yes     Alcohol/week: 6.0 standard drinks     Types: 6 Cans of beer per week   • Drug use: No       Review of Systems   Constitution: Negative for decreased appetite and malaise/fatigue.   Cardiovascular: Positive for claudication and dyspnea on exertion. Negative for chest pain, irregular heartbeat, leg swelling, near-syncope, orthopnea, palpitations, paroxysmal nocturnal dyspnea and syncope.   Respiratory: Negative for cough, shortness of breath and wheezing.    Neurological: Negative for dizziness, light-headedness and weakness.       Objective   Vitals:    01/12/21 0906   BP: 131/83   BP Location: Right arm   Patient Position: Sitting   Cuff Size: Adult   Pulse: 86   Temp: 97.7 °F (36.5 °C)   TempSrc: Infrared   SpO2: 95%   Weight: (!) 139 kg (307 lb)   Height: 185.4 cm (73\")     Body mass index is 40.5 kg/m².        Vitals signs reviewed.   Constitutional:       Appearance: Healthy appearance. Well-developed and not in distress.   HENT:      Head: Normocephalic and atraumatic.   Pulmonary:      Effort: Pulmonary effort is normal.      Breath sounds: Normal breath sounds. No wheezing. No rales.   Cardiovascular:      Normal rate. Regular rhythm.      Murmurs: There is no murmur.      . No S3 and S4 gallop.   Edema:     Peripheral edema absent.   Abdominal:      General: Bowel sounds are normal.      Palpations: Abdomen is soft.   Skin:     General: Skin is warm and dry.   Neurological:      Mental Status: Alert, oriented to person, place, and time and oriented to person, place and time.   Psychiatric:         Mood and Affect: Mood normal.         " Behavior: Behavior normal.         Lab Results   Component Value Date     (L) 12/03/2020    K 4.8 12/03/2020    CL 93 (L) 12/03/2020    CO2 25.9 12/03/2020    BUN 21 (H) 12/03/2020    CREATININE 0.99 12/03/2020    GLUCOSE 517 (C) 12/03/2020    CALCIUM 9.5 12/03/2020    AST 35 12/03/2020    ALT 42 (H) 12/03/2020    ALKPHOS 124 (H) 12/03/2020     No results found for: CKTOTAL  Lab Results   Component Value Date    WBC 8.71 12/03/2020    HGB 16.0 12/03/2020    HCT 46.1 12/03/2020     12/03/2020     Lab Results   Component Value Date    INR 1.00 12/03/2020     Lab Results   Component Value Date    MG 2.0 12/04/2020     Lab Results   Component Value Date    TSH 3.590 12/04/2020    TRIG 742 (H) 12/04/2020    HDL 25 (L) 12/04/2020    LDL 21 12/04/2020      No results found for: BNP        Procedures      Assessment/Plan    Diagnosis Plan   1. ASCVD (arteriosclerotic cardiovascular disease), apparently nonobstructive, clinically stable.     2. Peripheral arterial disease, involving both lower extremities with bilateral leg claudication.     3. Essential hypertension     4. Dyslipidemia     5. Type 2 diabetes mellitus without complication, without long-term current use of insulin (CMS/[facility])     6. Paroxysmal atrial flutter (CMS/[facility])  Cardiac Event Monitor   7. Pericardial effusion  Adult Transthoracic Echo Limited W/ Cont if Necessary Per Protocol                Recommendations:    1.  For the paroxysmal atrial flutter-we will continue with Eliquis and metoprolol.  I have ordered a 30-day event monitor to further evaluate his recent complaints of tachycardia.  If he has further episodes of atrial flutter, we will have to consider antiarrhythmic therapy versus EP referral for possible ablation.    2.  Peripheral arterial disease-I recommended arterial Doppler of the lower extremities and referral back to Dr. Manriquez in interventional cardiology.  However, the patient states he does not want to pursue at this time  and has declined referral.    3.  Hypertension-well controlled we will continue with amlodipine.    4.  Dyslipidemia-overall improved, LDL at goal.  He refuses any further medications for his elevated triglycerides.  I do suspect this will improve with better glucose management.    5.  For the small pericardial effusion-repeat limited echocardiogram.    6.  Diabetes mellitus type 2-he will continue to follow closely with his PCP for glucose management.    7.  Follow-up in 6 weeks or sooner if needed.      Return in about 6 weeks (around 2/23/2021) for Recheck.    As always, I appreciate very much the opportunity to participate in the cardiovascular care of your patients.      With Best Regards,    ENID Davenport

## 2021-01-13 ENCOUNTER — TELEPHONE (OUTPATIENT)
Dept: CARDIOLOGY | Facility: CLINIC | Age: 56
End: 2021-01-13

## 2021-01-13 NOTE — TELEPHONE ENCOUNTER
Please check on him and see how he is feeling. Can we please get him in ASAP with Dr. Gonzales to discuss antiarrhythmic therapy? Thanks.

## 2021-01-13 NOTE — TELEPHONE ENCOUNTER
Valentino called with reading.  Report will be faxed.    1-31-21 8:35 AM ATRIAL FLUTTER 150 BPM AND ASYMPTOMATIC AUTO DETECTED.

## 2021-01-13 NOTE — TELEPHONE ENCOUNTER
"Called patient and he is feeling fine, no diff breathing, no shortness of breath, no chest pain..  Patient states that the monitor is saying that monitor says\"poor skin contact\" but the tab is still sticking to him well and is not pealing off of him.  He also said that he has good cell service but no Internet service. (doesn't know if that is the problem). He still has 29 days to go  What do you want to do now?  "

## 2021-01-18 DIAGNOSIS — I48.92 PAROXYSMAL ATRIAL FLUTTER (HCC): ICD-10-CM

## 2021-01-19 ENCOUNTER — OFFICE VISIT (OUTPATIENT)
Dept: CARDIOLOGY | Facility: CLINIC | Age: 56
End: 2021-01-19

## 2021-01-19 VITALS
HEIGHT: 73 IN | TEMPERATURE: 96.6 F | BODY MASS INDEX: 40.05 KG/M2 | DIASTOLIC BLOOD PRESSURE: 80 MMHG | WEIGHT: 302.2 LBS | HEART RATE: 146 BPM | SYSTOLIC BLOOD PRESSURE: 112 MMHG

## 2021-01-19 DIAGNOSIS — I48.92 PAROXYSMAL ATRIAL FLUTTER (HCC): Primary | ICD-10-CM

## 2021-01-19 DIAGNOSIS — I25.10 ASCVD (ARTERIOSCLEROTIC CARDIOVASCULAR DISEASE): ICD-10-CM

## 2021-01-19 DIAGNOSIS — I73.9 PERIPHERAL ARTERIAL DISEASE (HCC): ICD-10-CM

## 2021-01-19 DIAGNOSIS — E11.9 TYPE 2 DIABETES MELLITUS WITHOUT COMPLICATION, WITHOUT LONG-TERM CURRENT USE OF INSULIN (HCC): ICD-10-CM

## 2021-01-19 DIAGNOSIS — I10 ESSENTIAL HYPERTENSION: ICD-10-CM

## 2021-01-19 PROCEDURE — 99214 OFFICE O/P EST MOD 30 MIN: CPT | Performed by: INTERNAL MEDICINE

## 2021-01-19 RX ORDER — LOSARTAN POTASSIUM 25 MG/1
25 TABLET ORAL DAILY
Qty: 30 TABLET | Refills: 3 | Status: SHIPPED | OUTPATIENT
Start: 2021-01-19 | End: 2021-04-19

## 2021-01-19 RX ORDER — METOPROLOL TARTRATE 50 MG/1
100 TABLET, FILM COATED ORAL EVERY 12 HOURS SCHEDULED
Qty: 120 TABLET | Refills: 5 | Status: SHIPPED | OUTPATIENT
Start: 2021-01-19 | End: 2021-01-28 | Stop reason: SDUPTHER

## 2021-01-19 NOTE — PROGRESS NOTES
Marta Davis, APRN  Carlos Carson  1965 01/19/2021    Patient Active Problem List   Diagnosis   • Essential hypertension   • Coronary artery disease without angina pectoris, non-hemodynamically significant   • PVD (peripheral vascular disease) (CMS/[facility])   • Dyslipidemia   • Tobacco abuse   • Simple chronic bronchitis (CMS/[facility])   • Obesity (BMI 30-39.9)   • Cough due to ACE inhibitor (Lisinopril)   • Chronic bronchitis (CMS/[facility])   • ASCVD (arteriosclerotic cardiovascular disease), apparently nonobstructive, clinically stable.   • Peripheral arterial disease, involving both lower extremities with bilateral leg claudication.   • Chronic fatigue   • PAD (peripheral artery disease) (CMS/[facility])   • Atrial flutter (CMS/[facility])       Dear Marta Davis, APRN:    Subjective     Carlos Carson is a 55 y.o. male with the problems as listed above, presents    Chief Complaint   Patient presents with   • Follow-up     ABORMAL MONITOR READING   • Med Management     LIST       History of Present Illness: Mr. Carson a pleasant 55-year-old  male with recent diagnosis of atrial flutter with rapid ventricular rate for which she has been started on metoprolol and Eliquis.  He had an event monitor placed which is ongoing.  He had recordings of episodes of atrial flutter with rapid ventricular rate and hence is brought in today to adjust his medications and for further management.  On further questioning he denies any complaints of palpitations, dizziness or syncope. He states he has been taking his medications including metoprolol and Eliquis regularly.        Allergies   Allergen Reactions   • Lisinopril Cough       Current Outpatient Medications:   •  apixaban (ELIQUIS) 5 MG tablet tablet, Take 1 tablet by mouth Every 12 (Twelve) Hours for 30 days. Indications: Atrial Fibrillation, Disp: 60 tablet, Rfl: 0  •  buPROPion SR (WELLBUTRIN SR) 150 MG 12 hr tablet, Take 150 mg by mouth 2 (Two) Times a Day., Disp: ,  Rfl:   •  clopidogrel (PLAVIX) 75 MG tablet, Take 1 tablet by mouth Daily., Disp: 30 tablet, Rfl: 11  •  gabapentin (NEURONTIN) 800 MG tablet, Take 800 mg by mouth 3 (Three) Times a Day As Needed (nerve pain)., Disp: , Rfl: 0  •  glipizide (GLUCOTROL) 5 MG tablet, Take 5 mg by mouth 2 (Two) Times a Day Before Meals., Disp: , Rfl:   •  HYDROcodone-acetaminophen (NORCO) 7.5-325 MG per tablet, Take 1 tablet by mouth 2 (Two) Times a Day As Needed for Moderate Pain ., Disp: , Rfl: 0  •  levothyroxine (SYNTHROID, LEVOTHROID) 50 MCG tablet, Take 50 mcg by mouth Daily. New prescription, Disp: , Rfl:   •  metFORMIN (GLUCOPHAGE) 500 MG tablet, Take 500 mg by mouth 2 (Two) Times a Day With Meals., Disp: , Rfl:   •  metoprolol tartrate (LOPRESSOR) 50 MG tablet, Take 2 tablets by mouth Every 12 (Twelve) Hours for 30 days., Disp: 120 tablet, Rfl: 5  •  rosuvastatin (CRESTOR) 10 MG tablet, Take 1 tablet by mouth Daily., Disp: 30 tablet, Rfl: 11  •  losartan (Cozaar) 25 MG tablet, Take 1 tablet by mouth Daily., Disp: 30 tablet, Rfl: 3      The following portions of the patient's history were reviewed and updated as appropriate: allergies, current medications, past family history, past medical history, past social history, past surgical history and problem list.    Social History     Tobacco Use   • Smoking status: Current Every Day Smoker     Packs/day: 2.00     Years: 46.00     Pack years: 92.00     Types: Cigarettes   • Smokeless tobacco: Never Used   Substance Use Topics   • Alcohol use: Yes     Alcohol/week: 6.0 standard drinks     Types: 6 Cans of beer per week   • Drug use: No     Review of Systems   Constitution: Negative for chills and fever.   HENT: Negative for nosebleeds and sore throat.    Respiratory: Negative for cough, hemoptysis and wheezing.    Gastrointestinal: Negative for abdominal pain, hematemesis, hematochezia, melena, nausea and vomiting.   Genitourinary: Negative for dysuria and hematuria.   Neurological:  "Negative for headaches.       Objective   Vitals:    01/19/21 1547   BP: 112/80   Pulse: (!) 146   Temp: 96.6 °F (35.9 °C)   Weight: (!) 137 kg (302 lb 3.2 oz)   Height: 185.4 cm (73\")     Body mass index is 39.87 kg/m².    Vitals signs reviewed.   Constitutional:       Appearance: Well-developed.   Eyes:      Pupils: Pupils are equal, round, and reactive to light.   Neck:      Musculoskeletal: Neck supple.      Thyroid: No thyromegaly.      Vascular: No JVD.      Trachea: No tracheal deviation.      Lymphadenopathy: No cervical adenopathy.   Pulmonary:      Effort: Pulmonary effort is normal.      Breath sounds: Normal breath sounds.   Cardiovascular:      PMI at left midclavicular line. Normal rate. Regularly irregular rhythm. Normal S1. Normal S2.      Murmurs: There is no murmur.      No gallop. No click. No rub.   Pulses:     Intact distal pulses.   Edema:     Peripheral edema absent.   Abdominal:      General: Bowel sounds are normal.      Palpations: Abdomen is soft. There is no abdominal mass.      Tenderness: There is no abdominal tenderness.   Musculoskeletal: Normal range of motion.   Skin:     General: Skin is warm and dry.      Findings: No rash.   Neurological:      Mental Status: Alert and oriented to person, place, and time.         Lab Results   Component Value Date     (L) 12/03/2020    K 4.8 12/03/2020    CL 93 (L) 12/03/2020    CO2 25.9 12/03/2020    BUN 21 (H) 12/03/2020    CREATININE 0.99 12/03/2020    GLUCOSE 517 (C) 12/03/2020    CALCIUM 9.5 12/03/2020    AST 35 12/03/2020    ALT 42 (H) 12/03/2020    ALKPHOS 124 (H) 12/03/2020     No results found for: CKTOTAL  Lab Results   Component Value Date    WBC 8.71 12/03/2020    HGB 16.0 12/03/2020    HCT 46.1 12/03/2020     12/03/2020     Lab Results   Component Value Date    INR 1.00 12/03/2020     Lab Results   Component Value Date    MG 2.0 12/04/2020     Lab Results   Component Value Date    TSH 3.590 12/04/2020    TRIG 742 (H) " 12/04/2020    HDL 25 (L) 12/04/2020    LDL 21 12/04/2020        Assessment/Plan :   Diagnosis Plan   1. Paroxysmal atrial flutter (CMS/HCC)     2. ASCVD (arteriosclerotic cardiovascular disease), apparently nonobstructive, clinically stable.     3. Peripheral arterial disease, involving both lower extremities with bilateral leg claudication.     4. Essential hypertension     5. Type 2 diabetes mellitus without complication, without long-term current use of insulin (CMS/HCC)          Recommendations:  1. I have discussed the event monitor recordings with the patient.    2. Increase metoprolol 200 mg p.o. twice daily and discontinue the amlodipine.  3. Add losartan 25 mg daily  4. Type 2 diabetes mellitus..  5. Since he is converted back to sinus rhythm on his own in the past, will try the beta-blockers for now and see how he does and if he has persistent atrial flutter will consider electrocardioversion and referral to EP for consideration of RF ablation in the future.  6. BMP in a week    Return in about 3 weeks (around 2/9/2021).    As always, Marta   I appreciate very much the opportunity to participate in the cardiovascular care of your patients. Please do not hesitate to call me with any questions with regards to Carlos Carson's evaluation and management.       With Best Regards,        Mohit Gonzales MD, [facility]    Please note that portions of this note were completed with a voice recognition program.

## 2021-01-26 ENCOUNTER — APPOINTMENT (OUTPATIENT)
Dept: CARDIOLOGY | Facility: HOSPITAL | Age: 56
End: 2021-01-26

## 2021-01-28 ENCOUNTER — TELEPHONE (OUTPATIENT)
Dept: CARDIOLOGY | Facility: CLINIC | Age: 56
End: 2021-01-28

## 2021-01-28 RX ORDER — METOPROLOL TARTRATE 50 MG/1
100 TABLET, FILM COATED ORAL EVERY 12 HOURS SCHEDULED
Qty: 120 TABLET | Refills: 5 | Status: SHIPPED | OUTPATIENT
Start: 2021-01-28 | End: 2021-02-04

## 2021-01-28 RX ORDER — CLOPIDOGREL BISULFATE 75 MG/1
75 TABLET ORAL DAILY
Qty: 30 TABLET | Refills: 11 | Status: SHIPPED | OUTPATIENT
Start: 2021-01-28 | End: 2021-09-01 | Stop reason: SDUPTHER

## 2021-01-28 NOTE — TELEPHONE ENCOUNTER
Patient needs his metoprolol called in. Patient says the last time he was here Dr. Gonzales put him on a higher dose and he is almost out. He says the new prescription was not called in. Patient also needs Plavix.     Thanks!

## 2021-01-28 NOTE — TELEPHONE ENCOUNTER
Called pt advise him that  wants him to come into the office on 2/4/21 at 2:45 pm to go over a event reading with him. He expressed understanding.

## 2021-02-02 ENCOUNTER — APPOINTMENT (OUTPATIENT)
Dept: CARDIOLOGY | Facility: HOSPITAL | Age: 56
End: 2021-02-02

## 2021-02-04 ENCOUNTER — OFFICE VISIT (OUTPATIENT)
Dept: CARDIOLOGY | Facility: CLINIC | Age: 56
End: 2021-02-04

## 2021-02-04 VITALS
HEIGHT: 73 IN | BODY MASS INDEX: 39.76 KG/M2 | SYSTOLIC BLOOD PRESSURE: 122 MMHG | TEMPERATURE: 94.8 F | HEART RATE: 126 BPM | WEIGHT: 300 LBS | DIASTOLIC BLOOD PRESSURE: 94 MMHG | OXYGEN SATURATION: 96 % | RESPIRATION RATE: 16 BRPM

## 2021-02-04 DIAGNOSIS — I10 ESSENTIAL HYPERTENSION: ICD-10-CM

## 2021-02-04 DIAGNOSIS — I73.9 PERIPHERAL ARTERIAL DISEASE (HCC): ICD-10-CM

## 2021-02-04 DIAGNOSIS — I48.0 PAROXYSMAL ATRIAL FIBRILLATION (HCC): Primary | ICD-10-CM

## 2021-02-04 DIAGNOSIS — R10.13 DYSPEPSIA: ICD-10-CM

## 2021-02-04 DIAGNOSIS — I48.92 PAROXYSMAL ATRIAL FLUTTER (HCC): ICD-10-CM

## 2021-02-04 PROCEDURE — 99214 OFFICE O/P EST MOD 30 MIN: CPT | Performed by: INTERNAL MEDICINE

## 2021-02-04 RX ORDER — METOPROLOL TARTRATE 50 MG/1
125 TABLET, FILM COATED ORAL EVERY 12 HOURS SCHEDULED
Qty: 150 TABLET | Refills: 3 | Status: SHIPPED | OUTPATIENT
Start: 2021-02-04 | End: 2021-02-04 | Stop reason: SDUPTHER

## 2021-02-04 RX ORDER — FLECAINIDE ACETATE 50 MG/1
50 TABLET ORAL 2 TIMES DAILY
Qty: 60 TABLET | Refills: 11 | Status: SHIPPED | OUTPATIENT
Start: 2021-02-04 | End: 2021-09-01 | Stop reason: SDUPTHER

## 2021-02-04 RX ORDER — FAMOTIDINE 40 MG/1
40 TABLET, FILM COATED ORAL DAILY
Status: DISCONTINUED | OUTPATIENT
Start: 2021-02-04 | End: 2021-02-05

## 2021-02-04 RX ORDER — METOPROLOL TARTRATE 50 MG/1
125 TABLET, FILM COATED ORAL EVERY 12 HOURS SCHEDULED
Qty: 150 TABLET | Refills: 3 | Status: SHIPPED | OUTPATIENT
Start: 2021-02-04 | End: 2021-08-20 | Stop reason: SDUPTHER

## 2021-02-04 NOTE — TELEPHONE ENCOUNTER
Patient was here at the clinic today and dr izaguirre did not the pepcid in to the pharmacy.  Dr izaguirre did it as an injection that is why it did not go to the pharmacy

## 2021-02-04 NOTE — PROGRESS NOTES
"Marta Davis, APRN  Carlos Carson  2021    Patient Active Problem List   Diagnosis   • Essential hypertension   • Coronary artery disease without angina pectoris, non-hemodynamically significant   • PVD (peripheral vascular disease) (CMS/Formerly [facility])   • Dyslipidemia   • Tobacco abuse   • Simple chronic bronchitis (CMS/HCC)   • Obesity (BMI 30-39.9)   • Cough due to ACE inhibitor (Lisinopril)   • Chronic bronchitis (CMS/HCC)   • ASCVD (arteriosclerotic cardiovascular disease), apparently nonobstructive, clinically stable.   • Peripheral arterial disease, involving both lower extremities with bilateral leg claudication.   • Chronic fatigue   • PAD (peripheral artery disease) (CMS/Formerly [facility])   • Atrial flutter (CMS/Formerly [facility])       Dear Marta Davis, APRN:    Subjective     Carlos Carson is a 55 y.o. male with the problems as listed above, presents    Chief Complaint   Patient presents with   • Results     event monitor findings   • Shortness of Breath     \"some\"   • Med Management     list provided       History of Present Illness: Mr. Carson is a pleasant 55-year-old  male with recent diagnosis of atrial flutter/fibrillation with rapid ventricular rates at times noted on the event monitor.  He was initiated on metoprolol and Eliquis.  He is being followed today for the same.  On further questioning he denies any significant palpitations, dizziness or syncope.  He does feel short of breath when he is in atrial fibrillation and feels tired as well.  He denies any bleeding problems with Eliquis.    Carlos Carson  Echo Complete w/Doppler and Color Flow  Order# [id number]  Reading physician:   Kye Rubio MD Ordering physician: Arron Beltran MD Study date:   20   Patient Information    Patient Name   Carlos Carson MRN   [id number] Sex   Male  (Age)   1965 (55 y.o.)     · The study is technically difficult for diagnosis with poor acoustic windows in the parasternal " window, apical window and subcostal window.  · Left ventricular ejection fraction appears to be 56 - 60%. Left ventricular systolic function is normal.  · Left ventricular diastolic function is consistent with (grade I) impaired relaxation.  · No significant functional valvular abnormalities noted  · There is a small (<1cm) pericardial effusion. There is evidence of a fat pad present. This was present in the previous study of 10/2018         Carlos Carson  Regadenoson Stress Test With Myocardial Perfusion SPECT (Multi Study)  Order# [id number]  Reading physician: Mohit Gonzales MD Ordering physician: Cassi Rushing APRN Study date: 19   Patient Information    Patient Name   Carlos Carson MRN   [id number] Sex   Male  (Age)   1965 (55 y.o.)   Interpretation Summary    · A pharmacological stress test was performed using regadenoson  · Findings consistent with a normal ECG stress test.  · Myocardial perfusion imaging indicates a normal myocardial perfusion study with no evidence of ischemia.  · TID:1.35  · Normal LV cavity size. Normal LV wall motion noted.  · Left ventricular ejection fraction is normal (Calculated EF = 56%).  · Impressions are consistent with a low risk study.            Allergies   Allergen Reactions   • Lisinopril Cough   :      Current Outpatient Medications:   •  apixaban (ELIQUIS) 5 MG tablet tablet, Take 5 mg by mouth 2 (Two) Times a Day., Disp: , Rfl:   •  buPROPion SR (WELLBUTRIN SR) 150 MG 12 hr tablet, Take 150 mg by mouth 2 (Two) Times a Day., Disp: , Rfl:   •  clopidogrel (PLAVIX) 75 MG tablet, Take 1 tablet by mouth Daily., Disp: 30 tablet, Rfl: 11  •  gabapentin (NEURONTIN) 800 MG tablet, Take 800 mg by mouth 3 (Three) Times a Day As Needed (nerve pain)., Disp: , Rfl: 0  •  glipizide (GLUCOTROL) 5 MG tablet, Take 5 mg by mouth 2 (Two) Times a Day Before Meals., Disp: , Rfl:   •  HYDROcodone-acetaminophen (NORCO) 7.5-325 MG per tablet, Take 1 tablet by mouth 2 (Two) Times  "a Day As Needed for Moderate Pain ., Disp: , Rfl: 0  •  levothyroxine (SYNTHROID, LEVOTHROID) 50 MCG tablet, Take 50 mcg by mouth Daily. New prescription, Disp: , Rfl:   •  losartan (Cozaar) 25 MG tablet, Take 1 tablet by mouth Daily., Disp: 30 tablet, Rfl: 3  •  metFORMIN (GLUCOPHAGE) 500 MG tablet, Take 500 mg by mouth 2 (Two) Times a Day With Meals., Disp: , Rfl:   •  metoprolol tartrate (LOPRESSOR) 50 MG tablet, Take 2.5 tablets by mouth Every 12 (Twelve) Hours for 30 days., Disp: 150 tablet, Rfl: 3  •  rosuvastatin (CRESTOR) 10 MG tablet, Take 1 tablet by mouth Daily., Disp: 30 tablet, Rfl: 11  •  flecainide (TAMBOCOR) 50 MG tablet, Take 1 tablet by mouth 2 (Two) Times a Day., Disp: 60 tablet, Rfl: 11    Current Facility-Administered Medications:   •  famotidine (PEPCID) tablet 40 mg, 40 mg, Oral, Daily, Mohit Gonzales MD      The following portions of the patient's history were reviewed and updated as appropriate: allergies, current medications, past family history, past medical history, past social history, past surgical history and problem list.    Social History     Tobacco Use   • Smoking status: Former Smoker     Packs/day: 2.00     Years: 46.00     Pack years: 92.00     Types: Cigarettes     Quit date: 12/3/2020     Years since quittin.1   • Smokeless tobacco: Never Used   Substance Use Topics   • Alcohol use: Yes     Alcohol/week: 6.0 standard drinks     Types: 6 Cans of beer per week   • Drug use: No       Review of Systems   Cardiovascular: Negative for chest pain, leg swelling and palpitations.   Respiratory: Positive for shortness of breath.      Objective   Vitals:    21 1448 21 1450   BP: 122/94    Pulse: (!) 128 (!) 126   Resp: 16    Temp: 94.8 °F (34.9 °C)    SpO2: 96%    Weight: 136 kg (300 lb)    Height: 185.4 cm (73\")      Body mass index is 39.58 kg/m².    Vitals signs reviewed.   Constitutional:       Appearance: Well-developed.   Eyes:      Pupils: Pupils are equal, " round, and reactive to light.   Neck:      Musculoskeletal: Neck supple.      Thyroid: No thyromegaly.      Vascular: No JVD.      Trachea: No tracheal deviation.      Lymphadenopathy: No cervical adenopathy.   Pulmonary:      Effort: Pulmonary effort is normal.      Breath sounds: Normal breath sounds.   Cardiovascular:      PMI at left midclavicular line. Normal rate. Regular rhythm. Normal S1. Normal S2.      Murmurs: There is no murmur.      No gallop. No click. No rub.   Pulses:     Intact distal pulses.   Edema:     Peripheral edema absent.   Abdominal:      General: Bowel sounds are normal.      Palpations: Abdomen is soft. There is no abdominal mass.      Tenderness: There is no abdominal tenderness.   Musculoskeletal: Normal range of motion.   Skin:     General: Skin is warm and dry.      Findings: No rash.   Neurological:      Mental Status: Alert and oriented to person, place, and time.         Lab Results   Component Value Date     (L) 12/03/2020    K 4.8 12/03/2020    CL 93 (L) 12/03/2020    CO2 25.9 12/03/2020    BUN 21 (H) 12/03/2020    CREATININE 0.99 12/03/2020    GLUCOSE 517 (C) 12/03/2020    CALCIUM 9.5 12/03/2020    AST 35 12/03/2020    ALT 42 (H) 12/03/2020    ALKPHOS 124 (H) 12/03/2020     No results found for: CKTOTAL  Lab Results   Component Value Date    WBC 8.71 12/03/2020    HGB 16.0 12/03/2020    HCT 46.1 12/03/2020     12/03/2020     Lab Results   Component Value Date    INR 1.00 12/03/2020     Lab Results   Component Value Date    MG 2.0 12/04/2020     Lab Results   Component Value Date    TSH 3.590 12/04/2020    TRIG 742 (H) 12/04/2020    HDL 25 (L) 12/04/2020    LDL 21 12/04/2020        Assessment/Plan :   Diagnosis Plan   1. Paroxysmal atrial fibrillation.    2. Paroxysmal atrial flutter.    3. Essential hypertension     4. Peripheral arterial disease.        Recommendations:  1. Increase metoprolol to 125 mg twice a day.  (2-1/2 tablets of 50 mg twice a day)  2. And  flecainide 50 mg p.o. twice daily.  3. If he remains in atrial fibrillation then will consider elective electrical cardioversion next week.  4. Follow-up next Monday.    Return in about 4 days (around 2/8/2021).    As always, Marta  I appreciate very much the opportunity to participate in the cardiovascular care of your patients. Please do not hesitate to call me with any questions with regards to Carlos Carson's evaluation and management.       With Best Regards,        Mohit Gonzales MD, [facility]    Please note that portions of this note were completed with a voice recognition program.

## 2021-02-05 RX ORDER — FAMOTIDINE 40 MG/1
40 TABLET, FILM COATED ORAL DAILY
Qty: 30 TABLET | Refills: 5 | Status: SHIPPED | OUTPATIENT
Start: 2021-02-05 | End: 2021-03-04 | Stop reason: HOSPADM

## 2021-02-08 ENCOUNTER — OFFICE VISIT (OUTPATIENT)
Dept: CARDIOLOGY | Facility: CLINIC | Age: 56
End: 2021-02-08

## 2021-02-08 VITALS
WEIGHT: 300.8 LBS | HEIGHT: 73 IN | DIASTOLIC BLOOD PRESSURE: 94 MMHG | HEART RATE: 126 BPM | TEMPERATURE: 96.4 F | BODY MASS INDEX: 39.87 KG/M2 | SYSTOLIC BLOOD PRESSURE: 132 MMHG

## 2021-02-08 DIAGNOSIS — I10 ESSENTIAL HYPERTENSION: ICD-10-CM

## 2021-02-08 DIAGNOSIS — I48.92 PAROXYSMAL ATRIAL FLUTTER (HCC): Primary | ICD-10-CM

## 2021-02-08 PROCEDURE — 99213 OFFICE O/P EST LOW 20 MIN: CPT | Performed by: INTERNAL MEDICINE

## 2021-02-08 PROCEDURE — 93000 ELECTROCARDIOGRAM COMPLETE: CPT | Performed by: INTERNAL MEDICINE

## 2021-02-08 NOTE — PROGRESS NOTES
Marta Davis, APRN  Carlos Carson  1965 02/08/2021    Patient Active Problem List   Diagnosis   • Essential hypertension   • Coronary artery disease without angina pectoris, non-hemodynamically significant   • PVD (peripheral vascular disease) (CMS/HCC)   • Dyslipidemia   • Tobacco abuse   • Simple chronic bronchitis (CMS/HCC)   • Obesity (BMI 30-39.9)   • Cough due to ACE inhibitor (Lisinopril)   • Chronic bronchitis (CMS/HCC)   • ASCVD (arteriosclerotic cardiovascular disease), apparently nonobstructive, clinically stable.   • Peripheral arterial disease, involving both lower extremities with bilateral leg claudication.   • Chronic fatigue   • PAD (peripheral artery disease) (CMS/HCC)   • Atrial flutter (CMS/[facility])       Dear Marta Davis, APRN:    Subjective     Carlos Carson is a 55 y.o. male with the problems as listed above, presents    Chief complaint: Follow-up of persistent atrial flutter    History of Present Illness: Ms. Carson is a pleasant 54-year-old  male with recent onset atrial flutter with a rapid ventricular rate.  He was recently started on flecainide 50 mg p.o. twice daily along with metoprolol 125 mg p.o. twice daily.  He is here to discuss about electrical cardioversion since he continues to be in atrial flutter.  He has some intermittent palpitations with no dizziness or syncope.  He states his been taking his Eliquis consistently without interruption for the last couple of months.  No bleeding issues from Eliquis.    Allergies   Allergen Reactions   • Lisinopril Cough       Current Outpatient Medications:   •  apixaban (ELIQUIS) 5 MG tablet tablet, Take 5 mg by mouth 2 (Two) Times a Day., Disp: , Rfl:   •  buPROPion SR (WELLBUTRIN SR) 150 MG 12 hr tablet, Take 150 mg by mouth 2 (Two) Times a Day., Disp: , Rfl:   •  clopidogrel (PLAVIX) 75 MG tablet, Take 1 tablet by mouth Daily., Disp: 30 tablet, Rfl: 11  •  famotidine (Pepcid) 40 MG tablet, Take 1 tablet by  mouth Daily., Disp: 30 tablet, Rfl: 5  •  flecainide (TAMBOCOR) 50 MG tablet, Take 1 tablet by mouth 2 (Two) Times a Day., Disp: 60 tablet, Rfl: 11  •  gabapentin (NEURONTIN) 800 MG tablet, Take 800 mg by mouth 3 (Three) Times a Day As Needed (nerve pain)., Disp: , Rfl: 0  •  glipizide (GLUCOTROL) 5 MG tablet, Take 5 mg by mouth 2 (Two) Times a Day Before Meals., Disp: , Rfl:   •  HYDROcodone-acetaminophen (NORCO) 7.5-325 MG per tablet, Take 1 tablet by mouth 2 (Two) Times a Day As Needed for Moderate Pain ., Disp: , Rfl: 0  •  levothyroxine (SYNTHROID, LEVOTHROID) 50 MCG tablet, Take 50 mcg by mouth Daily. New prescription, Disp: , Rfl:   •  losartan (Cozaar) 25 MG tablet, Take 1 tablet by mouth Daily., Disp: 30 tablet, Rfl: 3  •  metFORMIN (GLUCOPHAGE) 500 MG tablet, Take 500 mg by mouth 2 (Two) Times a Day With Meals., Disp: , Rfl:   •  metoprolol tartrate (LOPRESSOR) 50 MG tablet, Take 2.5 tablets by mouth Every 12 (Twelve) Hours for 30 days., Disp: 150 tablet, Rfl: 3  •  rosuvastatin (CRESTOR) 10 MG tablet, Take 1 tablet by mouth Daily., Disp: 30 tablet, Rfl: 11      The following portions of the patient's history were reviewed and updated as appropriate: allergies, current medications, past family history, past medical history, past social history, past surgical history and problem list.    Social History     Tobacco Use   • Smoking status: Former Smoker     Packs/day: 2.00     Years: 46.00     Pack years: 92.00     Types: Cigarettes     Quit date: 12/3/2020     Years since quittin.1   • Smokeless tobacco: Never Used   Substance Use Topics   • Alcohol use: Yes     Alcohol/week: 6.0 standard drinks     Types: 6 Cans of beer per week   • Drug use: No       Review of Systems   Constitution: Negative for chills and fever.   HENT: Negative for nosebleeds and sore throat.    Cardiovascular: Positive for palpitations.   Respiratory: Negative for cough, hemoptysis and wheezing.    Gastrointestinal: Negative for  "abdominal pain, hematemesis, hematochezia, melena, nausea and vomiting.   Genitourinary: Negative for dysuria and hematuria.   Neurological: Negative for headaches.     Objective   Vitals:    02/08/21 1101 02/08/21 1106   BP: 141/100 132/94   BP Location: Left arm Right arm   Patient Position: Sitting Sitting   Cuff Size: Large Adult Large Adult   Pulse: (!) 123 (!) 126   Temp: 96.4 °F (35.8 °C)    Weight: (!) 136 kg (300 lb 12.8 oz)    Height: 185.4 cm (73\")      Body mass index is 39.69 kg/m².    Constitutional:       Appearance: Well-developed.   Eyes:      Conjunctiva/sclera: Conjunctivae normal.   HENT:      Head: Normocephalic.   Neck:      Musculoskeletal: Normal range of motion and neck supple.      Thyroid: No thyromegaly.      Vascular: No JVD.      Trachea: No tracheal deviation.   Pulmonary:      Effort: No respiratory distress.      Breath sounds: Normal breath sounds. No wheezing. No rales.   Cardiovascular:      PMI at left midclavicular line. Tachycardia present. Regular rhythm. Normal S1. Normal S2.      Murmurs: There is no murmur.      No gallop. No click. No rub.   Pulses:     Intact distal pulses.   Edema:     Peripheral edema absent.   Abdominal:      General: Bowel sounds are normal.      Palpations: Abdomen is soft. There is no abdominal mass.      Tenderness: There is no abdominal tenderness.   Skin:     General: Skin is warm and dry.   Neurological:      Mental Status: Alert and oriented to person, place, and time.      Cranial Nerves: No cranial nerve deficit.         Lab Results   Component Value Date     (L) 12/03/2020    K 4.8 12/03/2020    CL 93 (L) 12/03/2020    CO2 25.9 12/03/2020    BUN 21 (H) 12/03/2020    CREATININE 0.99 12/03/2020    GLUCOSE 517 (C) 12/03/2020    CALCIUM 9.5 12/03/2020    AST 35 12/03/2020    ALT 42 (H) 12/03/2020    ALKPHOS 124 (H) 12/03/2020     No results found for: CKTOTAL  Lab Results   Component Value Date    WBC 8.71 12/03/2020    HGB 16.0 12/03/2020 "    HCT 46.1 12/03/2020     12/03/2020     Lab Results   Component Value Date    INR 1.00 12/03/2020     Lab Results   Component Value Date    MG 2.0 12/04/2020     Lab Results   Component Value Date    TSH 3.590 12/04/2020    TRIG 742 (H) 12/04/2020    HDL 25 (L) 12/04/2020    LDL 21 12/04/2020         ECG 12 Lead    Date/Time: 2/8/2021 11:57 AM  Performed by: Mohit Gonzales MD  Authorized by: Mohit Gonzales MD   Comparison: compared with previous ECG from 12/4/2020  Comparison to previous ECG: Patient was in sinus rhythm on the previous EKG  Rhythm: atrial flutter  BPM: 115  Other findings: non-specific ST-T wave changes                Assessment/Plan :   Diagnosis Plan   1. Paroxysmal atrial flutter (CMS/HCC)     2. Essential hypertension          Recommendations:  I discussed with him about the option of electrocardioversion and he is agreeable.  We will try to schedule this for 2/11/2020.    Return in about 5 weeks (around 3/15/2021).    As always,  Marta  I appreciate very much the opportunity to participate in the cardiovascular care of your patients. Please do not hesitate to call me with any questions with regards to Carlos Carson's evaluation and management.       With Best Regards,        Mohit Gonzales MD, [facility]    Please note that portions of this note were completed with a voice recognition program.

## 2021-02-09 ENCOUNTER — LAB (OUTPATIENT)
Dept: LAB | Facility: HOSPITAL | Age: 56
End: 2021-02-09

## 2021-02-09 ENCOUNTER — TRANSCRIBE ORDERS (OUTPATIENT)
Dept: ADMINISTRATIVE | Facility: HOSPITAL | Age: 56
End: 2021-02-09

## 2021-02-09 DIAGNOSIS — Z01.818 PREOP EXAMINATION: Primary | ICD-10-CM

## 2021-02-09 DIAGNOSIS — Z01.818 PREOP EXAMINATION: ICD-10-CM

## 2021-02-09 PROCEDURE — U0005 INFEC AGEN DETEC AMPLI PROBE: HCPCS

## 2021-02-09 PROCEDURE — U0004 COV-19 TEST NON-CDC HGH THRU: HCPCS

## 2021-02-09 PROCEDURE — C9803 HOPD COVID-19 SPEC COLLECT: HCPCS

## 2021-02-10 ENCOUNTER — TELEPHONE (OUTPATIENT)
Dept: CARDIOLOGY | Facility: CLINIC | Age: 56
End: 2021-02-10

## 2021-02-10 DIAGNOSIS — I48.0 PAROXYSMAL ATRIAL FIBRILLATION (HCC): Primary | ICD-10-CM

## 2021-02-10 LAB — SARS-COV-2 RNA RESP QL NAA+PROBE: NOT DETECTED

## 2021-02-10 NOTE — TELEPHONE ENCOUNTER
Patient called and said that his pharmacy said they had sent over for his medicine. He needs:     Levothyroxine and Eliquis called in.     Thanks!

## 2021-02-19 DIAGNOSIS — E78.5 DYSLIPIDEMIA: ICD-10-CM

## 2021-02-19 RX ORDER — ROSUVASTATIN CALCIUM 10 MG/1
10 TABLET, COATED ORAL DAILY
Qty: 30 TABLET | Refills: 11 | Status: SHIPPED | OUTPATIENT
Start: 2021-02-19 | End: 2021-09-01 | Stop reason: SDUPTHER

## 2021-02-23 ENCOUNTER — TRANSCRIBE ORDERS (OUTPATIENT)
Dept: ADMINISTRATIVE | Facility: HOSPITAL | Age: 56
End: 2021-02-23

## 2021-02-23 DIAGNOSIS — Z01.818 OTHER SPECIFIED PRE-OPERATIVE EXAMINATION: Primary | ICD-10-CM

## 2021-02-26 ENCOUNTER — TRANSCRIBE ORDERS (OUTPATIENT)
Dept: ADMINISTRATIVE | Facility: HOSPITAL | Age: 56
End: 2021-02-26

## 2021-02-26 DIAGNOSIS — Z01.818 PRE-OPERATIVE CLEARANCE: Primary | ICD-10-CM

## 2021-03-02 ENCOUNTER — TREATMENT (OUTPATIENT)
Dept: CARDIOLOGY | Facility: CLINIC | Age: 56
End: 2021-03-02

## 2021-03-02 ENCOUNTER — LAB (OUTPATIENT)
Dept: LAB | Facility: HOSPITAL | Age: 56
End: 2021-03-02

## 2021-03-02 DIAGNOSIS — Z01.818 PRE-OPERATIVE CLEARANCE: ICD-10-CM

## 2021-03-02 DIAGNOSIS — I48.3 TYPICAL ATRIAL FLUTTER (HCC): Primary | ICD-10-CM

## 2021-03-02 LAB — SARS-COV-2 RNA RESP QL NAA+PROBE: NOT DETECTED

## 2021-03-02 PROCEDURE — C9803 HOPD COVID-19 SPEC COLLECT: HCPCS

## 2021-03-02 PROCEDURE — 93228 REMOTE 30 DAY ECG REV/REPORT: CPT | Performed by: INTERNAL MEDICINE

## 2021-03-02 PROCEDURE — U0004 COV-19 TEST NON-CDC HGH THRU: HCPCS

## 2021-03-02 PROCEDURE — U0005 INFEC AGEN DETEC AMPLI PROBE: HCPCS

## 2021-03-04 ENCOUNTER — HOSPITAL ENCOUNTER (OUTPATIENT)
Dept: CARDIOLOGY | Facility: HOSPITAL | Age: 56
Discharge: HOME | End: 2021-03-04

## 2021-03-04 ENCOUNTER — TELEPHONE (OUTPATIENT)
Dept: CARDIOLOGY | Facility: CLINIC | Age: 56
End: 2021-03-04

## 2021-03-04 ENCOUNTER — ANESTHESIA (OUTPATIENT)
Dept: CARDIOLOGY | Facility: HOSPITAL | Age: 56
End: 2021-03-04

## 2021-03-04 ENCOUNTER — ANESTHESIA EVENT (OUTPATIENT)
Dept: CARDIOLOGY | Facility: HOSPITAL | Age: 56
End: 2021-03-04

## 2021-03-04 VITALS
RESPIRATION RATE: 16 BRPM | SYSTOLIC BLOOD PRESSURE: 117 MMHG | TEMPERATURE: 98 F | DIASTOLIC BLOOD PRESSURE: 86 MMHG | WEIGHT: 297 LBS | BODY MASS INDEX: 39.36 KG/M2 | HEART RATE: 87 BPM | HEIGHT: 73 IN | OXYGEN SATURATION: 94 %

## 2021-03-04 DIAGNOSIS — I48.92 PAROXYSMAL ATRIAL FLUTTER (HCC): ICD-10-CM

## 2021-03-04 LAB
QT INTERVAL: 364 MS
QT INTERVAL: 382 MS
QTC INTERVAL: 467 MS
QTC INTERVAL: 547 MS

## 2021-03-04 PROCEDURE — 93005 ELECTROCARDIOGRAM TRACING: CPT | Performed by: INTERNAL MEDICINE

## 2021-03-04 PROCEDURE — 92960 CARDIOVERSION ELECTRIC EXT: CPT | Performed by: INTERNAL MEDICINE

## 2021-03-04 PROCEDURE — 92960 CARDIOVERSION ELECTRIC EXT: CPT

## 2021-03-04 PROCEDURE — 25010000002 PROPOFOL 10 MG/ML EMULSION: Performed by: NURSE ANESTHETIST, CERTIFIED REGISTERED

## 2021-03-04 RX ORDER — PANTOPRAZOLE SODIUM 40 MG/1
40 TABLET, DELAYED RELEASE ORAL DAILY
Qty: 30 TABLET | Refills: 2 | Status: SHIPPED | OUTPATIENT
Start: 2021-03-04

## 2021-03-04 RX ORDER — SODIUM CHLORIDE 9 MG/ML
INJECTION, SOLUTION INTRAVENOUS CONTINUOUS PRN
Status: DISCONTINUED | OUTPATIENT
Start: 2021-03-04 | End: 2021-03-04 | Stop reason: SURG

## 2021-03-04 RX ORDER — PROPOFOL 10 MG/ML
VIAL (ML) INTRAVENOUS AS NEEDED
Status: DISCONTINUED | OUTPATIENT
Start: 2021-03-04 | End: 2021-03-04 | Stop reason: SURG

## 2021-03-04 RX ORDER — PANTOPRAZOLE SODIUM 40 MG/1
40 TABLET, DELAYED RELEASE ORAL DAILY
Qty: 30 TABLET | Refills: 3 | Status: SHIPPED | OUTPATIENT
Start: 2021-03-04

## 2021-03-04 RX ADMIN — SODIUM CHLORIDE: 9 INJECTION, SOLUTION INTRAVENOUS at 08:46

## 2021-03-04 RX ADMIN — PROPOFOL 70 MG: 10 INJECTION, EMULSION INTRAVENOUS at 08:49

## 2021-03-04 NOTE — ANESTHESIA POSTPROCEDURE EVALUATION
Patient: Carlos Carson    Procedure Summary     Date: 03/04/21 Room / Location: [facility]    Anesthesia Start: 0846 Anesthesia Stop: 0852    Procedure: CARDIOVERSION EXTERNAL IN CARDIOLOGY DEPARTMENT Diagnosis:       Paroxysmal atrial flutter (CMS/HCC)      (Persistent atrial flutter)    Scheduled Providers: Mohit Gonzales MD Provider: Ishmael Boothe MD    Anesthesia Type: general ASA Status: 3          Anesthesia Type: general    Vitals  No vitals data found for the desired time range.          Post Anesthesia Care and Evaluation    Patient location during evaluation: PHASE II  Patient participation: complete - patient participated  Level of consciousness: awake and alert  Pain score: 1  Pain management: adequate  Airway patency: patent  Anesthetic complications: No anesthetic complications  PONV Status: controlled  Cardiovascular status: acceptable  Respiratory status: acceptable  Hydration status: acceptable

## 2021-03-04 NOTE — TELEPHONE ENCOUNTER
Called pt and he was needing to clarify which Metoprolol he is suppose to take he stated he has the Tartrate and Succ one at home. I advised him that him that he Metoprolol tart on file for him to take. He expressed understanding and also wanted to know if he is suppose to be taking losartan.

## 2021-03-04 NOTE — ANESTHESIA PREPROCEDURE EVALUATION
Anesthesia Evaluation     Patient summary reviewed and Nursing notes reviewed                Airway   Mallampati: II  TM distance: <3 FB  Neck ROM: full  No difficulty expected  Dental - normal exam     Pulmonary - negative pulmonary ROS and normal exam    breath sounds clear to auscultation  Cardiovascular     Rhythm: irregular  Rate: normal    (+) hypertension, CAD, dysrhythmias Atrial Flutter, Atrial Fib, PVD, hyperlipidemia,       Neuro/Psych- negative ROS  GI/Hepatic/Renal/Endo    (+) obesity, morbid obesity,  diabetes mellitus well controlled,     Musculoskeletal (-) negative ROS    Abdominal  - normal exam    Bowel sounds: normal.   Substance History - negative use     OB/GYN negative ob/gyn ROS         Other                        Anesthesia Plan    ASA 3     general       Anesthetic plan, all risks, benefits, and alternatives have been provided, discussed and informed consent has been obtained with: patient.  Use of blood products discussed with patient .   Plan discussed with attending.

## 2021-04-19 RX ORDER — LOSARTAN POTASSIUM 25 MG/1
TABLET ORAL
Qty: 30 TABLET | Refills: 3 | Status: SHIPPED | OUTPATIENT
Start: 2021-04-19 | End: 2021-06-01

## 2021-05-25 RX ORDER — PANTOPRAZOLE SODIUM 40 MG/1
TABLET, DELAYED RELEASE ORAL
Qty: 30 TABLET | Refills: 3 | OUTPATIENT
Start: 2021-05-25

## 2021-06-01 ENCOUNTER — OFFICE VISIT (OUTPATIENT)
Dept: CARDIOLOGY | Facility: CLINIC | Age: 56
End: 2021-06-01

## 2021-06-01 ENCOUNTER — TELEPHONE (OUTPATIENT)
Dept: CARDIOLOGY | Facility: CLINIC | Age: 56
End: 2021-06-01

## 2021-06-01 VITALS
WEIGHT: 294.4 LBS | OXYGEN SATURATION: 96 % | HEART RATE: 71 BPM | SYSTOLIC BLOOD PRESSURE: 151 MMHG | HEIGHT: 73 IN | BODY MASS INDEX: 39.02 KG/M2 | TEMPERATURE: 97 F | DIASTOLIC BLOOD PRESSURE: 88 MMHG

## 2021-06-01 DIAGNOSIS — E11.9 TYPE 2 DIABETES MELLITUS WITHOUT COMPLICATION, WITHOUT LONG-TERM CURRENT USE OF INSULIN (HCC): ICD-10-CM

## 2021-06-01 DIAGNOSIS — E78.5 DYSLIPIDEMIA: ICD-10-CM

## 2021-06-01 DIAGNOSIS — I10 ESSENTIAL HYPERTENSION: Primary | ICD-10-CM

## 2021-06-01 DIAGNOSIS — Z87.891 FORMER SMOKER: ICD-10-CM

## 2021-06-01 DIAGNOSIS — I25.10 ASCVD (ARTERIOSCLEROTIC CARDIOVASCULAR DISEASE): ICD-10-CM

## 2021-06-01 DIAGNOSIS — I48.92 PAROXYSMAL ATRIAL FLUTTER (HCC): ICD-10-CM

## 2021-06-01 DIAGNOSIS — I73.9 PERIPHERAL ARTERIAL DISEASE (HCC): ICD-10-CM

## 2021-06-01 PROCEDURE — 99214 OFFICE O/P EST MOD 30 MIN: CPT | Performed by: NURSE PRACTITIONER

## 2021-06-01 RX ORDER — LOSARTAN POTASSIUM 50 MG/1
50 TABLET ORAL DAILY
Qty: 30 TABLET | Refills: 5 | Status: SHIPPED | OUTPATIENT
Start: 2021-06-01 | End: 2021-09-01 | Stop reason: SDUPTHER

## 2021-06-01 NOTE — TELEPHONE ENCOUNTER
Patient called because his pharmacy has been filling metoprolol tartate and succ both and he was confused abut this.  He states he is supposed to be taking the tartate and ask if something has  changed he isn't aware of     Per his chart he is supposed to be on the tartate.  The succinate has not been prescribed in a year. Patient will discuss further a this follow up today

## 2021-06-01 NOTE — PROGRESS NOTES
Brenda Gallagher APRN  Carlos Carsno  1965 06/01/2021    Patient Active Problem List   Diagnosis   • Essential hypertension   • Coronary artery disease without angina pectoris, non-hemodynamically significant   • PVD (peripheral vascular disease) (CMS/[facility])   • Dyslipidemia   • Tobacco abuse   • Simple chronic bronchitis (CMS/HCC)   • Obesity (BMI 30-39.9)   • Cough due to ACE inhibitor (Lisinopril)   • Chronic bronchitis (CMS/HCC)   • ASCVD (arteriosclerotic cardiovascular disease), apparently nonobstructive, clinically stable.   • Peripheral arterial disease, involving both lower extremities with bilateral leg claudication.   • Chronic fatigue   • PAD (peripheral artery disease) (CMS/[facility])   • Atrial flutter (CMS/[facility])       Dear Brenda Gallagher APRN:    Subjective     Chief Complaint   Patient presents with   • cardioversion     follow up           History of Present Illness:    Carlos Carson is a 55 y.o. male with a past medical history of nonobstructive coronary artery disease, dyslipidemia, hypertension, severe peripheral arterial disease status post arteriogram and intervention of bilateral lower extremities in November 2018 and again in January 2019, COPD, tobacco abuse, and recently diagnosed diabetes mellitus type 2. He also has a history of atrial flutter with RVR status post electrical cardioversion on 3/4/2021.  He was also placed on flecainide at that time.  He reports since then he has not had any palpitations.  He denies any chest pains or shortness of breath.  States he quit smoking in December 2020.  He did smoke on average 2 packs/day for 46 years.  He denies any bleeding issues with Eliquis.  He is working on blood sugar control and triglycerides are gradually improving although still elevated at 536.  He is concerned because his blood pressure has been elevated at home and is also elevated in the office today.  In addition he is concerned about his risk of cancer given his smoking  history.          Allergies   Allergen Reactions   • Lisinopril Cough   :      Current Outpatient Medications:   •  apixaban (ELIQUIS) 5 MG tablet tablet, Take 1 tablet by mouth Every 12 (Twelve) Hours., Disp: 60 tablet, Rfl: 5  •  buPROPion SR (WELLBUTRIN SR) 150 MG 12 hr tablet, Take 150 mg by mouth 2 (Two) Times a Day., Disp: , Rfl:   •  clopidogrel (PLAVIX) 75 MG tablet, Take 1 tablet by mouth Daily., Disp: 30 tablet, Rfl: 11  •  flecainide (TAMBOCOR) 50 MG tablet, Take 1 tablet by mouth 2 (Two) Times a Day., Disp: 60 tablet, Rfl: 11  •  gabapentin (NEURONTIN) 800 MG tablet, Take 800 mg by mouth 3 (Three) Times a Day As Needed (nerve pain)., Disp: , Rfl: 0  •  glipizide (GLUCOTROL) 5 MG tablet, Take 5 mg by mouth 2 (Two) Times a Day Before Meals., Disp: , Rfl:   •  HYDROcodone-acetaminophen (NORCO) 7.5-325 MG per tablet, Take 1 tablet by mouth 2 (Two) Times a Day As Needed for Moderate Pain ., Disp: , Rfl: 0  •  levothyroxine (SYNTHROID, LEVOTHROID) 50 MCG tablet, Take 50 mcg by mouth Daily. New prescription, Disp: , Rfl:   •  losartan (COZAAR) 50 MG tablet, Take 1 tablet by mouth Daily., Disp: 30 tablet, Rfl: 5  •  metFORMIN (GLUCOPHAGE) 500 MG tablet, Take 500 mg by mouth 2 (Two) Times a Day With Meals., Disp: , Rfl:   •  pantoprazole (Protonix) 40 MG EC tablet, Take 1 tablet by mouth Daily., Disp: 30 tablet, Rfl: 3  •  pantoprazole (Protonix) 40 MG EC tablet, Take 1 tablet by mouth Daily., Disp: 30 tablet, Rfl: 2  •  rosuvastatin (CRESTOR) 10 MG tablet, Take 1 tablet by mouth Daily., Disp: 30 tablet, Rfl: 11  •  metoprolol tartrate (LOPRESSOR) 50 MG tablet, Take 2.5 tablets by mouth Every 12 (Twelve) Hours for 30 days., Disp: 150 tablet, Rfl: 3      The following portions of the patient's history were reviewed and updated as appropriate: allergies, current medications, past family history, past medical history, past social history, past surgical history and problem list.    Social History     Tobacco Use   •  "Smoking status: Former Smoker     Packs/day: 2.00     Years: 46.00     Pack years: 92.00     Types: Cigarettes     Quit date: 12/3/2020     Years since quittin.4   • Smokeless tobacco: Never Used   Substance Use Topics   • Alcohol use: Yes     Alcohol/week: 6.0 standard drinks     Types: 6 Cans of beer per week   • Drug use: No       Review of Systems   Constitutional: Negative for decreased appetite and malaise/fatigue.   Cardiovascular: Negative for chest pain, dyspnea on exertion, irregular heartbeat, leg swelling, near-syncope, orthopnea, palpitations, paroxysmal nocturnal dyspnea and syncope.   Respiratory: Negative for cough, shortness of breath and wheezing.    Neurological: Negative for dizziness, light-headedness and weakness.       Objective   Vitals:    21 1442   BP: 151/88   BP Location: Left arm   Patient Position: Sitting   Cuff Size: Adult   Pulse: 71   Temp: 97 °F (36.1 °C)   TempSrc: Infrared   SpO2: 96%   Weight: 134 kg (294 lb 6.4 oz)   Height: 185.4 cm (73\")     Body mass index is 38.84 kg/m².        Vitals reviewed.   Constitutional:       Appearance: Healthy appearance. Well-developed and not in distress.   HENT:      Head: Normocephalic and atraumatic.   Pulmonary:      Effort: Pulmonary effort is normal.      Breath sounds: Normal breath sounds. No wheezing. No rales.   Cardiovascular:      Normal rate. Regular rhythm.      Murmurs: There is no murmur.      . No S3 and S4 gallop.   Edema:     Peripheral edema absent.   Abdominal:      General: Bowel sounds are normal.      Palpations: Abdomen is soft.   Skin:     General: Skin is warm and dry.   Neurological:      Mental Status: Alert, oriented to person, place, and time and oriented to person, place and time.   Psychiatric:         Mood and Affect: Mood normal.         Behavior: Behavior normal.         Lab Results   Component Value Date     (L) 2020    K 4.8 2020    CL 93 (L) 2020    CO2 25.9 2020    " BUN 21 (H) 12/03/2020    CREATININE 0.99 12/03/2020    GLUCOSE 517 (C) 12/03/2020    CALCIUM 9.5 12/03/2020    AST 35 12/03/2020    ALT 42 (H) 12/03/2020    ALKPHOS 124 (H) 12/03/2020     No results found for: CKTOTAL  Lab Results   Component Value Date    WBC 8.71 12/03/2020    HGB 16.0 12/03/2020    HCT 46.1 12/03/2020     12/03/2020     Lab Results   Component Value Date    INR 1.00 12/03/2020     Lab Results   Component Value Date    MG 2.0 12/04/2020     Lab Results   Component Value Date    TSH 3.590 12/04/2020    TRIG 742 (H) 12/04/2020    HDL 25 (L) 12/04/2020    LDL 21 12/04/2020      No results found for: BNP        Procedures      Assessment/Plan    Diagnosis Plan   1. Essential hypertension  losartan (COZAAR) 50 MG tablet    Basic Metabolic Panel   2. Paroxysmal atrial flutter (CMS/HCC)     3. Dyslipidemia     4. Peripheral arterial disease (CMS/[facility])     5. ASCVD (arteriosclerotic cardiovascular disease), apparently nonobstructive, clinically stable.     6. Type 2 diabetes mellitus without complication, without long-term current use of insulin (CMS/[facility])     7. Former smoker  CT chest low dose wo                Recommendations:    1. Essential hypertension-we will increase losartan to 50 mg daily.  I did ask him to continue to monitor his blood pressure with goal of 130 systolic and 80s diastolic.  BMP in 1 week.  2. Paroxysmal atrial flutter-maintaining sinus rhythm on flecainide and metoprolol.  Anticoagulated with Eliquis.  3. Dyslipidemia-on statin therapy.  4. Peripheral arterial disease-continue Plavix and Crestor.  5. Nonobstructive ASCVD-no recent anginal symptoms.  Continue Plavix, Crestor, losartan, and metoprolol.  6. Diabetes mellitus type II-we did have a long discussion about glucose control today.  He is going to follow-up with his PCP.  I encourage medication compliance.  7. Former smoker-I have applauded his efforts of smoking cessation.  I have ordered a low-dose screening lung CT  given his pack-year history of 92.  8. Follow-up in 3 months or sooner if needed.        Return in about 3 months (around 9/1/2021) for Recheck.    As always, I appreciate very much the opportunity to participate in the cardiovascular care of your patients.      With Best Regards,    ENID Davenport

## 2021-06-04 ENCOUNTER — TELEPHONE (OUTPATIENT)
Dept: CARDIOLOGY | Facility: CLINIC | Age: 56
End: 2021-06-04

## 2021-06-04 NOTE — TELEPHONE ENCOUNTER
----- Message from ENID Davenport sent at 6/1/2021  2:59 PM EDT -----  He is taking metoprolol tartrate. Please call [facility] pharmacy and advise them to d/c the prescription for metoprolol succinate. Thanks.    DONE

## 2021-06-15 ENCOUNTER — HOSPITAL ENCOUNTER (OUTPATIENT)
Dept: CT IMAGING | Facility: HOSPITAL | Age: 56
Discharge: HOME | End: 2021-06-15
Admitting: NURSE PRACTITIONER

## 2021-06-15 DIAGNOSIS — Z87.891 FORMER SMOKER: ICD-10-CM

## 2021-06-15 PROCEDURE — 71271 CT THORAX LUNG CANCER SCR C-: CPT | Performed by: RADIOLOGY

## 2021-06-15 PROCEDURE — 71271 CT THORAX LUNG CANCER SCR C-: CPT

## 2021-07-06 RX ORDER — FAMOTIDINE 40 MG/1
TABLET, FILM COATED ORAL
Qty: 30 TABLET | Refills: 5 | OUTPATIENT
Start: 2021-07-06

## 2021-07-07 DIAGNOSIS — I48.0 PAROXYSMAL ATRIAL FIBRILLATION (HCC): ICD-10-CM

## 2021-07-07 DIAGNOSIS — I10 ESSENTIAL HYPERTENSION: ICD-10-CM

## 2021-07-07 DIAGNOSIS — I25.10 ASCVD (ARTERIOSCLEROTIC CARDIOVASCULAR DISEASE): ICD-10-CM

## 2021-07-08 RX ORDER — METOPROLOL TARTRATE 50 MG/1
TABLET, FILM COATED ORAL
Qty: 150 TABLET | Refills: 3 | OUTPATIENT
Start: 2021-07-08

## 2021-07-08 RX ORDER — METOPROLOL SUCCINATE 50 MG/1
50 TABLET, EXTENDED RELEASE ORAL DAILY
Qty: 30 TABLET | Refills: 5 | OUTPATIENT
Start: 2021-07-08

## 2021-07-08 NOTE — TELEPHONE ENCOUNTER
Is she taking both metoprolol tartrate and succinate?  She should only be 1,  I prefer her to be on metoprolol succinate.

## 2021-08-20 ENCOUNTER — TELEPHONE (OUTPATIENT)
Dept: CARDIOLOGY | Facility: CLINIC | Age: 56
End: 2021-08-20

## 2021-08-20 RX ORDER — METOPROLOL TARTRATE 50 MG/1
125 TABLET, FILM COATED ORAL EVERY 12 HOURS SCHEDULED
Qty: 150 TABLET | Refills: 3 | Status: SHIPPED | OUTPATIENT
Start: 2021-08-20 | End: 2021-09-01 | Stop reason: SDUPTHER

## 2021-09-01 ENCOUNTER — OFFICE VISIT (OUTPATIENT)
Dept: CARDIOLOGY | Facility: CLINIC | Age: 56
End: 2021-09-01

## 2021-09-01 VITALS
TEMPERATURE: 96 F | BODY MASS INDEX: 36.55 KG/M2 | DIASTOLIC BLOOD PRESSURE: 85 MMHG | HEIGHT: 73 IN | SYSTOLIC BLOOD PRESSURE: 136 MMHG | WEIGHT: 275.8 LBS | HEART RATE: 72 BPM

## 2021-09-01 DIAGNOSIS — E78.5 DYSLIPIDEMIA: ICD-10-CM

## 2021-09-01 DIAGNOSIS — I48.3 TYPICAL ATRIAL FLUTTER (HCC): ICD-10-CM

## 2021-09-01 DIAGNOSIS — I73.9 PAD (PERIPHERAL ARTERY DISEASE) (HCC): ICD-10-CM

## 2021-09-01 DIAGNOSIS — I25.10 ASCVD (ARTERIOSCLEROTIC CARDIOVASCULAR DISEASE): Primary | ICD-10-CM

## 2021-09-01 DIAGNOSIS — I48.0 PAROXYSMAL ATRIAL FIBRILLATION (HCC): ICD-10-CM

## 2021-09-01 DIAGNOSIS — I10 ESSENTIAL HYPERTENSION: ICD-10-CM

## 2021-09-01 PROCEDURE — 99213 OFFICE O/P EST LOW 20 MIN: CPT | Performed by: PHYSICIAN ASSISTANT

## 2021-09-01 RX ORDER — METOPROLOL TARTRATE 50 MG/1
125 TABLET, FILM COATED ORAL EVERY 12 HOURS SCHEDULED
Qty: 180 TABLET | Refills: 3 | Status: SHIPPED | OUTPATIENT
Start: 2021-09-01 | End: 2021-11-23

## 2021-09-01 RX ORDER — LOSARTAN POTASSIUM 50 MG/1
50 TABLET ORAL DAILY
Qty: 90 TABLET | Refills: 3 | Status: SHIPPED | OUTPATIENT
Start: 2021-09-01 | End: 2022-03-03 | Stop reason: SDUPTHER

## 2021-09-01 RX ORDER — CLOPIDOGREL BISULFATE 75 MG/1
75 TABLET ORAL DAILY
Qty: 90 TABLET | Refills: 3 | Status: SHIPPED | OUTPATIENT
Start: 2021-09-01 | End: 2021-12-22

## 2021-09-01 RX ORDER — ROSUVASTATIN CALCIUM 10 MG/1
10 TABLET, COATED ORAL DAILY
Qty: 90 TABLET | Refills: 3 | Status: SHIPPED | OUTPATIENT
Start: 2021-09-01 | End: 2021-11-15 | Stop reason: SDUPTHER

## 2021-09-01 RX ORDER — FLECAINIDE ACETATE 50 MG/1
50 TABLET ORAL 2 TIMES DAILY
Qty: 180 TABLET | Refills: 3 | Status: SHIPPED | OUTPATIENT
Start: 2021-09-01 | End: 2022-06-23

## 2021-09-01 NOTE — PROGRESS NOTES
Brenda Gallagher, ENID  Carlos Carson  1965 09/01/2021    Patient Active Problem List   Diagnosis   • Essential hypertension   • Coronary artery disease without angina pectoris, non-hemodynamically significant   • PVD (peripheral vascular disease) (CMS/[facility])   • Dyslipidemia   • Tobacco abuse   • Simple chronic bronchitis (CMS/HCC)   • Obesity (BMI 30-39.9)   • Cough due to ACE inhibitor (Lisinopril)   • Chronic bronchitis (CMS/HCC)   • ASCVD (arteriosclerotic cardiovascular disease), apparently nonobstructive, clinically stable.   • Peripheral arterial disease, involving both lower extremities with bilateral leg claudication.   • Chronic fatigue   • PAD (peripheral artery disease) (CMS/[facility])   • Atrial flutter (CMS/[facility])       Dear Brenda Gallagher APRN:    Subjective     History of Present Illness:    Chief Complaint   Patient presents with   • Follow-up     CT CHEST RESULTS       Carlos Carson is a pleasant 56 y.o. male with a past medical history significant for nonobstructive CAD, dyslipidemia, essential hypertension, severe PAD status post arteriogram and intervention of bilateral lower extremities in November 2018 and again in January 2019.  He also has history of diabetes mellitus, tobacco abuse, COPD, atrial flutter status post cardioversion.  He comes in today for routine cardiology follow-up.    Carlos denies any issues with open questions today. Upon further questioning, Patient denies any chest pain, shortness of breath, palpitations, dizziness, syncope or near syncope. He denies any known episodes of atiral flutter. He also denies any bleeding issues with eliquis and plavix.     Allergies   Allergen Reactions   • Lisinopril Cough   :      Current Outpatient Medications:   •  apixaban (ELIQUIS) 5 MG tablet tablet, Take 1 tablet by mouth Every 12 (Twelve) Hours., Disp: 180 tablet, Rfl: 3  •  buPROPion SR (WELLBUTRIN SR) 150 MG 12 hr tablet, Take 150 mg by mouth 2 (Two) Times a Day., Disp: , Rfl:   •   clopidogrel (PLAVIX) 75 MG tablet, Take 1 tablet by mouth Daily., Disp: 90 tablet, Rfl: 3  •  flecainide (TAMBOCOR) 50 MG tablet, Take 1 tablet by mouth 2 (Two) Times a Day., Disp: 180 tablet, Rfl: 3  •  gabapentin (NEURONTIN) 800 MG tablet, Take 800 mg by mouth 3 (Three) Times a Day As Needed (nerve pain)., Disp: , Rfl: 0  •  glipizide (GLUCOTROL) 5 MG tablet, Take 5 mg by mouth 2 (Two) Times a Day Before Meals., Disp: , Rfl:   •  HYDROcodone-acetaminophen (NORCO) 7.5-325 MG per tablet, Take 1 tablet by mouth 2 (Two) Times a Day As Needed for Moderate Pain ., Disp: , Rfl: 0  •  levothyroxine (SYNTHROID, LEVOTHROID) 50 MCG tablet, Take 50 mcg by mouth Daily. New prescription, Disp: , Rfl:   •  losartan (COZAAR) 50 MG tablet, Take 1 tablet by mouth Daily., Disp: 90 tablet, Rfl: 3  •  metFORMIN (GLUCOPHAGE) 500 MG tablet, Take 500 mg by mouth 2 (Two) Times a Day With Meals., Disp: , Rfl:   •  metoprolol tartrate (LOPRESSOR) 50 MG tablet, Take 2.5 tablets by mouth Every 12 (Twelve) Hours for 30 days., Disp: 180 tablet, Rfl: 3  •  pantoprazole (Protonix) 40 MG EC tablet, Take 1 tablet by mouth Daily., Disp: 30 tablet, Rfl: 3  •  pantoprazole (Protonix) 40 MG EC tablet, Take 1 tablet by mouth Daily., Disp: 30 tablet, Rfl: 2  •  rosuvastatin (CRESTOR) 10 MG tablet, Take 1 tablet by mouth Daily., Disp: 90 tablet, Rfl: 3    The following portions of the patient's history were reviewed and updated as appropriate: allergies, current medications, past family history, past medical history, past social history, past surgical history and problem list.    Social History     Tobacco Use   • Smoking status: Former Smoker     Packs/day: 2.00     Years: 46.00     Pack years: 92.00     Types: Cigarettes     Quit date: 12/3/2020     Years since quittin.7   • Smokeless tobacco: Never Used   Substance Use Topics   • Alcohol use: Yes     Alcohol/week: 6.0 standard drinks     Types: 6 Cans of beer per week   • Drug use: No  "        Objective   Vitals:    09/01/21 1003   BP: 136/85   Pulse: 72   Temp: 96 °F (35.6 °C)   Weight: 125 kg (275 lb 12.8 oz)   Height: 185.4 cm (73\")     Body mass index is 36.39 kg/m².    Constitutional:       General: Not in acute distress.     Appearance: Healthy appearance. Well-developed and not in distress. Not diaphoretic.   Eyes:      Conjunctiva/sclera: Conjunctivae normal.      Pupils: Pupils are equal, round, and reactive to light.   HENT:      Head: Normocephalic and atraumatic.   Neck:      Vascular: No carotid bruit or JVD.   Pulmonary:      Effort: Pulmonary effort is normal. No respiratory distress.      Breath sounds: Normal breath sounds.   Cardiovascular:      Normal rate. Regular rhythm.   Skin:     General: Skin is cool.   Neurological:      Mental Status: Alert, oriented to person, place, and time and oriented to person, place and time.         Lab Results   Component Value Date     (L) 12/03/2020    K 4.8 12/03/2020    CL 93 (L) 12/03/2020    CO2 25.9 12/03/2020    BUN 21 (H) 12/03/2020    CREATININE 0.99 12/03/2020    GLUCOSE 517 (C) 12/03/2020    CALCIUM 9.5 12/03/2020    AST 35 12/03/2020    ALT 42 (H) 12/03/2020    ALKPHOS 124 (H) 12/03/2020     No results found for: CKTOTAL  Lab Results   Component Value Date    WBC 8.71 12/03/2020    HGB 16.0 12/03/2020    HCT 46.1 12/03/2020     12/03/2020     Lab Results   Component Value Date    INR 1.00 12/03/2020     Lab Results   Component Value Date    MG 2.0 12/04/2020     Lab Results   Component Value Date    TSH 3.590 12/04/2020    TRIG 742 (H) 12/04/2020    HDL 25 (L) 12/04/2020    LDL 21 12/04/2020      No results found for: BNP    During this visit the following were done:  Labs Reviewed []    Labs Ordered []    Radiology Reports Reviewed []    Radiology Ordered []    PCP Records Reviewed []    Referring Provider Records Reviewed []    ER Records Reviewed []    Hospital Records Reviewed []    History Obtained From Family []  "   Radiology Images Reviewed []    Other Reviewed []    Records Requested []       Procedures    Assessment/Plan    Diagnosis Plan   1. ASCVD (arteriosclerotic cardiovascular disease), apparently nonobstructive, clinically stable.     2. Typical atrial flutter (CMS/HCC)     3. Dyslipidemia  rosuvastatin (CRESTOR) 10 MG tablet   4. Essential hypertension  losartan (COZAAR) 50 MG tablet   5. PAD (peripheral artery disease) (CMS/HCC)     6. Paroxysmal atrial fibrillation (CMS/HCC)  apixaban (ELIQUIS) 5 MG tablet tablet            Recommendations:  1. ASCVD  1. Clinically asymptomatic.  I will continue Plavix, losartan, metoprolol, and Crestor.  2. I will be requesting blood work from PCP.  2. Atrial flutter  1. Appears to be maintaining sinus rhythm with assistance of flecainide.  I will continue this as well as Eliquis for anticoagulation.      No follow-ups on file.    As always, I appreciate very much the opportunity to participate in the cardiovascular care of your patients.      With Best Regards,    Michoacano Dockery PA-C

## 2021-09-08 ENCOUNTER — TELEPHONE (OUTPATIENT)
Dept: CARDIOLOGY | Facility: CLINIC | Age: 56
End: 2021-09-08

## 2021-09-08 NOTE — TELEPHONE ENCOUNTER
----- Message from Michoacano Dockery PA-C sent at 9/1/2021 10:45 AM EDT -----  Can we get recen tblood work from pcp/

## 2021-09-09 RX ORDER — ICOSAPENT ETHYL 1000 MG/1
2 CAPSULE ORAL 2 TIMES DAILY WITH MEALS
Qty: 120 CAPSULE | Refills: 6 | Status: SHIPPED | OUTPATIENT
Start: 2021-09-09

## 2021-10-08 ENCOUNTER — TRANSCRIBE ORDERS (OUTPATIENT)
Dept: ADMINISTRATIVE | Facility: HOSPITAL | Age: 56
End: 2021-10-08

## 2021-10-08 DIAGNOSIS — M54.50 LOW BACK PAIN, UNSPECIFIED BACK PAIN LATERALITY, UNSPECIFIED CHRONICITY, UNSPECIFIED WHETHER SCIATICA PRESENT: Primary | ICD-10-CM

## 2021-10-26 ENCOUNTER — HOSPITAL ENCOUNTER (OUTPATIENT)
Dept: MRI IMAGING | Facility: HOSPITAL | Age: 56
Discharge: HOME | End: 2021-10-26
Admitting: NURSE PRACTITIONER

## 2021-10-26 DIAGNOSIS — M54.50 LOW BACK PAIN, UNSPECIFIED BACK PAIN LATERALITY, UNSPECIFIED CHRONICITY, UNSPECIFIED WHETHER SCIATICA PRESENT: ICD-10-CM

## 2021-10-26 PROCEDURE — 72148 MRI LUMBAR SPINE W/O DYE: CPT

## 2021-10-26 PROCEDURE — 72148 MRI LUMBAR SPINE W/O DYE: CPT | Performed by: RADIOLOGY

## 2021-10-28 ENCOUNTER — TELEPHONE (OUTPATIENT)
Dept: CARDIOLOGY | Facility: CLINIC | Age: 56
End: 2021-10-28

## 2021-10-28 NOTE — TELEPHONE ENCOUNTER
Called Isabelle back and she stated that his PCP provider is wanting to speak Cassi bus she in a room with a patient and will have her call us back.     I gave Carlos an apt for 11/8 at 2:15 pm with Cassi. Pt has been advised of this.

## 2021-10-28 NOTE — TELEPHONE ENCOUNTER
Please call patient's PCP regarding high triglycerites [phone].  [facility] [phone] ask for Isabelle.

## 2021-10-28 NOTE — TELEPHONE ENCOUNTER
Called Isabelle back and she stated that pt is refusing to go to the ER and wanted to know if we could see him today.

## 2021-11-11 ENCOUNTER — OFFICE VISIT (OUTPATIENT)
Dept: CARDIOLOGY | Facility: CLINIC | Age: 56
End: 2021-11-11

## 2021-11-11 VITALS
WEIGHT: 260.6 LBS | BODY MASS INDEX: 34.54 KG/M2 | SYSTOLIC BLOOD PRESSURE: 140 MMHG | OXYGEN SATURATION: 98 % | DIASTOLIC BLOOD PRESSURE: 91 MMHG | TEMPERATURE: 96.8 F | HEIGHT: 73 IN | HEART RATE: 65 BPM | RESPIRATION RATE: 18 BRPM

## 2021-11-11 DIAGNOSIS — I48.0 PAROXYSMAL ATRIAL FIBRILLATION (HCC): ICD-10-CM

## 2021-11-11 DIAGNOSIS — E78.5 DYSLIPIDEMIA: ICD-10-CM

## 2021-11-11 DIAGNOSIS — Z87.891 HISTORY OF TOBACCO ABUSE: ICD-10-CM

## 2021-11-11 DIAGNOSIS — E78.1 HYPERTRIGLYCERIDEMIA: ICD-10-CM

## 2021-11-11 DIAGNOSIS — I25.10 ASCVD (ARTERIOSCLEROTIC CARDIOVASCULAR DISEASE): Primary | ICD-10-CM

## 2021-11-11 DIAGNOSIS — E11.9 TYPE 2 DIABETES MELLITUS WITHOUT COMPLICATION, WITHOUT LONG-TERM CURRENT USE OF INSULIN (HCC): ICD-10-CM

## 2021-11-11 DIAGNOSIS — I10 ESSENTIAL HYPERTENSION: ICD-10-CM

## 2021-11-11 PROCEDURE — 93000 ELECTROCARDIOGRAM COMPLETE: CPT | Performed by: NURSE PRACTITIONER

## 2021-11-11 PROCEDURE — 99214 OFFICE O/P EST MOD 30 MIN: CPT | Performed by: NURSE PRACTITIONER

## 2021-11-11 NOTE — PROGRESS NOTES
Brenda Gallagher, ENID  Carlos Carson  1965 11/11/2021    Patient Active Problem List   Diagnosis   • Essential hypertension   • Coronary artery disease without angina pectoris, non-hemodynamically significant   • PVD (peripheral vascular disease) ([facility])   • Dyslipidemia   • Tobacco abuse   • Simple chronic bronchitis (HCC)   • Obesity (BMI 30-39.9)   • Cough due to ACE inhibitor (Lisinopril)   • Chronic bronchitis (HCC)   • ASCVD (arteriosclerotic cardiovascular disease), apparently nonobstructive, clinically stable.   • Peripheral arterial disease, involving both lower extremities with bilateral leg claudication.   • Chronic fatigue   • PAD (peripheral artery disease) ([facility])   • Atrial flutter ([facility])       Dear Brenda Gallagher APRN:    Subjective     Chief Complaint   Patient presents with   • Follow-up     essential hypertension   • Follow-up     arteriosclerotic cardiovascular diease            History of Present Illness:    Carlos Carson is a 56 y.o. male with a past medical history of nonobstructive coronary artery disease, dyslipidemia, hypertension, severe peripheral arterial disease status post arteriogram and intervention bilateral lower extremities in 2018 and 2019, COPD, history of tobacco abuse, diabetes mellitus type 2, and atrial flutter maintaining sinus rhythm on flecainide.  He presents today for routine cardiology follow-up.  Reports he has been doing very well.  He has not smoked since December of last year.  He denies any chest pains, shortness of breath, palpitations, or claudication symptoms.  His blood pressure has been well controlled on average.  He recently was evaluated by PCP and their office notified us his triglycerides were 1700.  Lab reports are not available for my review.  He does take Crestor and Vascepa.  His blood sugar has been markedly elevated.  He states his PCP wanted to initiate insulin but he refused.  He is trying to modify his blood sugar with dietary changes.  He denies  any bleeding issues.          Allergies   Allergen Reactions   • Lisinopril Cough   :      Current Outpatient Medications:   •  apixaban (ELIQUIS) 5 MG tablet tablet, Take 1 tablet by mouth Every 12 (Twelve) Hours., Disp: 180 tablet, Rfl: 3  •  buPROPion SR (WELLBUTRIN SR) 150 MG 12 hr tablet, Take 150 mg by mouth 2 (Two) Times a Day., Disp: , Rfl:   •  clopidogrel (PLAVIX) 75 MG tablet, Take 1 tablet by mouth Daily., Disp: 90 tablet, Rfl: 3  •  flecainide (TAMBOCOR) 50 MG tablet, Take 1 tablet by mouth 2 (Two) Times a Day., Disp: 180 tablet, Rfl: 3  •  gabapentin (NEURONTIN) 800 MG tablet, Take 800 mg by mouth 3 (Three) Times a Day As Needed (nerve pain)., Disp: , Rfl: 0  •  glipizide (GLUCOTROL) 5 MG tablet, Take 5 mg by mouth 2 (Two) Times a Day Before Meals., Disp: , Rfl:   •  HYDROcodone-acetaminophen (NORCO) 7.5-325 MG per tablet, Take 1 tablet by mouth 2 (Two) Times a Day As Needed for Moderate Pain ., Disp: , Rfl: 0  •  icosapent ethyl (Vascepa) 1 g capsule capsule, Take 2 g by mouth 2 (Two) Times a Day With Meals., Disp: 120 capsule, Rfl: 6  •  levothyroxine (SYNTHROID, LEVOTHROID) 50 MCG tablet, Take 50 mcg by mouth Daily. New prescription, Disp: , Rfl:   •  losartan (COZAAR) 50 MG tablet, Take 1 tablet by mouth Daily., Disp: 90 tablet, Rfl: 3  •  metFORMIN (GLUCOPHAGE) 500 MG tablet, Take 500 mg by mouth 2 (Two) Times a Day With Meals., Disp: , Rfl:   •  metoprolol tartrate (LOPRESSOR) 50 MG tablet, Take 2.5 tablets by mouth Every 12 (Twelve) Hours for 30 days., Disp: 180 tablet, Rfl: 3  •  pantoprazole (Protonix) 40 MG EC tablet, Take 1 tablet by mouth Daily., Disp: 30 tablet, Rfl: 3  •  pantoprazole (Protonix) 40 MG EC tablet, Take 1 tablet by mouth Daily., Disp: 30 tablet, Rfl: 2  •  rosuvastatin (CRESTOR) 10 MG tablet, Take 1 tablet by mouth Daily., Disp: 90 tablet, Rfl: 3      The following portions of the patient's history were reviewed and updated as appropriate: allergies, current medications,  "past family history, past medical history, past social history, past surgical history and problem list.    Social History     Tobacco Use   • Smoking status: Former Smoker     Packs/day: 2.00     Years: 46.00     Pack years: 92.00     Types: Cigarettes     Quit date: 12/3/2020     Years since quittin.9   • Smokeless tobacco: Never Used   Vaping Use   • Vaping Use: Never used   Substance Use Topics   • Alcohol use: Yes     Alcohol/week: 6.0 standard drinks     Types: 6 Cans of beer per week   • Drug use: No       ROS    Objective   Vitals:    21 0818   BP: 140/91   BP Location: Right arm   Patient Position: Sitting   Cuff Size: Adult   Pulse: 65   Resp: 18   Temp: 96.8 °F (36 °C)   SpO2: 98%   Weight: 118 kg (260 lb 9.6 oz)   Height: 185.4 cm (72.99\")     Body mass index is 34.39 kg/m².        Vitals reviewed.   Constitutional:       Appearance: Healthy appearance. Well-developed and not in distress.   HENT:      Head: Normocephalic and atraumatic.   Pulmonary:      Effort: Pulmonary effort is normal.      Breath sounds: Normal breath sounds. No wheezing. No rales.   Cardiovascular:      Normal rate. Regular rhythm.      Murmurs: There is no murmur.      . No S3 and S4 gallop.   Edema:     Peripheral edema absent.   Abdominal:      General: Bowel sounds are normal.      Palpations: Abdomen is soft.   Skin:     General: Skin is warm and dry.   Neurological:      Mental Status: Alert, oriented to person, place, and time and oriented to person, place and time.   Psychiatric:         Mood and Affect: Mood normal.         Behavior: Behavior normal.         Lab Results   Component Value Date     (L) 2020    K 4.8 2020    CL 93 (L) 2020    CO2 25.9 2020    BUN 21 (H) 2020    CREATININE 0.99 2020    GLUCOSE 517 (C) 2020    CALCIUM 9.5 2020    AST 35 2020    ALT 42 (H) 2020    ALKPHOS 124 (H) 2020     No results found for: CKTOTAL  Lab Results "   Component Value Date    WBC 8.71 12/03/2020    HGB 16.0 12/03/2020    HCT 46.1 12/03/2020     12/03/2020     Lab Results   Component Value Date    INR 1.00 12/03/2020     Lab Results   Component Value Date    MG 2.0 12/04/2020     Lab Results   Component Value Date    TSH 3.590 12/04/2020    TRIG 742 (H) 12/04/2020    HDL 25 (L) 12/04/2020    LDL 21 12/04/2020      No results found for: BNP          ECG 12 Lead    Date/Time: 11/11/2021 8:45 AM  Performed by: Cassi Rushing APRN  Authorized by: Cassi Rushing APRN   Comparison: compared with previous ECG   Similar to previous ECG  Rhythm: sinus rhythm  BPM: 62  Other findings: early repolarization              Assessment/Plan    Diagnosis Plan   1. ASCVD (arteriosclerotic cardiovascular disease), apparently nonobstructive, clinically stable.  ECG 12 Lead   2. Paroxysmal atrial fibrillation (HCC)  ECG 12 Lead   3. Essential hypertension  ECG 12 Lead   4. Dyslipidemia  ECG 12 Lead   5. Type 2 diabetes mellitus without complication, without long-term current use of insulin (HCC)  ECG 12 Lead    Ambulatory Referral to Endocrinology   6. Hypertriglyceridemia  ECG 12 Lead    Ambulatory Referral to Endocrinology   7. History of tobacco abuse  ECG 12 Lead                Recommendations:    1. ASCVD-continue Plavix, losartan, metoprolol, and Crestor.  No recent anginal symptoms.  2. Essential hypertension-blood pressure mildly elevated in the office today but typically well controlled.  Continue to monitor with BP goal less than 130/80.  3. Paroxysmal atrial fibrillation-maintaining sinus rhythm on flecainide, anticoagulated with Eliquis.  QRS acceptable on EKG today.  4. Dyslipidemia-continue statin therapy.  We will request recent labs from PCP.  LDL goal less than 70.  5. The hypertriglyceridemia seems to be secondary to his uncontrolled diabetes as his triglycerides have been near normal in the past when A1c was well controlled.  We will continue with  Vascepa.  I have requested labs.  We will plan to increase Crestor if LFTs are acceptable.  We have had a lengthy discussion regarding diabetes and glucose control.  He has agreed to a referral to endocrinology for further evaluation and treatment.  6. Follow-up in 4 to 5 months or sooner if needed.        Return in about 4 months (around 3/11/2022) for Recheck.    As always, I appreciate very much the opportunity to participate in the cardiovascular care of your patients.      With Best Regards,    Cassi Rushing, APRN

## 2021-11-15 DIAGNOSIS — E78.5 DYSLIPIDEMIA: ICD-10-CM

## 2021-11-15 RX ORDER — ROSUVASTATIN CALCIUM 20 MG/1
20 TABLET, COATED ORAL DAILY
Qty: 90 TABLET | Refills: 3 | Status: SHIPPED | OUTPATIENT
Start: 2021-11-15

## 2021-11-23 RX ORDER — METOPROLOL TARTRATE 50 MG/1
TABLET, FILM COATED ORAL
Qty: 150 TABLET | Refills: 3 | Status: SHIPPED | OUTPATIENT
Start: 2021-11-23

## 2021-12-22 RX ORDER — CLOPIDOGREL BISULFATE 75 MG/1
TABLET ORAL
Qty: 30 TABLET | Refills: 11 | Status: SHIPPED | OUTPATIENT
Start: 2021-12-22

## 2022-02-02 DIAGNOSIS — E78.5 DYSLIPIDEMIA: ICD-10-CM

## 2022-02-02 RX ORDER — ROSUVASTATIN CALCIUM 10 MG/1
TABLET, COATED ORAL
Qty: 30 TABLET | Refills: 11 | OUTPATIENT
Start: 2022-02-02

## 2022-03-03 ENCOUNTER — OFFICE VISIT (OUTPATIENT)
Dept: CARDIOLOGY | Facility: CLINIC | Age: 57
End: 2022-03-03

## 2022-03-03 VITALS
BODY MASS INDEX: 32.74 KG/M2 | TEMPERATURE: 97.1 F | OXYGEN SATURATION: 97 % | SYSTOLIC BLOOD PRESSURE: 151 MMHG | HEART RATE: 60 BPM | HEIGHT: 73 IN | RESPIRATION RATE: 18 BRPM | WEIGHT: 247 LBS | DIASTOLIC BLOOD PRESSURE: 94 MMHG

## 2022-03-03 DIAGNOSIS — E11.9 TYPE 2 DIABETES MELLITUS WITHOUT COMPLICATION, WITHOUT LONG-TERM CURRENT USE OF INSULIN: ICD-10-CM

## 2022-03-03 DIAGNOSIS — I48.0 PAROXYSMAL ATRIAL FIBRILLATION: ICD-10-CM

## 2022-03-03 DIAGNOSIS — I10 ESSENTIAL HYPERTENSION: ICD-10-CM

## 2022-03-03 DIAGNOSIS — E78.5 DYSLIPIDEMIA: ICD-10-CM

## 2022-03-03 DIAGNOSIS — I25.10 ASCVD (ARTERIOSCLEROTIC CARDIOVASCULAR DISEASE): Primary | ICD-10-CM

## 2022-03-03 PROCEDURE — 99214 OFFICE O/P EST MOD 30 MIN: CPT | Performed by: NURSE PRACTITIONER

## 2022-03-03 PROCEDURE — 93000 ELECTROCARDIOGRAM COMPLETE: CPT | Performed by: NURSE PRACTITIONER

## 2022-03-03 RX ORDER — LOSARTAN POTASSIUM 100 MG/1
100 TABLET ORAL DAILY
Qty: 90 TABLET | Refills: 3 | Status: SHIPPED | OUTPATIENT
Start: 2022-03-03

## 2022-03-03 RX ORDER — HYDROXYZINE HYDROCHLORIDE 10 MG/1
20 TABLET, FILM COATED ORAL EVERY 6 HOURS PRN
COMMUNITY

## 2022-03-03 NOTE — PROGRESS NOTES
Brenda Gallagher, ENID  Carlos Carson  1965 03/03/2022    Patient Active Problem List   Diagnosis   • Essential hypertension   • Coronary artery disease without angina pectoris, non-hemodynamically significant   • PVD (peripheral vascular disease) ([facility])   • Dyslipidemia   • Tobacco abuse   • Simple chronic bronchitis (HCC)   • Obesity (BMI 30-39.9)   • Cough due to ACE inhibitor (Lisinopril)   • Chronic bronchitis ([facility])   • ASCVD (arteriosclerotic cardiovascular disease), apparently nonobstructive, clinically stable.   • Peripheral arterial disease, involving both lower extremities with bilateral leg claudication.   • Chronic fatigue   • PAD (peripheral artery disease) ([facility])   • Atrial flutter ([facility])       Dear Brenda Gallagher APRN:    Subjective     Chief Complaint   Patient presents with   • ASCVD     4 month fu   • Hypertension           History of Present Illness:    Carlos Carson is a 56 y.o. male with a history of nonobstructive coronary artery disease, dyslipidemia, hypertension, severe peripheral arterial disease status post arteriogram and intervention bilateral lower extremities in 2018 and 2019, COPD, history of tobacco abuse, diabetes mellitus type 2, and atrial flutter maintaining sinus rhythm on flecainide.  He presents today for routine cardiology follow-up.  He states other than chronic back pains, he is feeling great.  He denies any chest pains, shortness of breath, palpitations.  However, he does report his BP has been running high.  His BP was in the 190s over 100 yesterday at his PCP office.  He states his pee fluctuates like this often.          Allergies   Allergen Reactions   • Lisinopril Cough   :      Current Outpatient Medications:   •  apixaban (ELIQUIS) 5 MG tablet tablet, Take 1 tablet by mouth Every 12 (Twelve) Hours., Disp: 180 tablet, Rfl: 3  •  buPROPion SR (WELLBUTRIN SR) 150 MG 12 hr tablet, Take 150 mg by mouth 2 (Two) Times a Day., Disp: , Rfl:   •  clopidogrel (PLAVIX) 75 MG  tablet, TAKE 1 TABLET BY MOUTH EVERY DAY, Disp: 30 tablet, Rfl: 11  •  flecainide (TAMBOCOR) 50 MG tablet, Take 1 tablet by mouth 2 (Two) Times a Day., Disp: 180 tablet, Rfl: 3  •  gabapentin (NEURONTIN) 800 MG tablet, Take 800 mg by mouth 3 (Three) Times a Day As Needed (nerve pain)., Disp: , Rfl: 0  •  glipizide (GLUCOTROL) 5 MG tablet, Take 5 mg by mouth 2 (Two) Times a Day Before Meals., Disp: , Rfl:   •  HYDROcodone-acetaminophen (NORCO) 7.5-325 MG per tablet, Take 1 tablet by mouth 2 (Two) Times a Day As Needed for Moderate Pain ., Disp: , Rfl: 0  •  hydrOXYzine (ATARAX) 10 MG tablet, Take 20 mg by mouth Every 6 (Six) Hours As Needed for Itching., Disp: , Rfl:   •  icosapent ethyl (Vascepa) 1 g capsule capsule, Take 2 g by mouth 2 (Two) Times a Day With Meals., Disp: 120 capsule, Rfl: 6  •  levothyroxine (SYNTHROID, LEVOTHROID) 50 MCG tablet, Take 50 mcg by mouth Daily. New prescription, Disp: , Rfl:   •  losartan (COZAAR) 100 MG tablet, Take 1 tablet by mouth Daily., Disp: 90 tablet, Rfl: 3  •  metFORMIN (GLUCOPHAGE) 500 MG tablet, Take 500 mg by mouth 2 (Two) Times a Day With Meals., Disp: , Rfl:   •  metoprolol tartrate (LOPRESSOR) 50 MG tablet, Take 2.5 TABLETS BY MOUTH EVERY TWELVE HOURS, Disp: 150 tablet, Rfl: 3  •  pantoprazole (Protonix) 40 MG EC tablet, Take 1 tablet by mouth Daily., Disp: 30 tablet, Rfl: 3  •  pantoprazole (Protonix) 40 MG EC tablet, Take 1 tablet by mouth Daily., Disp: 30 tablet, Rfl: 2  •  rosuvastatin (CRESTOR) 20 MG tablet, Take 1 tablet by mouth Daily., Disp: 90 tablet, Rfl: 3      The following portions of the patient's history were reviewed and updated as appropriate: allergies, current medications, past family history, past medical history, past social history, past surgical history and problem list.    Social History     Tobacco Use   • Smoking status: Former Smoker     Packs/day: 2.00     Years: 46.00     Pack years: 92.00     Types: Cigarettes     Quit date: 12/3/2020      "Years since quittin.2   • Smokeless tobacco: Never Used   Vaping Use   • Vaping Use: Never used   Substance Use Topics   • Alcohol use: Yes     Alcohol/week: 6.0 standard drinks     Types: 6 Cans of beer per week   • Drug use: No       ROS    Objective   Vitals:    22 0831   BP: 151/94   BP Location: Right arm   Patient Position: Sitting   Pulse: 60   Resp: 18   Temp: 97.1 °F (36.2 °C)   SpO2: 97%   Weight: 112 kg (247 lb)   Height: 185.4 cm (73\")     Body mass index is 32.59 kg/m².        Vitals reviewed.   Constitutional:       Appearance: Healthy appearance. Well-developed and not in distress.   HENT:      Head: Normocephalic and atraumatic.   Pulmonary:      Effort: Pulmonary effort is normal.      Breath sounds: Normal breath sounds. No wheezing. No rales.   Cardiovascular:      Normal rate. Regular rhythm.      Murmurs: There is no murmur.      . No S3 and S4 gallop.   Edema:     Peripheral edema absent.   Abdominal:      General: Bowel sounds are normal.      Palpations: Abdomen is soft.   Skin:     General: Skin is warm and dry.   Neurological:      Mental Status: Alert, oriented to person, place, and time and oriented to person, place and time.   Psychiatric:         Mood and Affect: Mood normal.         Behavior: Behavior normal.         Lab Results   Component Value Date     (L) 2020    K 4.8 2020    CL 93 (L) 2020    CO2 25.9 2020    BUN 21 (H) 2020    CREATININE 0.99 2020    GLUCOSE 517 (C) 2020    CALCIUM 9.5 2020    AST 35 2020    ALT 42 (H) 2020    ALKPHOS 124 (H) 2020     No results found for: CKTOTAL  Lab Results   Component Value Date    WBC 8.71 2020    HGB 16.0 2020    HCT 46.1 2020     2020     Lab Results   Component Value Date    INR 1.00 2020     Lab Results   Component Value Date    MG 2.0 2020     Lab Results   Component Value Date    TSH 3.590 2020    TRIG 742 " (H) 12/04/2020    HDL 25 (L) 12/04/2020    LDL 21 12/04/2020      No results found for: BNP          ECG 12 Lead    Date/Time: 3/3/2022 8:34 AM  Performed by: Cassi Rushing APRN  Authorized by: aCssi Rushing APRN   Comparison: compared with previous ECG from 11/11/2021  Similar to previous ECG  Rhythm: sinus rhythm  BPM: 60  Comments: QRS 96 ms  QTc 422 ms              Assessment/Plan    Diagnosis Plan   1. ASCVD (arteriosclerotic cardiovascular disease), apparently nonobstructive, clinically stable.  ECG 12 Lead   2. Paroxysmal atrial fibrillation (HCC)  ECG 12 Lead   3. Essential hypertension  ECG 12 Lead    losartan (COZAAR) 100 MG tablet    Basic Metabolic Panel   4. Type 2 diabetes mellitus without complication, without long-term current use of insulin (HCC)  ECG 12 Lead   5. Dyslipidemia                  Recommendations:    1. ASCVD - no recent anginal symptoms. Continue Plavix, losartan, metoprolol, and rosuvastatin.  2. Paroxysmal atrial fibrillation - maintaining sinus rhythm on flecainide and metoprolol.  3. Essential hypertension - will increase metoprolol to 100 mg daily. BMP in one week. If BP is not well controlled, will plan to add amlodipine.  4. Diabetes mellitus type 2 - following with PCP. Reports his blood glucose markedly improved on recent labs.  5. Dyslipidemia - statin increased after last visit. Will obtain most recent labs from PCP.  6. Follow up in 6 weeks or sooner if needed.           Return in about 6 weeks (around 4/14/2022) for Recheck.    As always, I appreciate very much the opportunity to participate in the cardiovascular care of your patients.      With Best Regards,    ENID Davenport

## 2022-03-04 ENCOUNTER — TELEPHONE (OUTPATIENT)
Dept: CARDIOLOGY | Facility: CLINIC | Age: 57
End: 2022-03-04

## 2022-03-04 NOTE — TELEPHONE ENCOUNTER
Requested.       ----- Message from ENID Davenport sent at 3/3/2022 10:07 AM EST -----  Please get most recent labs from PCP. Thanks.

## 2022-06-22 DIAGNOSIS — I48.0 PAROXYSMAL ATRIAL FIBRILLATION: ICD-10-CM

## 2022-06-23 RX ORDER — FLECAINIDE ACETATE 50 MG/1
TABLET ORAL
Qty: 180 TABLET | Refills: 0 | Status: SHIPPED | OUTPATIENT
Start: 2022-06-23

## 2022-06-23 RX ORDER — APIXABAN 5 MG/1
TABLET, FILM COATED ORAL
Qty: 180 TABLET | Refills: 0 | Status: SHIPPED | OUTPATIENT
Start: 2022-06-23

## 2023-01-06 RX ORDER — CLOPIDOGREL BISULFATE 75 MG/1
TABLET ORAL
Qty: 30 TABLET | Refills: 11 | OUTPATIENT
Start: 2023-01-06

## 2024-04-03 NOTE — ADDENDUM NOTE
Addended by: YE KRAUSE on: 9/26/2017 10:40 AM     Modules accepted: Orders     Detail Level: Detailed

## (undated) DEVICE — DESTINATION PERIPHERAL GUIDING SHEATH: Brand: DESTINATION

## (undated) DEVICE — ST EXT IV SMARTSITE 2VLV SP M LL 5ML IV1

## (undated) DEVICE — Device

## (undated) DEVICE — ADULT DISPOSABLE SINGLE-PATIENT USE PULSE OXIMETER SENSOR: Brand: NONIN

## (undated) DEVICE — ST ACC MICROPUNCTURE .018 ECHO/TRANSLSS/SS/TP 4F/10CM 21G

## (undated) DEVICE — SHEATH INTRO SUPERSHEATH SHRT .035 4F 11CM

## (undated) DEVICE — MYNXGRIP 6F/7F: Brand: MYNXGRIP

## (undated) DEVICE — ST INF PRI SMRTSTE 20DRP 2VLV 24ML 117

## (undated) DEVICE — Device: Brand: MEDEX

## (undated) DEVICE — CATH ATHRCTMY HAWK1 6F

## (undated) DEVICE — GW INQWIRE FC PTFE J/3MM .035 180

## (undated) DEVICE — ATHERECTOMY H1-LS HAWKONE 7F STD TIP US: Brand: HAWKONE™

## (undated) DEVICE — SGW .014 STABILIZER 300CM: Brand: STABILIZER

## (undated) DEVICE — GUIDEWIRE WITH ICE™ HYDROPHILIC COATING: Brand: CHOICE™ PT

## (undated) DEVICE — DEV INFL MONARCH 25W

## (undated) DEVICE — PK CATH CARD 70

## (undated) DEVICE — RADIFOCUS GLIDEWIRE: Brand: GLIDEWIRE

## (undated) DEVICE — CANNULA,OXY,ADULT,SUPER SOFT,W/14'TUB,UC: Brand: MEDLINE INDUSTRIES, INC.

## (undated) DEVICE — LN INJ CONTRST FLXCIL HP F/M LL 1200PSI10

## (undated) DEVICE — SHEATH INTRO SUPERSHEATH JWIRE .035 5F 11CM

## (undated) DEVICE — CATH SUP PROC TRAILBLAZER .035IN 150CM

## (undated) DEVICE — HI-TORQUE COMMAND GUIDE WIRE .014" 300 CM: Brand: HI-TORQUE COMMAND

## (undated) DEVICE — CATH SFT VU RIM BR 5F65CM 0.35

## (undated) DEVICE — CATH ANGIO SOFT/VU SELECTIVE RIM .035IN 4F 65CM

## (undated) DEVICE — SHEATH INTRO SUPERSHEATH JWIRE .035 6F 11CM

## (undated) DEVICE — GUIDEWIRE BOWL W/LID: Brand: MEDLINE INDUSTRIES, INC.

## (undated) DEVICE — KT MINI ACC 5F .18X40CM SS 21G 7CM

## (undated) DEVICE — DRSNG SURESITE WNDW 4X4.5

## (undated) DEVICE — SHEATH INTRO SUPERSHEATH JWIRE .035 7F 11CM